# Patient Record
Sex: MALE | Race: WHITE | NOT HISPANIC OR LATINO | Employment: FULL TIME | URBAN - METROPOLITAN AREA
[De-identification: names, ages, dates, MRNs, and addresses within clinical notes are randomized per-mention and may not be internally consistent; named-entity substitution may affect disease eponyms.]

---

## 2017-03-09 ENCOUNTER — HOSPITAL ENCOUNTER (OUTPATIENT)
Dept: FAMILY MEDICINE CLINIC | Facility: CLINIC | Age: 32
Setting detail: SPECIMEN
Discharge: HOME | End: 2017-03-09
Attending: FAMILY MEDICINE | Admitting: FAMILY MEDICINE

## 2017-03-09 LAB
ALBUMIN SERPL-MCNC: 3.7 G/DL (ref 3.5–4.8)
ALBUMIN/GLOB SERPL: 1.1 {RATIO} (ref 1–1.7)
ALP SERPL-CCNC: 129 IU/L (ref 32–91)
ALT SERPL-CCNC: 29 IU/L (ref 17–63)
ANION GAP SERPL CALC-SCNC: 16.8 MMOL/L (ref 10–20)
AST SERPL-CCNC: 77 IU/L (ref 15–41)
BACTERIA SPEC AEROBE CULT: NORMAL
BASOPHILS # BLD AUTO: 0 10*3/UL (ref 0–0.2)
BASOPHILS NFR BLD AUTO: 1 % (ref 0–2)
BILIRUB SERPL-MCNC: 1.7 MG/DL (ref 0.3–1.2)
BUN SERPL-MCNC: 2 MG/DL (ref 8–20)
BUN/CREAT SERPL: 2.9 (ref 6.2–20.3)
CALCIUM SERPL-MCNC: 9 MG/DL (ref 8.9–10.3)
CHLORIDE SERPL-SCNC: 100 MMOL/L (ref 101–111)
CONV CO2: 27 MMOL/L (ref 22–32)
CONV TOTAL PROTEIN: 7.2 G/DL (ref 6.1–7.9)
CREAT UR-MCNC: 0.7 MG/DL (ref 0.7–1.2)
DIFFERENTIAL METHOD BLD: (no result)
EOSINOPHIL # BLD AUTO: 0.3 10*3/UL (ref 0–0.3)
EOSINOPHIL # BLD AUTO: 4 % (ref 0–3)
ERYTHROCYTE [DISTWIDTH] IN BLOOD BY AUTOMATED COUNT: 15.4 % (ref 11.5–14.5)
GLOBULIN UR ELPH-MCNC: 3.5 G/DL (ref 2.5–3.8)
GLUCOSE SERPL-MCNC: 94 MG/DL (ref 65–99)
HCT VFR BLD AUTO: 49.6 % (ref 40–54)
HGB BLD-MCNC: 16.7 G/DL (ref 14–18)
LYMPHOCYTES # BLD AUTO: 1.6 10*3/UL (ref 0.8–4.8)
LYMPHOCYTES NFR BLD AUTO: 24 % (ref 18–42)
Lab: NORMAL
MCH RBC QN AUTO: 35.6 PG (ref 26–32)
MCHC RBC AUTO-ENTMCNC: 33.7 G/DL (ref 32–36)
MCV RBC AUTO: 105.6 FL (ref 80–94)
MICRO REPORT STATUS: NORMAL
MONOCYTES # BLD AUTO: 0.8 10*3/UL (ref 0.1–1.3)
MONOCYTES NFR BLD AUTO: 11 % (ref 2–11)
NEUTROPHILS # BLD AUTO: 4.3 10*3/UL (ref 2.3–8.6)
NEUTROPHILS NFR BLD AUTO: 60 % (ref 50–75)
NRBC BLD AUTO-RTO: 0 /100{WBCS}
NRBC/RBC NFR BLD MANUAL: 0 10*3/UL
PLATELET # BLD AUTO: 187 10*3/UL (ref 150–450)
PMV BLD AUTO: 10 FL (ref 7.4–10.4)
POTASSIUM SERPL-SCNC: 3.8 MMOL/L (ref 3.6–5.1)
RBC # BLD AUTO: 4.7 10*6/UL (ref 4.6–6)
SODIUM SERPL-SCNC: 140 MMOL/L (ref 136–144)
SPECIMEN SOURCE: NORMAL
WBC # BLD AUTO: 7 10*3/UL (ref 4.5–11.5)

## 2019-02-10 ENCOUNTER — INPATIENT HOSPITAL (AMBULATORY)
Dept: URBAN - METROPOLITAN AREA HOSPITAL 84 | Facility: HOSPITAL | Age: 34
End: 2019-02-10

## 2019-02-10 DIAGNOSIS — R94.5 ABNORMAL RESULTS OF LIVER FUNCTION STUDIES: ICD-10-CM

## 2019-02-10 DIAGNOSIS — K70.31 ALCOHOLIC CIRRHOSIS OF LIVER WITH ASCITES: ICD-10-CM

## 2019-02-10 DIAGNOSIS — R10.11 RIGHT UPPER QUADRANT PAIN: ICD-10-CM

## 2019-02-10 DIAGNOSIS — I85.10 SECONDARY ESOPHAGEAL VARICES WITHOUT BLEEDING: ICD-10-CM

## 2019-02-10 DIAGNOSIS — R16.1 SPLENOMEGALY, NOT ELSEWHERE CLASSIFIED: ICD-10-CM

## 2019-02-10 DIAGNOSIS — R19.7 DIARRHEA, UNSPECIFIED: ICD-10-CM

## 2019-02-10 DIAGNOSIS — K70.10 ALCOHOLIC HEPATITIS WITHOUT ASCITES: ICD-10-CM

## 2019-02-10 PROCEDURE — 99251: CPT | Performed by: NURSE PRACTITIONER

## 2019-02-11 ENCOUNTER — INPATIENT HOSPITAL (AMBULATORY)
Dept: URBAN - METROPOLITAN AREA HOSPITAL 84 | Facility: HOSPITAL | Age: 34
End: 2019-02-11

## 2019-02-11 DIAGNOSIS — E87.1 HYPO-OSMOLALITY AND HYPONATREMIA: ICD-10-CM

## 2019-02-11 DIAGNOSIS — R19.7 DIARRHEA, UNSPECIFIED: ICD-10-CM

## 2019-02-11 DIAGNOSIS — E87.6 HYPOKALEMIA: ICD-10-CM

## 2019-02-11 DIAGNOSIS — D69.6 THROMBOCYTOPENIA, UNSPECIFIED: ICD-10-CM

## 2019-02-11 DIAGNOSIS — K74.60 UNSPECIFIED CIRRHOSIS OF LIVER: ICD-10-CM

## 2019-02-11 DIAGNOSIS — N39.0 URINARY TRACT INFECTION, SITE NOT SPECIFIED: ICD-10-CM

## 2019-02-11 DIAGNOSIS — K70.11 ALCOHOLIC HEPATITIS WITH ASCITES: ICD-10-CM

## 2019-02-11 DIAGNOSIS — R93.3 ABNORMAL FINDINGS ON DIAGNOSTIC IMAGING OF OTHER PARTS OF DI: ICD-10-CM

## 2019-02-11 DIAGNOSIS — Z72.0 TOBACCO USE: ICD-10-CM

## 2019-02-11 PROCEDURE — 99232 SBSQ HOSP IP/OBS MODERATE 35: CPT | Performed by: INTERNAL MEDICINE

## 2019-02-12 ENCOUNTER — INPATIENT HOSPITAL (AMBULATORY)
Dept: URBAN - METROPOLITAN AREA HOSPITAL 84 | Facility: HOSPITAL | Age: 34
End: 2019-02-12

## 2019-02-12 DIAGNOSIS — R19.7 DIARRHEA, UNSPECIFIED: ICD-10-CM

## 2019-02-12 DIAGNOSIS — Z72.0 TOBACCO USE: ICD-10-CM

## 2019-02-12 DIAGNOSIS — K70.11 ALCOHOLIC HEPATITIS WITH ASCITES: ICD-10-CM

## 2019-02-12 DIAGNOSIS — E87.6 HYPOKALEMIA: ICD-10-CM

## 2019-02-12 DIAGNOSIS — D69.6 THROMBOCYTOPENIA, UNSPECIFIED: ICD-10-CM

## 2019-02-12 DIAGNOSIS — K74.60 UNSPECIFIED CIRRHOSIS OF LIVER: ICD-10-CM

## 2019-02-12 DIAGNOSIS — E87.1 HYPO-OSMOLALITY AND HYPONATREMIA: ICD-10-CM

## 2019-02-12 DIAGNOSIS — R93.3 ABNORMAL FINDINGS ON DIAGNOSTIC IMAGING OF OTHER PARTS OF DI: ICD-10-CM

## 2019-02-12 DIAGNOSIS — N39.0 URINARY TRACT INFECTION, SITE NOT SPECIFIED: ICD-10-CM

## 2019-02-12 DIAGNOSIS — R16.1 SPLENOMEGALY, NOT ELSEWHERE CLASSIFIED: ICD-10-CM

## 2019-02-12 PROCEDURE — 99232 SBSQ HOSP IP/OBS MODERATE 35: CPT | Performed by: INTERNAL MEDICINE

## 2019-02-13 ENCOUNTER — INPATIENT HOSPITAL (AMBULATORY)
Dept: URBAN - METROPOLITAN AREA HOSPITAL 84 | Facility: HOSPITAL | Age: 34
End: 2019-02-13

## 2019-02-13 DIAGNOSIS — R19.7 DIARRHEA, UNSPECIFIED: ICD-10-CM

## 2019-02-13 DIAGNOSIS — K74.60 UNSPECIFIED CIRRHOSIS OF LIVER: ICD-10-CM

## 2019-02-13 DIAGNOSIS — D69.6 THROMBOCYTOPENIA, UNSPECIFIED: ICD-10-CM

## 2019-02-13 DIAGNOSIS — R16.1 SPLENOMEGALY, NOT ELSEWHERE CLASSIFIED: ICD-10-CM

## 2019-02-13 DIAGNOSIS — E87.6 HYPOKALEMIA: ICD-10-CM

## 2019-02-13 DIAGNOSIS — K70.11 ALCOHOLIC HEPATITIS WITH ASCITES: ICD-10-CM

## 2019-02-13 DIAGNOSIS — E87.1 HYPO-OSMOLALITY AND HYPONATREMIA: ICD-10-CM

## 2019-02-13 DIAGNOSIS — Z72.0 TOBACCO USE: ICD-10-CM

## 2019-02-13 DIAGNOSIS — R93.3 ABNORMAL FINDINGS ON DIAGNOSTIC IMAGING OF OTHER PARTS OF DI: ICD-10-CM

## 2019-02-13 DIAGNOSIS — N39.0 URINARY TRACT INFECTION, SITE NOT SPECIFIED: ICD-10-CM

## 2019-02-13 PROCEDURE — 99232 SBSQ HOSP IP/OBS MODERATE 35: CPT | Performed by: INTERNAL MEDICINE

## 2019-02-20 ENCOUNTER — HOSPITAL ENCOUNTER (OUTPATIENT)
Dept: FAMILY MEDICINE CLINIC | Facility: CLINIC | Age: 34
Setting detail: SPECIMEN
Discharge: HOME | End: 2019-02-20
Attending: FAMILY MEDICINE | Admitting: FAMILY MEDICINE

## 2019-02-20 LAB
ALBUMIN SERPL-MCNC: 2.6 G/DL (ref 3.5–4.8)
ALBUMIN/GLOB SERPL: 0.6 {RATIO} (ref 1–1.7)
ALP SERPL-CCNC: 149 IU/L (ref 32–91)
ALT SERPL-CCNC: 100 IU/L (ref 17–63)
ANION GAP SERPL CALC-SCNC: 14.6 MMOL/L (ref 10–20)
AST SERPL-CCNC: 151 IU/L (ref 15–41)
BASOPHILS # BLD AUTO: 0 10*3/UL (ref 0–0.2)
BASOPHILS NFR BLD AUTO: 0 % (ref 0–2)
BILIRUB SERPL-MCNC: 13.1 MG/DL (ref 0.3–1.2)
BUN SERPL-MCNC: 12 MG/DL (ref 8–20)
BUN/CREAT SERPL: 17.1 (ref 6.2–20.3)
CALCIUM SERPL-MCNC: 8.8 MG/DL (ref 8.9–10.3)
CHLORIDE SERPL-SCNC: 94 MMOL/L (ref 101–111)
CONV CO2: 25 MMOL/L (ref 22–32)
CONV TOTAL PROTEIN: 7.3 G/DL (ref 6.1–7.9)
CREAT UR-MCNC: 0.7 MG/DL (ref 0.7–1.2)
DIFFERENTIAL METHOD BLD: (no result)
EOSINOPHIL # BLD AUTO: 0.1 10*3/UL (ref 0–0.3)
EOSINOPHIL # BLD AUTO: 1 % (ref 0–3)
ERYTHROCYTE [DISTWIDTH] IN BLOOD BY AUTOMATED COUNT: 21 % (ref 11.5–14.5)
GLOBULIN UR ELPH-MCNC: 4.7 G/DL (ref 2.5–3.8)
GLUCOSE SERPL-MCNC: 118 MG/DL (ref 65–99)
HCT VFR BLD AUTO: 42.4 % (ref 40–54)
HGB BLD-MCNC: 14.5 G/DL (ref 14–18)
LYMPHOCYTES # BLD AUTO: 1 10*3/UL (ref 0.8–4.8)
LYMPHOCYTES NFR BLD AUTO: 6 % (ref 18–42)
MCH RBC QN AUTO: 33.2 PG (ref 26–32)
MCHC RBC AUTO-ENTMCNC: 34.3 G/DL (ref 32–36)
MCV RBC AUTO: 97 FL (ref 80–94)
MONOCYTES # BLD AUTO: 2.8 10*3/UL (ref 0.1–1.3)
MONOCYTES NFR BLD AUTO: 17 % (ref 2–11)
NEUTROPHILS # BLD AUTO: 12 10*3/UL (ref 2.3–8.6)
NEUTROPHILS NFR BLD AUTO: 76 % (ref 50–75)
NRBC BLD AUTO-RTO: 0 /100{WBCS}
NRBC/RBC NFR BLD MANUAL: 0 10*3/UL
PLATELET # BLD AUTO: 122 10*3/UL (ref 150–450)
PMV BLD AUTO: 9.8 FL (ref 7.4–10.4)
POTASSIUM SERPL-SCNC: 3.6 MMOL/L (ref 3.6–5.1)
RBC # BLD AUTO: 4.37 10*6/UL (ref 4.6–6)
SODIUM SERPL-SCNC: 130 MMOL/L (ref 136–144)
WBC # BLD AUTO: 15.9 10*3/UL (ref 4.5–11.5)

## 2019-02-21 ENCOUNTER — OFFICE (AMBULATORY)
Dept: URBAN - METROPOLITAN AREA CLINIC 64 | Facility: CLINIC | Age: 34
End: 2019-02-21

## 2019-02-21 VITALS
HEIGHT: 69 IN | WEIGHT: 215 LBS | HEART RATE: 79 BPM | DIASTOLIC BLOOD PRESSURE: 75 MMHG | SYSTOLIC BLOOD PRESSURE: 130 MMHG

## 2019-02-21 DIAGNOSIS — K70.10 ALCOHOLIC HEPATITIS WITHOUT ASCITES: ICD-10-CM

## 2019-02-21 DIAGNOSIS — K74.69 OTHER CIRRHOSIS OF LIVER: ICD-10-CM

## 2019-02-21 DIAGNOSIS — R93.3 ABNORMAL FINDINGS ON DIAGNOSTIC IMAGING OF OTHER PARTS OF DI: ICD-10-CM

## 2019-02-21 PROCEDURE — 99213 OFFICE O/P EST LOW 20 MIN: CPT | Performed by: NURSE PRACTITIONER

## 2019-02-21 RX ORDER — PREDNISONE 10 MG/1
TABLET ORAL
Qty: 100 | Refills: 0 | Status: COMPLETED
Start: 2019-02-21 | End: 2019-04-04

## 2019-02-21 RX ORDER — RIFAXIMIN 200 MG/1
TABLET ORAL
Qty: 60 | Refills: 0 | Status: COMPLETED
End: 2019-02-21

## 2019-02-21 RX ORDER — SUCRALFATE 1 G/1
TABLET ORAL
Qty: 30 | Refills: 3 | Status: COMPLETED
End: 2019-05-13

## 2019-03-27 ENCOUNTER — HOSPITAL ENCOUNTER (OUTPATIENT)
Dept: INFUSION THERAPY | Facility: HOSPITAL | Age: 34
Discharge: HOME | End: 2019-03-27
Attending: NURSE PRACTITIONER | Admitting: NURSE PRACTITIONER

## 2019-04-04 ENCOUNTER — OFFICE (AMBULATORY)
Dept: URBAN - METROPOLITAN AREA CLINIC 64 | Facility: CLINIC | Age: 34
End: 2019-04-04

## 2019-04-04 VITALS
DIASTOLIC BLOOD PRESSURE: 65 MMHG | WEIGHT: 205 LBS | HEART RATE: 120 BPM | SYSTOLIC BLOOD PRESSURE: 101 MMHG | HEIGHT: 69 IN

## 2019-04-04 DIAGNOSIS — K74.69 OTHER CIRRHOSIS OF LIVER: ICD-10-CM

## 2019-04-04 DIAGNOSIS — R18.8 OTHER ASCITES: ICD-10-CM

## 2019-04-04 DIAGNOSIS — K70.10 ALCOHOLIC HEPATITIS WITHOUT ASCITES: ICD-10-CM

## 2019-04-04 PROCEDURE — 99214 OFFICE O/P EST MOD 30 MIN: CPT | Performed by: NURSE PRACTITIONER

## 2019-04-04 RX ORDER — SPIRONOLACTONE 50 MG/1
50 TABLET, FILM COATED ORAL
Qty: 30 | Refills: 5 | Status: ACTIVE
Start: 2019-04-04

## 2019-04-04 RX ORDER — OMEPRAZOLE 40 MG/1
40 CAPSULE, DELAYED RELEASE ORAL
Qty: 30 | Refills: 11 | Status: ACTIVE
Start: 2019-04-04

## 2019-04-04 RX ORDER — FUROSEMIDE 20 MG/1
TABLET ORAL
Qty: 30 | Refills: 3 | Status: ACTIVE

## 2019-04-09 ENCOUNTER — ON CAMPUS - OUTPATIENT (AMBULATORY)
Dept: URBAN - METROPOLITAN AREA HOSPITAL 2 | Facility: HOSPITAL | Age: 34
End: 2019-04-09

## 2019-04-09 VITALS
HEART RATE: 112 BPM | WEIGHT: 203 LBS | OXYGEN SATURATION: 99 % | HEIGHT: 69 IN | DIASTOLIC BLOOD PRESSURE: 78 MMHG | RESPIRATION RATE: 16 BRPM | DIASTOLIC BLOOD PRESSURE: 82 MMHG | HEART RATE: 109 BPM | OXYGEN SATURATION: 97 % | SYSTOLIC BLOOD PRESSURE: 124 MMHG | DIASTOLIC BLOOD PRESSURE: 76 MMHG | TEMPERATURE: 98.8 F | HEART RATE: 111 BPM | DIASTOLIC BLOOD PRESSURE: 86 MMHG | SYSTOLIC BLOOD PRESSURE: 134 MMHG | HEART RATE: 123 BPM | HEART RATE: 118 BPM | DIASTOLIC BLOOD PRESSURE: 110 MMHG | SYSTOLIC BLOOD PRESSURE: 110 MMHG | OXYGEN SATURATION: 98 % | SYSTOLIC BLOOD PRESSURE: 128 MMHG | SYSTOLIC BLOOD PRESSURE: 130 MMHG

## 2019-04-09 DIAGNOSIS — K70.30 ALCOHOLIC CIRRHOSIS OF LIVER WITHOUT ASCITES: ICD-10-CM

## 2019-04-09 DIAGNOSIS — I85.00 ESOPHAGEAL VARICES WITHOUT BLEEDING: ICD-10-CM

## 2019-04-09 DIAGNOSIS — T18.128A FOOD IN ESOPHAGUS CAUSING OTHER INJURY, INITIAL ENCOUNTER: ICD-10-CM

## 2019-04-09 PROCEDURE — 43235 EGD DIAGNOSTIC BRUSH WASH: CPT | Performed by: INTERNAL MEDICINE

## 2019-04-09 RX ORDER — NADOLOL 40 MG/1
40 TABLET ORAL
Qty: 30 | Refills: 12 | Status: ACTIVE
Start: 2019-04-09

## 2019-04-09 NOTE — SERVICENOTES
He does have grade I-II 2 cards of esophageal varices, his INR is 2.1, bands were not applied to avoid post-banding ulcer bleeding

## 2019-04-09 NOTE — SERVICEHPINOTES
CHLOE ESTRADA  is a  34  male   who presents today for a  EGD   for   the indications listed below. The updated Patient Profile was reviewed prior to the procedure, in conjunction with the Physical Exam, including medical conditions, surgical procedures, medications, allergies, family history and social history. See Physical Exam time stamp below for date and time of HPI completion.Pre-operatively, I reviewed the indication(s) for the procedure, the risks of the procedure [including but not limited to: unexpected bleeding possibly requiring hospitalization and/or unplanned repeat procedures, perforation possibly requiring surgical treatment, missed lesions and complications of sedation/MAC (also explained by anesthesia staff)]. I have evaluated the patient for risks associated with the planned anesthesia and the procedure to be performed and find the patient an acceptable candidate for IV sedation.Multiple opportunities were provided for any questions or concerns, and all questions were answered satisfactorily before any anesthesia was administered. We will proceed with the planned procedure.BR

## 2019-04-17 ENCOUNTER — HOSPITAL ENCOUNTER (OUTPATIENT)
Dept: INFUSION THERAPY | Facility: HOSPITAL | Age: 34
Discharge: ANOTHER HOSPITAL | End: 2019-04-17
Attending: NURSE PRACTITIONER | Admitting: NURSE PRACTITIONER

## 2019-04-17 LAB
ALBUMIN FLD-MCNC: <1 G/DL
ALBUMIN SERPL-MCNC: 2 G/DL (ref 3.5–4.8)
CONCENTRATED SMEAR: (no result)
CONV GRANULOCYTES, FLUID: 1 %
CONV MONOCYTES, FLUID: 39 %
CONV SERUM MINUS FLUID: <2
CONV TOTAL PROTEIN: 6 G/DL (ref 6.1–7.9)
LIPASE SERPL-CCNC: 56 U/L (ref 22–51)
LYMPHOCYTES NFR FLD MANUAL: 58 %
MESOTHL CELL NFR FLD MANUAL: 2 %
PROT FLD-MCNC: 1 G/DL
SPECIMEN SOURCE: (no result)
TP FLUID/SERUM RATIO: 0.2
WBC # FLD MANUAL: (no result) {CELLS}/UL

## 2019-04-19 LAB — CYTOLOGY SPECIMEN: NORMAL

## 2019-05-13 ENCOUNTER — OFFICE (AMBULATORY)
Dept: URBAN - METROPOLITAN AREA CLINIC 64 | Facility: CLINIC | Age: 34
End: 2019-05-13

## 2019-05-13 VITALS
SYSTOLIC BLOOD PRESSURE: 108 MMHG | WEIGHT: 194 LBS | DIASTOLIC BLOOD PRESSURE: 65 MMHG | HEART RATE: 81 BPM | HEIGHT: 69 IN

## 2019-05-13 DIAGNOSIS — I85.00 ESOPHAGEAL VARICES WITHOUT BLEEDING: ICD-10-CM

## 2019-05-13 DIAGNOSIS — K70.11 ALCOHOLIC HEPATITIS WITH ASCITES: ICD-10-CM

## 2019-05-13 PROCEDURE — 99214 OFFICE O/P EST MOD 30 MIN: CPT | Performed by: INTERNAL MEDICINE

## 2019-05-13 RX ORDER — MIDODRINE HYDROCHLORIDE 10 MG/1
TABLET ORAL
Qty: 90 | Refills: 3 | Status: ACTIVE

## 2019-05-31 ENCOUNTER — HOSPITAL ENCOUNTER (OUTPATIENT)
Dept: INFUSION THERAPY | Facility: HOSPITAL | Age: 34
Discharge: HOME | End: 2019-05-31
Attending: NURSE PRACTITIONER | Admitting: NURSE PRACTITIONER

## 2019-05-31 LAB
BASOPHILS # BLD AUTO: 0.1 10*3/UL (ref 0–0.2)
BASOPHILS NFR BLD AUTO: 1 % (ref 0–2)
DIFFERENTIAL METHOD BLD: (no result)
EOSINOPHIL # BLD AUTO: 0.2 10*3/UL (ref 0–0.3)
EOSINOPHIL # BLD AUTO: 4 % (ref 0–3)
ERYTHROCYTE [DISTWIDTH] IN BLOOD BY AUTOMATED COUNT: 18 % (ref 11.5–14.5)
HCT VFR BLD AUTO: 32.6 % (ref 40–54)
HGB BLD-MCNC: 11.1 G/DL (ref 14–18)
INR PPP: 1.6
LYMPHOCYTES # BLD AUTO: 1.7 10*3/UL (ref 0.8–4.8)
LYMPHOCYTES NFR BLD AUTO: 32 % (ref 18–42)
MCH RBC QN AUTO: 30.1 PG (ref 26–32)
MCHC RBC AUTO-ENTMCNC: 34.1 G/DL (ref 32–36)
MCV RBC AUTO: 88.3 FL (ref 80–94)
MONOCYTES # BLD AUTO: 0.8 10*3/UL (ref 0.1–1.3)
MONOCYTES NFR BLD AUTO: 16 % (ref 2–11)
NEUTROPHILS # BLD AUTO: 2.4 10*3/UL (ref 2.3–8.6)
NEUTROPHILS NFR BLD AUTO: 47 % (ref 50–75)
NRBC BLD AUTO-RTO: 0 /100{WBCS}
NRBC/RBC NFR BLD MANUAL: 0 10*3/UL
PLATELET # BLD AUTO: 152 10*3/UL (ref 150–450)
PMV BLD AUTO: 8 FL (ref 7.4–10.4)
PROTHROMBIN TIME: 15.7 SEC (ref 9.6–11.7)
RBC # BLD AUTO: 3.69 10*6/UL (ref 4.6–6)
WBC # BLD AUTO: 5.2 10*3/UL (ref 4.5–11.5)

## 2019-10-10 DIAGNOSIS — K70.11 ALCOHOLIC HEPATITIS WITH ASCITES: Primary | ICD-10-CM

## 2019-10-10 RX ORDER — ALBUMIN (HUMAN) 12.5 G/50ML
12.5 SOLUTION INTRAVENOUS DAILY PRN
Status: CANCELLED | OUTPATIENT
Start: 2019-10-10

## 2019-10-10 RX ORDER — ALBUMIN (HUMAN) 12.5 G/50ML
25 SOLUTION INTRAVENOUS DAILY PRN
Status: CANCELLED | OUTPATIENT
Start: 2019-10-10

## 2019-10-11 ENCOUNTER — HOSPITAL ENCOUNTER (OUTPATIENT)
Dept: INFUSION THERAPY | Facility: HOSPITAL | Age: 34
Discharge: HOME | End: 2019-10-11
Admitting: NURSE PRACTITIONER

## 2019-10-11 VITALS
HEIGHT: 69 IN | TEMPERATURE: 96.6 F | HEART RATE: 65 BPM | SYSTOLIC BLOOD PRESSURE: 111 MMHG | WEIGHT: 190 LBS | RESPIRATION RATE: 13 BRPM | OXYGEN SATURATION: 100 % | BODY MASS INDEX: 28.14 KG/M2 | DIASTOLIC BLOOD PRESSURE: 55 MMHG

## 2019-10-11 DIAGNOSIS — K70.11 ALCOHOLIC HEPATITIS WITH ASCITES: ICD-10-CM

## 2019-10-11 LAB
DEPRECATED RDW RBC AUTO: 38.9 FL (ref 37–54)
ERYTHROCYTE [DISTWIDTH] IN BLOOD BY AUTOMATED COUNT: 16 % (ref 12.3–15.4)
HCT VFR BLD AUTO: 26.8 % (ref 37.5–51)
HGB BLD-MCNC: 8.7 G/DL (ref 13–17.7)
INR PPP: 1.27 (ref 0.9–1.1)
MCH RBC QN AUTO: 22.3 PG (ref 26.6–33)
MCHC RBC AUTO-ENTMCNC: 32.4 G/DL (ref 31.5–35.7)
MCV RBC AUTO: 68.7 FL (ref 79–97)
PLATELET # BLD AUTO: 177 10*3/MM3 (ref 140–450)
PMV BLD AUTO: 8.3 FL (ref 6–12)
PROTHROMBIN TIME: 12.8 SECONDS (ref 9.6–11.7)
RBC # BLD AUTO: 3.9 10*6/MM3 (ref 4.14–5.8)
WBC NRBC COR # BLD: 5.8 10*3/MM3 (ref 3.4–10.8)

## 2019-10-11 PROCEDURE — G0463 HOSPITAL OUTPT CLINIC VISIT: HCPCS

## 2019-10-11 PROCEDURE — 85610 PROTHROMBIN TIME: CPT | Performed by: NURSE PRACTITIONER

## 2019-10-11 PROCEDURE — 85027 COMPLETE CBC AUTOMATED: CPT | Performed by: NURSE PRACTITIONER

## 2019-10-11 PROCEDURE — 36415 COLL VENOUS BLD VENIPUNCTURE: CPT

## 2019-10-11 RX ORDER — CETIRIZINE HYDROCHLORIDE, PSEUDOEPHEDRINE HYDROCHLORIDE 5; 120 MG/1; MG/1
TABLET, FILM COATED, EXTENDED RELEASE ORAL
COMMUNITY
Start: 2017-02-22 | End: 2021-12-01

## 2019-10-11 RX ORDER — FUROSEMIDE 40 MG/1
40 TABLET ORAL DAILY
COMMUNITY
End: 2021-12-01

## 2019-10-11 NOTE — PROGRESS NOTES
PARACENTESIS    Time Arrived in Department: 10/11/19 0810    Consent: Yes  Allergies have been Reviewed: Yes    ID Band Verified: Yes    Method: Ambulatory    Lab Results: Checked, Abnormal and MD Notified     Physician: Dr. Reynoso    Nurse: Chanda Du RN    IR Care Plan: Person Educated, Current Knowledge, Learning Barrier, Readiness to Learn , Teaching Method, Teaching Topic and Evaluation of Teaching    Neurological: Within Normal Limits    Skin: Flesh, Warm and Dry    Respiratory Status: Respirations even and enlabored    Room In: 0810    Procedure: Paracentesis    Time Out Completed:     Time Out:     Prep Time:     Prep Site 1:     Prep:     Procedure Position: Right Side    Guidance: Ultrasound-no fluid to remove per Dr. Reynoso    Fluid Quality:     Procedure Note: Procedure canceled-no fluid to remove        Intra Time 1:  Intra Assess 1:   LOC:   Pain:   Skin:  Respiratory Status:   Intra Procedure Note 1:         Intra Time 2:  Intra Assess 2:   LOC:   Pain:   Skin:  Respiratory Status:   Intra Procedure Note 2:         Intra Time 3:  Intra Assess 3:   LOC: Pain:   Skin:  Respiratory Status:   Intra Procedure Note 3:        Intra Time 4:  Intra Assess 4:   LOC:   Pain:   Skin:  Respiratory Status:   Intra Procedure Note 4:      Intra Time 5:  Intra Assess 5:   LOC:   Pain:   Skin:  Respiratory Status:   Intra Procedure Note 5:      Intra Time 6:  Intra Assess 6:   LOC: Pain:   Skin:  Respiratory Status:   Intra Procedure Note 6:        Post-Procedure Position:     Dressing Site 1:     Post Procedure Status:      Specimen To Lab:     Total Fluid Removed:     Post Procedure Note:        Report Given To:     Admit/Transfer To:     Transfer Time:     Discharge Time: 1030    Method: Ambulatory    O2 on Transfer:     Accompanied By:  self    Clothes Changed:      Discharged Location:  home    Discharge Teaching:  Follow up with MD and PAtient

## 2022-05-08 ENCOUNTER — APPOINTMENT (OUTPATIENT)
Dept: CT IMAGING | Facility: HOSPITAL | Age: 37
End: 2022-05-08

## 2022-05-08 ENCOUNTER — HOSPITAL ENCOUNTER (EMERGENCY)
Facility: HOSPITAL | Age: 37
Discharge: HOME | End: 2022-05-08
Attending: EMERGENCY MEDICINE | Admitting: EMERGENCY MEDICINE

## 2022-05-08 VITALS
TEMPERATURE: 97.6 F | OXYGEN SATURATION: 97 % | BODY MASS INDEX: 28.66 KG/M2 | WEIGHT: 200.18 LBS | RESPIRATION RATE: 16 BRPM | HEIGHT: 70 IN | HEART RATE: 107 BPM | SYSTOLIC BLOOD PRESSURE: 143 MMHG | DIASTOLIC BLOOD PRESSURE: 86 MMHG

## 2022-05-08 DIAGNOSIS — S01.81XA FACIAL LACERATION, INITIAL ENCOUNTER: ICD-10-CM

## 2022-05-08 DIAGNOSIS — F10.921 ALCOHOL INTOXICATION WITH DELIRIUM: Primary | ICD-10-CM

## 2022-05-08 LAB
ALBUMIN SERPL-MCNC: 4 G/DL (ref 3.5–5.2)
ALBUMIN/GLOB SERPL: 1.1 G/DL
ALP SERPL-CCNC: 104 U/L (ref 39–117)
ALT SERPL W P-5'-P-CCNC: 66 U/L (ref 1–41)
ANION GAP SERPL CALCULATED.3IONS-SCNC: 14.7 MMOL/L (ref 5–15)
AST SERPL-CCNC: 161 U/L (ref 1–40)
BILIRUB SERPL-MCNC: 3.3 MG/DL (ref 0–1.2)
BUN SERPL-MCNC: 2 MG/DL (ref 6–20)
BUN/CREAT SERPL: 2.4 (ref 7–25)
CALCIUM SPEC-SCNC: 8.7 MG/DL (ref 8.6–10.5)
CHLORIDE SERPL-SCNC: 105 MMOL/L (ref 98–107)
CO2 SERPL-SCNC: 24.3 MMOL/L (ref 22–29)
CREAT SERPL-MCNC: 0.82 MG/DL (ref 0.76–1.27)
EGFRCR SERPLBLD CKD-EPI 2021: 115.3 ML/MIN/1.73
ETHANOL BLD-MCNC: 397 MG/DL (ref 0–10)
ETHANOL UR QL: 0.4 %
GLOBULIN UR ELPH-MCNC: 3.7 GM/DL
GLUCOSE SERPL-MCNC: 120 MG/DL (ref 65–99)
HOLD SPECIMEN: NORMAL
HOLD SPECIMEN: NORMAL
POTASSIUM SERPL-SCNC: 3.1 MMOL/L (ref 3.5–5.2)
PROT SERPL-MCNC: 7.7 G/DL (ref 6–8.5)
SODIUM SERPL-SCNC: 144 MMOL/L (ref 136–145)
WHOLE BLOOD HOLD SPECIMEN: NORMAL
WHOLE BLOOD HOLD SPECIMEN: NORMAL

## 2022-05-08 PROCEDURE — 80053 COMPREHEN METABOLIC PANEL: CPT | Performed by: EMERGENCY MEDICINE

## 2022-05-08 PROCEDURE — 70450 CT HEAD/BRAIN W/O DYE: CPT

## 2022-05-08 PROCEDURE — 90715 TDAP VACCINE 7 YRS/> IM: CPT | Performed by: EMERGENCY MEDICINE

## 2022-05-08 PROCEDURE — 25010000002 TETANUS-DIPHTH-ACELL PERTUSSIS 5-2.5-18.5 LF-MCG/0.5 SUSPENSION PREFILLED SYRINGE: Performed by: EMERGENCY MEDICINE

## 2022-05-08 PROCEDURE — 82077 ASSAY SPEC XCP UR&BREATH IA: CPT | Performed by: EMERGENCY MEDICINE

## 2022-05-08 PROCEDURE — 90471 IMMUNIZATION ADMIN: CPT | Performed by: EMERGENCY MEDICINE

## 2022-05-08 PROCEDURE — 72125 CT NECK SPINE W/O DYE: CPT

## 2022-05-08 PROCEDURE — 99284 EMERGENCY DEPT VISIT MOD MDM: CPT

## 2022-05-08 RX ORDER — SODIUM CHLORIDE 0.9 % (FLUSH) 0.9 %
10 SYRINGE (ML) INJECTION AS NEEDED
Status: DISCONTINUED | OUTPATIENT
Start: 2022-05-08 | End: 2022-05-08 | Stop reason: HOSPADM

## 2022-05-08 RX ORDER — LIDOCAINE HYDROCHLORIDE AND EPINEPHRINE 10; 10 MG/ML; UG/ML
10 INJECTION, SOLUTION INFILTRATION; PERINEURAL ONCE
Status: COMPLETED | OUTPATIENT
Start: 2022-05-08 | End: 2022-05-08

## 2022-05-08 RX ADMIN — TETANUS TOXOID, REDUCED DIPHTHERIA TOXOID AND ACELLULAR PERTUSSIS VACCINE, ADSORBED 0.5 ML: 5; 2.5; 8; 8; 2.5 SUSPENSION INTRAMUSCULAR at 19:19

## 2022-05-08 RX ADMIN — LIDOCAINE HYDROCHLORIDE,EPINEPHRINE BITARTRATE 10 ML: 10; .01 INJECTION, SOLUTION INFILTRATION; PERINEURAL at 19:16

## 2022-05-08 NOTE — ED PROVIDER NOTES
Time: 7:01 PM EDT  Arrived by: private car  Chief Complaint: Alcohol intoxication, multiple falls, facial laceration  History provided by: Patient and patient's mother  History is limited by: Alcohol intoxication      History of Present Illness:  Patient is a 37 y.o. year old male who presents to the emergency department with alcohol intoxication.  Patient states his girlfriend gave him upsetting news in the past 48 hours so he has relapsed.  He had been sober from alcohol for an extended period of time but last 48 hours has been drinking heavily.  Does not recall details of his falls.  Patient denies suicidal ideation to me and states this is all out of sadness and simple relapse.  Patient has no complaints of recent illness.  Denies any pain from his injuries.    HPI    Similar Symptoms Previously: Yes  Recently seen: No      Patient Care Team  Primary Care Provider: Madonna Sandy MD    Past Medical History:     No Known Allergies  Past Medical History:   Diagnosis Date   • Gout    • Liver disease      Past Surgical History:   Procedure Laterality Date   • MYRINGOTOMY      bilateral   • TONSILLECTOMY       Family History   Problem Relation Age of Onset   • Diabetes Mother    • Hypertension Mother    • Hypertension Father    • Diabetes Maternal Grandmother    • Diabetes Maternal Grandfather        Home Medications:  Prior to Admission medications    Medication Sig Start Date End Date Taking? Authorizing Provider   azelastine (ASTELIN) 0.1 % nasal spray 2 sprays into the nostril(s) as directed by provider 2 (Two) Times a Day. Use in each nostril as directed 12/1/21   Jenni Grier APRN   brompheniramine-pseudoephedrine-DM (Bromfed DM) 30-2-10 MG/5ML syrup Take 5 mL by mouth 4 (Four) Times a Day As Needed for Cough or Allergies. 12/1/21   Jenni Grier APRN        Social History:   Social History     Tobacco Use   • Smoking status: Current Every Day Smoker     Packs/day: 0.50      "Years: 14.00     Pack years: 7.00     Types: Cigarettes     Start date: 2005   • Smokeless tobacco: Never Used   Vaping Use   • Vaping Use: Never used   Substance Use Topics   • Alcohol use: No     Comment: Stopped Feburary 8, 2019   • Drug use: No     Recent travel: no     Review of Systems:  Review of Systems   Constitutional: Negative for chills and fever.   HENT: Negative for congestion, rhinorrhea and sore throat.    Eyes: Negative for pain and visual disturbance.   Respiratory: Negative for apnea, cough, chest tightness and shortness of breath.    Cardiovascular: Negative for chest pain and palpitations.   Gastrointestinal: Negative for abdominal pain, diarrhea, nausea and vomiting.   Genitourinary: Negative for difficulty urinating and dysuria.   Musculoskeletal: Negative for joint swelling and myalgias.   Skin: Negative for color change.   Neurological: Negative for seizures and headaches.   Psychiatric/Behavioral: Negative.  Negative for suicidal ideas.   All other systems reviewed and are negative.       Physical Exam:  /86   Pulse 107   Temp 97.6 °F (36.4 °C) (Oral)   Resp 16   Ht 177.8 cm (70\")   Wt 90.8 kg (200 lb 2.8 oz)   SpO2 97%   BMI 28.72 kg/m²     Physical Exam  Vitals and nursing note reviewed.   Constitutional:       Appearance: Normal appearance.      Comments: Smells of heavy alcohol.  Is not obtunded is fully responsive to verbal questioning.   HENT:      Head: Normocephalic and atraumatic.      Nose: Nose normal.      Mouth/Throat:      Mouth: Mucous membranes are dry.   Eyes:      Extraocular Movements: Extraocular movements intact.      Pupils: Pupils are equal, round, and reactive to light.   Cardiovascular:      Rate and Rhythm: Regular rhythm. Tachycardia present.      Heart sounds: Normal heart sounds.   Pulmonary:      Effort: Pulmonary effort is normal.      Breath sounds: Normal breath sounds.   Abdominal:      General: Bowel sounds are normal.      Palpations: Abdomen " is soft.      Tenderness: There is no abdominal tenderness.   Musculoskeletal:         General: No swelling. Normal range of motion.      Cervical back: Normal range of motion and neck supple.   Skin:     General: Skin is warm and dry.      Coloration: Skin is not jaundiced.      Comments: Approximately 2 cm laceration over left lateral eyebrow/inferior forehead.  Hemostatic.   Neurological:      General: No focal deficit present.      Mental Status: He is alert and oriented to person, place, and time. Mental status is at baseline.   Psychiatric:         Mood and Affect: Mood normal.         Behavior: Behavior normal.         Judgment: Judgment normal.                Medications in the Emergency Department:  Medications   sodium chloride 0.9 % flush 10 mL (has no administration in time range)   Tetanus-Diphth-Acell Pertussis (BOOSTRIX) injection 0.5 mL (0.5 mL Intramuscular Given 5/8/22 1919)   lidocaine 1% - EPINEPHrine 1:057552 (XYLOCAINE W/EPI) 1 %-1:254814 injection 10 mL (10 mL Injection Given 5/8/22 1916)        Labs  Lab Results (last 24 hours)     Procedure Component Value Units Date/Time    Ethanol [[id number]]  (Abnormal) Collected: 05/08/22 1658    Specimen: Blood Updated: 05/08/22 1734     Ethanol 397 mg/dL      Ethanol % 0.397 %     Narrative:      Ethanol (Plasma)  <10 Essentially Negative    Toxic Concentrations           mg/dL    Flushing, slowing of reflexes    Impaired visual activity         Depression of CNS              >100  Possible Coma                  >300       Comprehensive Metabolic Panel [[id number]]  (Abnormal) Collected: 05/08/22 1658    Specimen: Blood Updated: 05/08/22 1734     Glucose 120 mg/dL      BUN 2 mg/dL      Creatinine 0.82 mg/dL      Sodium 144 mmol/L      Potassium 3.1 mmol/L      Chloride 105 mmol/L      CO2 24.3 mmol/L      Calcium 8.7 mg/dL      Total Protein 7.7 g/dL      Albumin 4.00 g/dL      ALT (SGPT) 66 U/L      AST (SGOT) 161 U/L      Alkaline  Phosphatase 104 U/L      Total Bilirubin 3.3 mg/dL      Globulin 3.7 gm/dL      A/G Ratio 1.1 g/dL      BUN/Creatinine Ratio 2.4     Anion Gap 14.7 mmol/L      eGFR 115.3 mL/min/1.73      Comment: National Kidney Foundation and American Society of Nephrology (ASN) Task Force recommended calculation based on the Chronic Kidney Disease Epidemiology Collaboration (CKD-EPI) equation refit without adjustment for race.       Narrative:      GFR Normal >60  Chronic Kidney Disease <60  Kidney Failure <15             Imaging:  CT Head Without Contrast    Result Date: 5/8/2022  PROCEDURE: CT HEAD WO CONTRAST  COMPARISON:  None INDICATIONS: trauma, multiple falls, headache, posterior neck pain  PROTOCOL:   Standard imaging protocol performed    RADIATION:   DLP: 1366.9mGy*cm   MA and/or KV was adjusted to minimize radiation dose.     TECHNIQUE: Axial images of the head without intravenous contrast.  FINDINGS:  The ventricles have a normal size and configuration. There is no evidence of acute intracranial hemorrhage, mass or midline shift. No extra-axial fluid collections are identified. There are no skull fractures.  There is mild polypoid mucosal thickening in the visualized maxillary sinuses.  The visualized paranasal sinuses and mastoid air cells are otherwise grossly clear.  IMPRESSION: Normal unenhanced CT scan of the brain.  JACKELIN DALE MD       Electronically Signed and Approved By: JACKELIN DALE MD on 5/08/2022 at 17:28             CT Cervical Spine Without Contrast    Result Date: 5/8/2022  PROCEDURE: CT CERVICAL SPINE WO CONTRAST  COMPARISON: [facility], CT, CT HEAD WO CONTRAST, 5/08/2022, 17:07.  INDICATIONS: Trauma, multiple falls, headache, posterior neck pain  PROTOCOL:   Standard imaging protocol performed    RADIATION:   DLP: 1366.9mGy*cm   MA and/or KV was adjusted to minimize radiation dose.     TECHNIQUE: Axial images of the cervical spine without intravenous contrast.  Reformatted  images were performed.  FINDINGS: No fractures are identified.  There is a metallic piercing posteriorly in the soft tissues.  No evidence of subluxation.  There are no lytic or sclerotic lesions.  No evidence of paraspinal soft tissue mass or adenopathy.  IMPRESSION:  No osseous abnormalities are identified in the cervical spine.  JACKELIN DALE MD       Electronically Signed and Approved By: JACKELIN DALE MD on 5/08/2022 at 17:31               Procedures:  Laceration Repair    Date/Time: 5/8/2022 7:02 PM  Performed by: Shelley Smith APRN  Authorized by: Kush Schwarz MD     Consent:     Consent obtained:  Verbal    Consent given by:  Patient    Risks, benefits, and alternatives were discussed: yes      Risks discussed:  Infection, need for additional repair, pain, poor cosmetic result, poor wound healing and retained foreign body    Alternatives discussed:  No treatment, delayed treatment, observation and referral  Universal protocol:     Patient identity confirmed:  Verbally with patient  Anesthesia:     Anesthesia method:  Local infiltration    Local anesthetic:  Lidocaine 1% WITH epi  Laceration details:     Location:  Face    Face location:  Forehead    Length (cm):  3    Depth (mm):  3  Exploration:     Hemostasis achieved with:  Epinephrine    Wound exploration: entire depth of wound visualized      Contaminated: no    Treatment:     Area cleansed with:  Povidone-iodine and saline    Amount of cleaning:  Standard    Irrigation solution:  Sterile saline    Irrigation method:  Syringe  Skin repair:     Repair method:  Sutures    Suture size:  5-0    Suture material:  Nylon    Suture technique:  Simple interrupted    Number of sutures:  5  Approximation:     Approximation:  Close  Repair type:     Repair type:  Simple  Post-procedure details:     Procedure completion:  Tolerated well, no immediate complications  Comments:      2 cm laceration with adjacent communicating 1.5 cm  laceration        Progress  ED Course as of 05/08/22 2116   Sun May 08, 2022   2114 After patient's mother arrived to bedside there was mention of patient's wanting treatment for his relapse on alcohol.  Mother did state that earlier in the day there was some text message alluding to suicidal thoughts.  Patient voices with mother at bedside in front of me that he is not feeling suicidal and does not even recall these text as he was very intoxicated.  Although I did offer for patient to be admitted, patient and mother both prefer for the patient to go home with his mother for the next 24 hours and return after he is more sober for discussion of treatment for alcoholism.  Again, patient voices no suicidal intent or feelings and states these were statements made out of extreme drunkenness and sadness about an issue with his girlfriend. [RP]      ED Course User Index  [RP] Kush Schwarz MD                            Medical Decision Making:  MDM  Number of Diagnoses or Management Options  Alcohol intoxication with delirium (HCC): new and requires workup  Facial laceration, initial encounter: new and does not require workup     Amount and/or Complexity of Data Reviewed  Clinical lab tests: reviewed  Tests in the radiology section of CPT®: reviewed  Decide to obtain previous medical records or to obtain history from someone other than the patient: yes  Independent visualization of images, tracings, or specimens: yes    Risk of Complications, Morbidity, and/or Mortality  Presenting problems: low  Management options: minimal         Final diagnoses:   Alcohol intoxication with delirium (HCC)   Facial laceration, initial encounter        Disposition:  ED Disposition     ED Disposition   Discharge    Condition   Stable    Comment   --             This medical record created using voice recognition software.           Kush Schwarz MD  05/08/22 2116

## 2022-05-09 ENCOUNTER — TELEPHONE (OUTPATIENT)
Dept: FAMILY MEDICINE CLINIC | Facility: CLINIC | Age: 37
End: 2022-05-09

## 2022-05-09 NOTE — DISCHARGE INSTRUCTIONS
Sutures should be removed in approximately 5 to 7 days.  Return to the emergency department immediately for any suicidal thoughts.

## 2022-05-09 NOTE — TELEPHONE ENCOUNTER
Caller: Angeline Pan    Relationship: Mother    Best call back number: [phone]    Who is your current provider: DR WHYTE    Who would you like your new provider to be: LOKI ECHAVARRIA    What are your reasons for transferring care: FAMILY SEES [facility]. MOTHER IS ANGELINE PAN [phone]    Additional notes: PATIENT WAS SEEN AT [facility] 5/8/22 AND TRUST [facility] TO FOLLOW CARE

## 2022-05-12 ENCOUNTER — OFFICE VISIT (OUTPATIENT)
Dept: FAMILY MEDICINE CLINIC | Facility: CLINIC | Age: 37
End: 2022-05-12

## 2022-05-12 ENCOUNTER — LAB (OUTPATIENT)
Dept: FAMILY MEDICINE CLINIC | Facility: CLINIC | Age: 37
End: 2022-05-12

## 2022-05-12 VITALS
HEART RATE: 80 BPM | OXYGEN SATURATION: 99 % | DIASTOLIC BLOOD PRESSURE: 76 MMHG | WEIGHT: 194.6 LBS | BODY MASS INDEX: 27.86 KG/M2 | SYSTOLIC BLOOD PRESSURE: 123 MMHG | HEIGHT: 70 IN | RESPIRATION RATE: 10 BRPM

## 2022-05-12 DIAGNOSIS — K70.30 ALCOHOLIC CIRRHOSIS, UNSPECIFIED WHETHER ASCITES PRESENT: Primary | ICD-10-CM

## 2022-05-12 DIAGNOSIS — Z13.220 LIPID SCREENING: ICD-10-CM

## 2022-05-12 DIAGNOSIS — K70.30 ALCOHOLIC CIRRHOSIS, UNSPECIFIED WHETHER ASCITES PRESENT: ICD-10-CM

## 2022-05-12 DIAGNOSIS — F32.1 CURRENT MODERATE EPISODE OF MAJOR DEPRESSIVE DISORDER WITHOUT PRIOR EPISODE: ICD-10-CM

## 2022-05-12 DIAGNOSIS — F17.210 TOBACCO DEPENDENCE DUE TO CIGARETTES: ICD-10-CM

## 2022-05-12 LAB
INR PPP: 1.73 (ref 0.93–1.1)
PROTHROMBIN TIME: 17.3 SECONDS (ref 9.6–11.7)

## 2022-05-12 PROCEDURE — 86706 HEP B SURFACE ANTIBODY: CPT | Performed by: PHYSICIAN ASSISTANT

## 2022-05-12 PROCEDURE — 80053 COMPREHEN METABOLIC PANEL: CPT | Performed by: PHYSICIAN ASSISTANT

## 2022-05-12 PROCEDURE — 83540 ASSAY OF IRON: CPT | Performed by: PHYSICIAN ASSISTANT

## 2022-05-12 PROCEDURE — 82746 ASSAY OF FOLIC ACID SERUM: CPT | Performed by: PHYSICIAN ASSISTANT

## 2022-05-12 PROCEDURE — 87340 HEPATITIS B SURFACE AG IA: CPT | Performed by: PHYSICIAN ASSISTANT

## 2022-05-12 PROCEDURE — 82607 VITAMIN B-12: CPT | Performed by: PHYSICIAN ASSISTANT

## 2022-05-12 PROCEDURE — 82728 ASSAY OF FERRITIN: CPT | Performed by: PHYSICIAN ASSISTANT

## 2022-05-12 PROCEDURE — 36415 COLL VENOUS BLD VENIPUNCTURE: CPT

## 2022-05-12 PROCEDURE — 99204 OFFICE O/P NEW MOD 45 MIN: CPT | Performed by: PHYSICIAN ASSISTANT

## 2022-05-12 PROCEDURE — 80061 LIPID PANEL: CPT | Performed by: PHYSICIAN ASSISTANT

## 2022-05-12 PROCEDURE — 85025 COMPLETE CBC W/AUTO DIFF WBC: CPT | Performed by: PHYSICIAN ASSISTANT

## 2022-05-12 PROCEDURE — 86704 HEP B CORE ANTIBODY TOTAL: CPT | Performed by: PHYSICIAN ASSISTANT

## 2022-05-12 PROCEDURE — 85610 PROTHROMBIN TIME: CPT | Performed by: PHYSICIAN ASSISTANT

## 2022-05-12 PROCEDURE — 86803 HEPATITIS C AB TEST: CPT | Performed by: PHYSICIAN ASSISTANT

## 2022-05-12 PROCEDURE — 85007 BL SMEAR W/DIFF WBC COUNT: CPT | Performed by: PHYSICIAN ASSISTANT

## 2022-05-12 PROCEDURE — 84466 ASSAY OF TRANSFERRIN: CPT | Performed by: PHYSICIAN ASSISTANT

## 2022-05-12 RX ORDER — BUPROPION HYDROCHLORIDE 150 MG/1
150 TABLET ORAL DAILY
Qty: 90 TABLET | Refills: 3 | Status: SHIPPED | OUTPATIENT
Start: 2022-05-12 | End: 2023-01-27 | Stop reason: SDUPTHER

## 2022-05-12 NOTE — PROGRESS NOTES
"Subjective   Colin Rowland is a 37 y.o. male.     Chief Complaint   Patient presents with   • Establish Care       /76   Pulse 80   Resp 10   Ht 177.8 cm (70\")   Wt 88.3 kg (194 lb 9.6 oz)   SpO2 99%   BMI 27.92 kg/m²     BP Readings from Last 3 Encounters:   05/12/22 123/76   05/08/22 143/86   12/01/21 147/77       Wt Readings from Last 3 Encounters:   05/12/22 88.3 kg (194 lb 9.6 oz)   05/08/22 90.8 kg (200 lb 2.8 oz)   12/01/21 91.2 kg (201 lb)       HPI Presents to the clinic to establish care as a new patient for alcoholic cirrhosis and depression. He has a longstanding history of alcohol abuse. He has known cirrhosis and was following with [facility] hepatology but has not since 2019. He denies any current abdominal distension. He did recently relapse and have a pretty bad night where he was intoxicated at almost .400. he fell and has bruising and facial trauma. He had ct scans of the head and the c-spine. He has a history of varices. He was previously on lasix, nadolol, spironolactone and is currently not on any medications. He is having depression. No admitted si or hi. Is not currently on medications but would like to try new medication. He has been on ssri and did not do well at all with that. He is interested in wellbutrin as his mother is on this and does good on it. No history of seizure.     The following portions of the patient's history were reviewed and updated as appropriate: allergies, current medications, past family history, past medical history, past social history, past surgical history and problem list.    Review of Systems    Objective   Physical Exam  Constitutional:       Appearance: He is well-developed.   HENT:      Head: Normocephalic and atraumatic.   Eyes:      Conjunctiva/sclera: Conjunctivae normal.      Pupils: Pupils are equal, round, and reactive to light.   Cardiovascular:      Rate and Rhythm: Normal rate and regular rhythm.      Heart sounds: No murmur " heard.  Pulmonary:      Effort: Pulmonary effort is normal.      Breath sounds: Normal breath sounds.   Abdominal:      General: Bowel sounds are normal. There is no distension.      Palpations: Abdomen is soft.      Tenderness: There is no abdominal tenderness.      Comments: No obvious ascites or fluid shift.    Musculoskeletal:         General: No deformity. Normal range of motion.      Cervical back: Normal range of motion and neck supple.   Lymphadenopathy:      Cervical: No cervical adenopathy.   Skin:     General: Skin is warm and dry.      Capillary Refill: Capillary refill takes less than 2 seconds.      Findings: No rash.      Comments: Bruising throughout. Stitches in place on the right forehead with incomplete proximation at this time. Livedo reticularis. Does appear to have gynecomastia.    Neurological:      Mental Status: He is alert and oriented to person, place, and time.      Cranial Nerves: No cranial nerve deficit.   Psychiatric:         Behavior: Behavior normal.         Thought Content: Thought content normal.         Judgment: Judgment normal.           Diagnoses and all orders for this visit:    1. Alcoholic cirrhosis, unspecified whether ascites present (HCC) (Primary)  -     CBC w AUTO Differential; Future  -     Comprehensive metabolic panel; Future  -     Protime-INR; Future  -     Iron and TIBC; Future  -     Vitamin B12; Future  -     Folate; Future  -     US Abdomen Complete; Future  -     VIRAL HEPATITIS HBV, HCV; Future  -     Ferritin; Future    2. Current moderate episode of major depressive disorder without prior episode (HCC)  -     Ambulatory Referral to Psychology    3. Tobacco dependence due to cigarettes    4. Lipid screening  -     Lipid panel; Future    Other orders  -     buPROPion XL (Wellbutrin XL) 150 MG 24 hr tablet; Take 1 tablet by mouth Daily for 360 days.  Dispense: 90 tablet; Refill: 3    We will start out by getting baseline blood work as well as checking a  repeat ultrasound of the liver. He has been overwhelmed in the past by the amount of doctors he has had to see, so we will try to avoid referrals at this time. We discussed the important of alcohol abstinence and he is motivated to quit. He does smoke but we will work on this down the line. Follow up with psychology and start wellbutrin. We discussed side effects to this and with his alcohol history we discussed seizures but he has never had withdrawal seizure in the past.     No follow-ups on file.

## 2022-05-13 DIAGNOSIS — K70.30 ALCOHOLIC CIRRHOSIS, UNSPECIFIED WHETHER ASCITES PRESENT: Primary | ICD-10-CM

## 2022-05-13 LAB
ALBUMIN SERPL-MCNC: 3.9 G/DL (ref 3.5–5.2)
ALBUMIN/GLOB SERPL: 1.1 G/DL
ALP SERPL-CCNC: 107 U/L (ref 39–117)
ALT SERPL W P-5'-P-CCNC: 49 U/L (ref 1–41)
ANION GAP SERPL CALCULATED.3IONS-SCNC: 13 MMOL/L (ref 5–15)
AST SERPL-CCNC: 86 U/L (ref 1–40)
BASOPHILS # BLD MANUAL: 0.03 10*3/MM3 (ref 0–0.2)
BASOPHILS NFR BLD MANUAL: 1 % (ref 0–1.5)
BILIRUB SERPL-MCNC: 5 MG/DL (ref 0–1.2)
BUN SERPL-MCNC: 5 MG/DL (ref 6–20)
BUN/CREAT SERPL: 7.9 (ref 7–25)
CALCIUM SPEC-SCNC: 8.9 MG/DL (ref 8.6–10.5)
CHLORIDE SERPL-SCNC: 103 MMOL/L (ref 98–107)
CHOLEST SERPL-MCNC: 109 MG/DL (ref 0–200)
CO2 SERPL-SCNC: 23 MMOL/L (ref 22–29)
CREAT SERPL-MCNC: 0.63 MG/DL (ref 0.76–1.27)
DEPRECATED RDW RBC AUTO: 45.5 FL (ref 37–54)
EGFRCR SERPLBLD CKD-EPI 2021: 124.9 ML/MIN/1.73
ERYTHROCYTE [DISTWIDTH] IN BLOOD BY AUTOMATED COUNT: 13.6 % (ref 12.3–15.4)
FERRITIN SERPL-MCNC: 492 NG/ML (ref 30–400)
FOLATE SERPL-MCNC: 8.51 NG/ML (ref 4.78–24.2)
GLOBULIN UR ELPH-MCNC: 3.7 GM/DL
GLUCOSE SERPL-MCNC: 96 MG/DL (ref 65–99)
HBV CORE AB SERPL QL IA: NEGATIVE
HBV SURFACE AB SER QL: REACTIVE
HBV SURFACE AG SERPL QL IA: NEGATIVE
HCT VFR BLD AUTO: 46.3 % (ref 37.5–51)
HCV AB S/CO SERPL IA: <0.1 S/CO RATIO (ref 0–0.9)
HCV AB SERPL QL IA: NORMAL
HDLC SERPL-MCNC: 27 MG/DL (ref 40–60)
HGB BLD-MCNC: 17 G/DL (ref 13–17.7)
IRON 24H UR-MRATE: 120 MCG/DL (ref 59–158)
IRON SATN MFR SERPL: 37 % (ref 20–50)
LDLC SERPL CALC-MCNC: 65 MG/DL (ref 0–100)
LDLC/HDLC SERPL: 2.42 {RATIO}
LYMPHOCYTES # BLD MANUAL: 0.18 10*3/MM3 (ref 0.7–3.1)
LYMPHOCYTES NFR BLD MANUAL: 6 % (ref 5–12)
MCH RBC QN AUTO: 33.9 PG (ref 26.6–33)
MCHC RBC AUTO-ENTMCNC: 36.7 G/DL (ref 31.5–35.7)
MCV RBC AUTO: 92.2 FL (ref 79–97)
MONOCYTES # BLD: 0.15 10*3/MM3 (ref 0.1–0.9)
NEUTROPHILS # BLD AUTO: 2.16 10*3/MM3 (ref 1.7–7)
NEUTROPHILS NFR BLD MANUAL: 86 % (ref 42.7–76)
PLAT MORPH BLD: NORMAL
PLATELET # BLD AUTO: 88 10*3/MM3 (ref 140–450)
PMV BLD AUTO: 11.4 FL (ref 6–12)
POIKILOCYTOSIS BLD QL SMEAR: ABNORMAL
POLYCHROMASIA BLD QL SMEAR: ABNORMAL
POTASSIUM SERPL-SCNC: 3.6 MMOL/L (ref 3.5–5.2)
PROT SERPL-MCNC: 7.6 G/DL (ref 6–8.5)
RBC # BLD AUTO: 5.02 10*6/MM3 (ref 4.14–5.8)
SODIUM SERPL-SCNC: 139 MMOL/L (ref 136–145)
TIBC SERPL-MCNC: 328 MCG/DL (ref 298–536)
TRANSFERRIN SERPL-MCNC: 220 MG/DL (ref 200–360)
TRIGL SERPL-MCNC: 83 MG/DL (ref 0–150)
VARIANT LYMPHS NFR BLD MANUAL: 7 % (ref 19.6–45.3)
VIT B12 BLD-MCNC: 981 PG/ML (ref 211–946)
VLDLC SERPL-MCNC: 17 MG/DL (ref 5–40)
WBC MORPH BLD: NORMAL
WBC NRBC COR # BLD: 2.51 10*3/MM3 (ref 3.4–10.8)

## 2022-05-20 ENCOUNTER — HOSPITAL ENCOUNTER (OUTPATIENT)
Dept: ULTRASOUND IMAGING | Facility: HOSPITAL | Age: 37
Discharge: HOME | End: 2022-05-20
Admitting: PHYSICIAN ASSISTANT

## 2022-05-20 DIAGNOSIS — K70.30 ALCOHOLIC CIRRHOSIS, UNSPECIFIED WHETHER ASCITES PRESENT: ICD-10-CM

## 2022-05-20 PROCEDURE — 76705 ECHO EXAM OF ABDOMEN: CPT

## 2022-05-23 RX ORDER — SPIRONOLACTONE 25 MG/1
25 TABLET ORAL DAILY
Qty: 90 TABLET | Refills: 0 | Status: SHIPPED | OUTPATIENT
Start: 2022-05-23 | End: 2022-08-22

## 2022-06-01 ENCOUNTER — TELEPHONE (OUTPATIENT)
Dept: FAMILY MEDICINE CLINIC | Facility: CLINIC | Age: 37
End: 2022-06-01

## 2022-06-01 NOTE — TELEPHONE ENCOUNTER
Caller: Angeline Sosa    Relationship: Mother    Best call back number: [phone]       What test was performed: ULTRASOUND    When was the test performed: FEW WEEKS AGO     Where was the test performed: [facility]     Additional notes:     RESULTS REQUESTED

## 2022-08-22 RX ORDER — SPIRONOLACTONE 25 MG/1
TABLET ORAL
Qty: 90 TABLET | Refills: 0 | Status: SHIPPED | OUTPATIENT
Start: 2022-08-22 | End: 2022-12-05

## 2022-12-05 RX ORDER — SPIRONOLACTONE 25 MG/1
TABLET ORAL
Qty: 90 TABLET | Refills: 1 | Status: SHIPPED | OUTPATIENT
Start: 2022-12-05 | End: 2023-01-27 | Stop reason: SDUPTHER

## 2023-01-01 ENCOUNTER — TELEPHONE (OUTPATIENT)
Dept: FAMILY MEDICINE CLINIC | Facility: CLINIC | Age: 38
End: 2023-01-01

## 2023-01-27 ENCOUNTER — OFFICE VISIT (OUTPATIENT)
Dept: FAMILY MEDICINE CLINIC | Facility: CLINIC | Age: 38
End: 2023-01-27
Payer: COMMERCIAL

## 2023-01-27 VITALS
HEART RATE: 88 BPM | DIASTOLIC BLOOD PRESSURE: 76 MMHG | SYSTOLIC BLOOD PRESSURE: 149 MMHG | HEIGHT: 69 IN | WEIGHT: 201 LBS | BODY MASS INDEX: 29.77 KG/M2 | RESPIRATION RATE: 20 BRPM | OXYGEN SATURATION: 99 % | TEMPERATURE: 99.8 F

## 2023-01-27 DIAGNOSIS — K70.30 ALCOHOLIC CIRRHOSIS, UNSPECIFIED WHETHER ASCITES PRESENT: Primary | ICD-10-CM

## 2023-01-27 DIAGNOSIS — F32.1 CURRENT MODERATE EPISODE OF MAJOR DEPRESSIVE DISORDER WITHOUT PRIOR EPISODE: ICD-10-CM

## 2023-01-27 PROCEDURE — 99214 OFFICE O/P EST MOD 30 MIN: CPT | Performed by: PHYSICIAN ASSISTANT

## 2023-01-27 RX ORDER — LANOLIN ALCOHOL/MO/W.PET/CERES
1000 CREAM (GRAM) TOPICAL DAILY
Qty: 90 TABLET | Refills: 3 | Status: SHIPPED | OUTPATIENT
Start: 2023-01-27

## 2023-01-27 RX ORDER — BUPROPION HYDROCHLORIDE 150 MG/1
150 TABLET ORAL DAILY
Qty: 90 TABLET | Refills: 3 | Status: SHIPPED | OUTPATIENT
Start: 2023-01-27 | End: 2024-01-22

## 2023-01-27 RX ORDER — SPIRONOLACTONE 25 MG/1
25 TABLET ORAL DAILY
Qty: 90 TABLET | Refills: 1 | Status: SHIPPED | OUTPATIENT
Start: 2023-01-27

## 2023-01-27 RX ORDER — LANOLIN ALCOHOL/MO/W.PET/CERES
100 CREAM (GRAM) TOPICAL DAILY
Qty: 90 TABLET | Refills: 3 | Status: SHIPPED | OUTPATIENT
Start: 2023-01-27 | End: 2023-03-30 | Stop reason: SDUPTHER

## 2023-01-27 RX ORDER — FOLIC ACID 1 MG/1
1 TABLET ORAL DAILY
Qty: 90 TABLET | Refills: 3 | Status: SHIPPED | OUTPATIENT
Start: 2023-01-27

## 2023-01-27 NOTE — PROGRESS NOTES
"Subjective   Colin Rowland is a 37 y.o. male.     Chief Complaint   Patient presents with   • Follow-up     Follow up on cirrhosis       /76   Pulse 88   Temp 99.8 °F (37.7 °C) (Infrared)   Resp 20   Ht 175.6 cm (69.13\")   Wt 91.2 kg (201 lb)   SpO2 99%   BMI 29.57 kg/m²     BP Readings from Last 3 Encounters:   01/27/23 149/76   05/16/22 131/63   05/12/22 123/76       Wt Readings from Last 3 Encounters:   01/27/23 91.2 kg (201 lb)   05/16/22 90.3 kg (199 lb)   05/12/22 88.3 kg (194 lb 9.6 oz)       HPI Presents to the clinic for follow up on alcoholic cirrhosis. He was doing really well but he has recently fallen off a bit. He has been drinking in jan due to stress. He denies soa or chest pain. He has felt a little bit of fluid in the stomach. Has some foot pain from standing at work. Off wellbutrin but it helped and is going to get back on.     The following portions of the patient's history were reviewed and updated as appropriate: allergies, current medications, past family history, past medical history, past social history, past surgical history and problem list.    Review of Systems    Objective   Physical Exam  Constitutional:       Appearance: Normal appearance.   Eyes:      Extraocular Movements: Extraocular movements intact.      Pupils: Pupils are equal, round, and reactive to light.   Cardiovascular:      Rate and Rhythm: Normal rate.      Heart sounds: No murmur heard.  Pulmonary:      Effort: Pulmonary effort is normal.      Breath sounds: No wheezing.   Neurological:      General: No focal deficit present.      Mental Status: He is alert and oriented to person, place, and time.   Psychiatric:         Mood and Affect: Mood normal.         Behavior: Behavior normal.           Diagnoses and all orders for this visit:    1. Alcoholic cirrhosis, unspecified whether ascites present (HCC) (Primary)  -     CBC w AUTO Differential; Future  -     Comprehensive metabolic panel; Future  -     " Vitamin B12; Future  -     Folate; Future  -     Protime-INR; Future    2. Current moderate episode of major depressive disorder without prior episode (HCC)    Other orders  -     spironolactone (ALDACTONE) 25 MG tablet; Take 1 tablet by mouth Daily.  Dispense: 90 tablet; Refill: 1  -     buPROPion XL (Wellbutrin XL) 150 MG 24 hr tablet; Take 1 tablet by mouth Daily for 360 days.  Dispense: 90 tablet; Refill: 3  -     vitamin B-12 (CYANOCOBALAMIN) 1000 MCG tablet; Take 1 tablet by mouth Daily.  Dispense: 90 tablet; Refill: 3  -     folic acid (FOLVITE) 1 MG tablet; Take 1 tablet by mouth Daily.  Dispense: 90 tablet; Refill: 3  -     thiamine (VITAMIN B1) 100 MG tablet; Take 1 tablet by mouth Daily.  Dispense: 90 tablet; Refill: 3      Take folic acid, vit b12, and thiamine- get back on aldactone. Check blood work in [facility]- we will do this about 1 month. Work hard on alcohol cessation.     Return in about 4 months (around 5/27/2023), or if symptoms worsen or fail to improve, for Recheck.

## 2023-02-24 ENCOUNTER — LAB (OUTPATIENT)
Dept: LAB | Facility: HOSPITAL | Age: 38
End: 2023-02-24
Payer: COMMERCIAL

## 2023-02-24 DIAGNOSIS — K70.30 ALCOHOLIC CIRRHOSIS, UNSPECIFIED WHETHER ASCITES PRESENT: ICD-10-CM

## 2023-02-24 LAB
ALBUMIN SERPL-MCNC: 3.2 G/DL (ref 3.5–5.2)
ALBUMIN/GLOB SERPL: 0.9 G/DL
ALP SERPL-CCNC: 139 U/L (ref 39–117)
ALT SERPL W P-5'-P-CCNC: 26 U/L (ref 1–41)
ANION GAP SERPL CALCULATED.3IONS-SCNC: 9 MMOL/L (ref 5–15)
AST SERPL-CCNC: 77 U/L (ref 1–40)
BASOPHILS # BLD AUTO: 0.01 10*3/MM3 (ref 0–0.2)
BASOPHILS NFR BLD AUTO: 0.4 % (ref 0–1.5)
BILIRUB SERPL-MCNC: 4.9 MG/DL (ref 0–1.2)
BUN SERPL-MCNC: 4 MG/DL (ref 6–20)
BUN/CREAT SERPL: 6.3 (ref 7–25)
CALCIUM SPEC-SCNC: 8 MG/DL (ref 8.6–10.5)
CHLORIDE SERPL-SCNC: 100 MMOL/L (ref 98–107)
CO2 SERPL-SCNC: 26 MMOL/L (ref 22–29)
CREAT SERPL-MCNC: 0.64 MG/DL (ref 0.76–1.27)
DEPRECATED RDW RBC AUTO: 44.8 FL (ref 37–54)
EGFRCR SERPLBLD CKD-EPI 2021: 123.5 ML/MIN/1.73
EOSINOPHIL # BLD AUTO: 0.03 10*3/MM3 (ref 0–0.4)
EOSINOPHIL NFR BLD AUTO: 1.2 % (ref 0.3–6.2)
ERYTHROCYTE [DISTWIDTH] IN BLOOD BY AUTOMATED COUNT: 13.8 % (ref 12.3–15.4)
FOLATE SERPL-MCNC: 5.38 NG/ML (ref 4.78–24.2)
GLOBULIN UR ELPH-MCNC: 3.7 GM/DL
GLUCOSE SERPL-MCNC: 95 MG/DL (ref 65–99)
HCT VFR BLD AUTO: 37.7 % (ref 37.5–51)
HGB BLD-MCNC: 13.1 G/DL (ref 13–17.7)
IMM GRANULOCYTES # BLD AUTO: 0.01 10*3/MM3 (ref 0–0.05)
IMM GRANULOCYTES NFR BLD AUTO: 0.4 % (ref 0–0.5)
INR PPP: 2.06 (ref 0.86–1.15)
LYMPHOCYTES # BLD AUTO: 0.36 10*3/MM3 (ref 0.7–3.1)
LYMPHOCYTES NFR BLD AUTO: 14 % (ref 19.6–45.3)
MCH RBC QN AUTO: 31 PG (ref 26.6–33)
MCHC RBC AUTO-ENTMCNC: 34.7 G/DL (ref 31.5–35.7)
MCV RBC AUTO: 89.3 FL (ref 79–97)
MONOCYTES # BLD AUTO: 0.35 10*3/MM3 (ref 0.1–0.9)
MONOCYTES NFR BLD AUTO: 13.6 % (ref 5–12)
NEUTROPHILS NFR BLD AUTO: 1.82 10*3/MM3 (ref 1.7–7)
NEUTROPHILS NFR BLD AUTO: 70.4 % (ref 42.7–76)
NRBC BLD AUTO-RTO: 0 /100 WBC (ref 0–0.2)
PLATELET # BLD AUTO: 56 10*3/MM3 (ref 140–450)
PMV BLD AUTO: 11.5 FL (ref 6–12)
POTASSIUM SERPL-SCNC: 3.4 MMOL/L (ref 3.5–5.2)
PROT SERPL-MCNC: 6.9 G/DL (ref 6–8.5)
PROTHROMBIN TIME: 23.6 SECONDS (ref 11.8–14.9)
RBC # BLD AUTO: 4.22 10*6/MM3 (ref 4.14–5.8)
SODIUM SERPL-SCNC: 135 MMOL/L (ref 136–145)
VIT B12 BLD-MCNC: 772 PG/ML (ref 211–946)
WBC NRBC COR # BLD: 2.58 10*3/MM3 (ref 3.4–10.8)

## 2023-02-24 PROCEDURE — 82607 VITAMIN B-12: CPT

## 2023-02-24 PROCEDURE — 36415 COLL VENOUS BLD VENIPUNCTURE: CPT

## 2023-02-24 PROCEDURE — 82746 ASSAY OF FOLIC ACID SERUM: CPT

## 2023-02-24 PROCEDURE — 85025 COMPLETE CBC W/AUTO DIFF WBC: CPT

## 2023-02-24 PROCEDURE — 85610 PROTHROMBIN TIME: CPT

## 2023-02-24 PROCEDURE — 80053 COMPREHEN METABOLIC PANEL: CPT

## 2023-03-30 PROCEDURE — 87081 CULTURE SCREEN ONLY: CPT | Performed by: STUDENT IN AN ORGANIZED HEALTH CARE EDUCATION/TRAINING PROGRAM

## 2023-04-02 ENCOUNTER — TELEPHONE (OUTPATIENT)
Dept: URGENT CARE | Facility: CLINIC | Age: 38
End: 2023-04-02
Payer: COMMERCIAL

## 2023-04-03 ENCOUNTER — TELEPHONE (OUTPATIENT)
Dept: URGENT CARE | Facility: CLINIC | Age: 38
End: 2023-04-03
Payer: COMMERCIAL

## 2023-04-03 NOTE — TELEPHONE ENCOUNTER
----- Message from TANIA Burleson sent at 4/2/2023  1:50 PM EDT -----  Call patient regarding negative throat culture result.  Continue treatment discussed at visit.  Follow-up with primary care if symptoms persist

## 2023-04-25 ENCOUNTER — TELEPHONE (OUTPATIENT)
Dept: FAMILY MEDICINE CLINIC | Facility: CLINIC | Age: 38
End: 2023-04-25
Payer: COMMERCIAL

## 2023-04-25 DIAGNOSIS — K70.30 ALCOHOLIC CIRRHOSIS, UNSPECIFIED WHETHER ASCITES PRESENT: Primary | ICD-10-CM

## 2023-04-25 NOTE — TELEPHONE ENCOUNTER
PATIENT'S MOM CALLED TO SAY AT HIS LAST APPOINTMENT [facility] TOLD PATIENT HE NEEDED TO SEE A DR ABOUT HIS LIVER AND PATIENT ASKED TO BE REFERRED TO ONE IN [facility] WHERE HE LIVES NOW AND THEY HAVE NOT HEARD ANYTHING BACK ABOUT THAT.

## 2023-07-18 ENCOUNTER — HOSPITAL ENCOUNTER (INPATIENT)
Facility: HOSPITAL | Age: 38
LOS: 4 days | Discharge: HOME | DRG: 433 | End: 2023-07-22
Attending: EMERGENCY MEDICINE | Admitting: FAMILY MEDICINE
Payer: COMMERCIAL

## 2023-07-18 ENCOUNTER — APPOINTMENT (OUTPATIENT)
Dept: GENERAL RADIOLOGY | Facility: HOSPITAL | Age: 38
DRG: 433 | End: 2023-07-18
Payer: COMMERCIAL

## 2023-07-18 DIAGNOSIS — E87.1 HYPONATREMIA: ICD-10-CM

## 2023-07-18 DIAGNOSIS — E80.6 HYPERBILIRUBINEMIA: ICD-10-CM

## 2023-07-18 DIAGNOSIS — K74.60 CIRRHOSIS OF LIVER WITH ASCITES, UNSPECIFIED HEPATIC CIRRHOSIS TYPE: Primary | ICD-10-CM

## 2023-07-18 DIAGNOSIS — I31.39 PERICARDIAL EFFUSION: ICD-10-CM

## 2023-07-18 DIAGNOSIS — K70.11 ASCITES DUE TO ALCOHOLIC HEPATITIS: ICD-10-CM

## 2023-07-18 DIAGNOSIS — D69.6 THROMBOCYTOPENIA: ICD-10-CM

## 2023-07-18 DIAGNOSIS — R18.8 CIRRHOSIS OF LIVER WITH ASCITES, UNSPECIFIED HEPATIC CIRRHOSIS TYPE: Primary | ICD-10-CM

## 2023-07-18 PROBLEM — K72.90 LIVER FAILURE: Status: ACTIVE | Noted: 2023-07-18

## 2023-07-18 LAB
ALBUMIN SERPL-MCNC: 2.4 G/DL (ref 3.5–5.2)
ALBUMIN/GLOB SERPL: 0.6 G/DL
ALP SERPL-CCNC: 128 U/L (ref 39–117)
ALT SERPL W P-5'-P-CCNC: 39 U/L (ref 1–41)
AMMONIA BLD-SCNC: 50 UMOL/L (ref 16–60)
ANION GAP SERPL CALCULATED.3IONS-SCNC: 8.1 MMOL/L (ref 5–15)
APTT PPP: 46 SECONDS (ref 78–95.9)
AST SERPL-CCNC: 97 U/L (ref 1–40)
BASE EXCESS BLDV CALC-SCNC: 2.5 MMOL/L (ref -2–2)
BASOPHILS # BLD AUTO: 0.03 10*3/MM3 (ref 0–0.2)
BASOPHILS NFR BLD AUTO: 0.5 % (ref 0–1.5)
BDY SITE: ABNORMAL
BILIRUB SERPL-MCNC: 21.7 MG/DL (ref 0–1.2)
BUN SERPL-MCNC: 11 MG/DL (ref 6–20)
BUN/CREAT SERPL: 14.1 (ref 7–25)
CA-I BLDA-SCNC: 0.99 MMOL/L (ref 1.13–1.32)
CALCIUM SPEC-SCNC: 8.2 MG/DL (ref 8.6–10.5)
CHLORIDE BLDV-SCNC: 86 MMOL/L (ref 98–106)
CHLORIDE SERPL-SCNC: 83 MMOL/L (ref 98–107)
CO2 SERPL-SCNC: 27.9 MMOL/L (ref 22–29)
COHGB MFR BLD: 4.9 % (ref 0–1.5)
CREAT SERPL-MCNC: 0.78 MG/DL (ref 0.76–1.27)
DEPRECATED RDW RBC AUTO: 53.2 FL (ref 37–54)
EGFRCR SERPLBLD CKD-EPI 2021: 116.3 ML/MIN/1.73
EOSINOPHIL # BLD AUTO: 0.07 10*3/MM3 (ref 0–0.4)
EOSINOPHIL NFR BLD AUTO: 1.1 % (ref 0.3–6.2)
ERYTHROCYTE [DISTWIDTH] IN BLOOD BY AUTOMATED COUNT: 15.5 % (ref 12.3–15.4)
FHHB: 21.8 % (ref 0–5)
GAS FLOW AIRWAY: ABNORMAL L/MIN
GLOBULIN UR ELPH-MCNC: 4.2 GM/DL
GLUCOSE BLDA-MCNC: 98 MG/DL (ref 70–99)
GLUCOSE SERPL-MCNC: 130 MG/DL (ref 65–99)
HCO3 BLDV-SCNC: 24.8 MMOL/L (ref 22–26)
HCT VFR BLD AUTO: 33.4 % (ref 37.5–51)
HGB BLD-MCNC: 12.1 G/DL (ref 13–17.7)
HGB BLDA-MCNC: 13.4 G/DL (ref 13.8–16.4)
HOLD SPECIMEN: NORMAL
HOLD SPECIMEN: NORMAL
IMM GRANULOCYTES # BLD AUTO: 0.04 10*3/MM3 (ref 0–0.05)
IMM GRANULOCYTES NFR BLD AUTO: 0.6 % (ref 0–0.5)
INHALED O2 CONCENTRATION: ABNORMAL %
INR PPP: 3.67 (ref 0.86–1.15)
LACTATE BLDA-SCNC: 1.34 MMOL/L (ref 0.5–2)
LIPASE SERPL-CCNC: 86 U/L (ref 13–60)
LYMPHOCYTES # BLD AUTO: 0.36 10*3/MM3 (ref 0.7–3.1)
LYMPHOCYTES NFR BLD AUTO: 5.5 % (ref 19.6–45.3)
MAGNESIUM SERPL-MCNC: 1.9 MG/DL (ref 1.6–2.6)
MCH RBC QN AUTO: 34.3 PG (ref 26.6–33)
MCHC RBC AUTO-ENTMCNC: 36.2 G/DL (ref 31.5–35.7)
MCV RBC AUTO: 94.6 FL (ref 79–97)
METHGB BLD QL: 0.1 % (ref 0–1.5)
MODALITY: ABNORMAL
MONOCYTES # BLD AUTO: 1.14 10*3/MM3 (ref 0.1–0.9)
MONOCYTES NFR BLD AUTO: 17.4 % (ref 5–12)
NEUTROPHILS NFR BLD AUTO: 4.93 10*3/MM3 (ref 1.7–7)
NEUTROPHILS NFR BLD AUTO: 74.9 % (ref 42.7–76)
NOTE: ABNORMAL
NRBC BLD AUTO-RTO: 0 /100 WBC (ref 0–0.2)
OXYHGB MFR BLDV: 73.2 % (ref 94–99)
PCO2 BLDV: 31.7 MM HG (ref 41–51)
PH BLDV: 7.51 PH UNITS (ref 7.31–7.41)
PLATELET # BLD AUTO: 98 10*3/MM3 (ref 140–450)
PMV BLD AUTO: 9.7 FL (ref 6–12)
PO2 BLDV: 39.7 MM HG (ref 35–42)
POTASSIUM BLDV-SCNC: 3.4 MMOL/L (ref 3.5–5)
POTASSIUM SERPL-SCNC: 3.3 MMOL/L (ref 3.5–5.2)
PROT SERPL-MCNC: 6.6 G/DL (ref 6–8.5)
PROTHROMBIN TIME: 35.6 SECONDS (ref 11.8–14.9)
RBC # BLD AUTO: 3.53 10*6/MM3 (ref 4.14–5.8)
SAO2 % BLDCOV: 77.1 % (ref 45–75)
SODIUM BLDV-SCNC: 118.2 MMOL/L (ref 136–146)
SODIUM SERPL-SCNC: 119 MMOL/L (ref 136–145)
WBC NRBC COR # BLD: 6.57 10*3/MM3 (ref 3.4–10.8)
WHOLE BLOOD HOLD COAG: NORMAL
WHOLE BLOOD HOLD SPECIMEN: NORMAL

## 2023-07-18 PROCEDURE — 82820 HEMOGLOBIN-OXYGEN AFFINITY: CPT | Performed by: EMERGENCY MEDICINE

## 2023-07-18 PROCEDURE — 99284 EMERGENCY DEPT VISIT MOD MDM: CPT

## 2023-07-18 PROCEDURE — 83690 ASSAY OF LIPASE: CPT

## 2023-07-18 PROCEDURE — 86901 BLOOD TYPING SEROLOGIC RH(D): CPT | Performed by: STUDENT IN AN ORGANIZED HEALTH CARE EDUCATION/TRAINING PROGRAM

## 2023-07-18 PROCEDURE — 82140 ASSAY OF AMMONIA: CPT | Performed by: EMERGENCY MEDICINE

## 2023-07-18 PROCEDURE — 85730 THROMBOPLASTIN TIME PARTIAL: CPT

## 2023-07-18 PROCEDURE — 36415 COLL VENOUS BLD VENIPUNCTURE: CPT

## 2023-07-18 PROCEDURE — 25010000002 FUROSEMIDE PER 20 MG: Performed by: EMERGENCY MEDICINE

## 2023-07-18 PROCEDURE — 85025 COMPLETE CBC W/AUTO DIFF WBC: CPT

## 2023-07-18 PROCEDURE — 71045 X-RAY EXAM CHEST 1 VIEW: CPT

## 2023-07-18 PROCEDURE — 83735 ASSAY OF MAGNESIUM: CPT | Performed by: EMERGENCY MEDICINE

## 2023-07-18 PROCEDURE — 99223 1ST HOSP IP/OBS HIGH 75: CPT | Performed by: FAMILY MEDICINE

## 2023-07-18 PROCEDURE — 86900 BLOOD TYPING SEROLOGIC ABO: CPT | Performed by: STUDENT IN AN ORGANIZED HEALTH CARE EDUCATION/TRAINING PROGRAM

## 2023-07-18 PROCEDURE — 80053 COMPREHEN METABOLIC PANEL: CPT

## 2023-07-18 PROCEDURE — 82805 BLOOD GASES W/O2 SATURATION: CPT | Performed by: EMERGENCY MEDICINE

## 2023-07-18 PROCEDURE — 85610 PROTHROMBIN TIME: CPT

## 2023-07-18 RX ORDER — FUROSEMIDE 10 MG/ML
80 INJECTION INTRAMUSCULAR; INTRAVENOUS ONCE
Status: COMPLETED | OUTPATIENT
Start: 2023-07-18 | End: 2023-07-18

## 2023-07-18 RX ORDER — SODIUM CHLORIDE 0.9 % (FLUSH) 0.9 %
10 SYRINGE (ML) INJECTION AS NEEDED
Status: DISCONTINUED | OUTPATIENT
Start: 2023-07-18 | End: 2023-07-19

## 2023-07-18 RX ADMIN — SODIUM CHLORIDE 1000 ML: 9 INJECTION, SOLUTION INTRAVENOUS at 19:34

## 2023-07-18 RX ADMIN — FUROSEMIDE 80 MG: 10 INJECTION, SOLUTION INTRAMUSCULAR; INTRAVENOUS at 22:18

## 2023-07-18 NOTE — ED PROVIDER NOTES
Time: 5:33 PM EDT  Date of encounter:  7/18/2023  Independent Historian/Clinical History and Information was obtained by:   Patient    History is limited by: N/A    Chief Complaint: Abdominal distention      History of Present Illness:  Patient is a 38 y.o. year old male who presents to the emergency department for evaluation of abdominal distention for about the past week.  He has history of liver disease and was advised by his doctor to come get his abdomen drained.  The patient has a known history of cirrhosis from alcoholism.  The patient states he had a relapse and started drinking about 4 months ago.  The patient stopped drinking a week ago.  However, the patient's abdominal distention has worsened.  The patient has some shortness of breath.  The patient has some swelling in his legs.  Patient states that shortness of breath is worsened with exertion relieved by rest.  The patient states although his belly is distended he has no abdominal pain.  Patient had no fever, rigors or myalgias.  The patient has no headache.  Patient has no chest pain.  Patient denies any black or bloody stools    HPI    Patient Care Team  Primary Care Provider: Andrea Sanders PA-C    Past Medical History:     No Known Allergies  Past Medical History:   Diagnosis Date    Gout     Liver disease      Past Surgical History:   Procedure Laterality Date    MYRINGOTOMY      bilateral    TONSILLECTOMY       Family History   Problem Relation Age of Onset    Diabetes Mother     Hypertension Mother     Hypertension Father     Diabetes Maternal Grandmother     Diabetes Maternal Grandfather        Home Medications:  Prior to Admission medications    Medication Sig Start Date End Date Taking? Authorizing Provider   buPROPion XL (Wellbutrin XL) 150 MG 24 hr tablet Take 1 tablet by mouth Daily for 360 days. 1/27/23 1/22/24  Andrea Sanders PA-C   fluticasone (FLONASE) 50 MCG/ACT nasal spray 2 sprays into the nostril(s) as directed by provider  "Daily for 31 days. 3/30/23 4/30/23  Cooksey, John Calvin, PA-C   folic acid (FOLVITE) 1 MG tablet Take 1 tablet by mouth Daily. 1/27/23   Andrea Sanders PA-C   spironolactone (ALDACTONE) 25 MG tablet Take 1 tablet by mouth Daily. 1/27/23   Andrea Sanders PA-C   Thiamine Mononitrate (B1) 100 MG tablet  1/27/23   Provider, MD Leonora   vitamin B-12 (CYANOCOBALAMIN) 1000 MCG tablet Take 1 tablet by mouth Daily. 1/27/23   Andrea Sanders PA-C        Social History:   Social History     Tobacco Use    Smoking status: Every Day     Packs/day: 1.00     Years: 17.00     Pack years: 17.00     Types: Cigarettes     Start date: 2005    Smokeless tobacco: Never   Vaping Use    Vaping Use: Never used   Substance Use Topics    Alcohol use: Not Currently     Comment: stopped approx 1 week ago from 7/19/23    Drug use: No         Review of Systems:  Review of Systems   Constitutional:  Positive for fatigue. Negative for chills, diaphoresis and fever.   HENT:  Negative for congestion, postnasal drip, rhinorrhea and sore throat.    Eyes:  Negative for photophobia.   Respiratory:  Positive for shortness of breath. Negative for cough and chest tightness.    Cardiovascular:  Positive for leg swelling. Negative for chest pain and palpitations.   Gastrointestinal:  Positive for abdominal distention and abdominal pain. Negative for diarrhea, nausea and vomiting.   Genitourinary:  Negative for difficulty urinating, dysuria, flank pain, frequency, hematuria and urgency.   Musculoskeletal:  Negative for neck pain and neck stiffness.   Skin:  Negative for pallor and rash.   Neurological:  Negative for dizziness, syncope, weakness, numbness and headaches.   Hematological:  Negative for adenopathy. Does not bruise/bleed easily.   Psychiatric/Behavioral: Negative.        Physical Exam:  /54 (BP Location: Left arm, Patient Position: Lying)   Pulse 86   Temp 99.3 °F (37.4 °C) (Oral)   Resp 16   Ht 175.3 cm (69\")   Wt 93.7 " kg (206 lb 9.1 oz)   SpO2 93%   BMI 30.51 kg/m²     Physical Exam  Vitals and nursing note reviewed.   Constitutional:       General: He is not in acute distress.     Appearance: Normal appearance. He is not ill-appearing, toxic-appearing or diaphoretic.   HENT:      Head: Normocephalic and atraumatic.      Mouth/Throat:      Mouth: Mucous membranes are moist.   Eyes:      General: Scleral icterus present.      Pupils: Pupils are equal, round, and reactive to light.   Cardiovascular:      Rate and Rhythm: Normal rate and regular rhythm.      Pulses: Normal pulses.           Carotid pulses are 2+ on the right side and 2+ on the left side.       Radial pulses are 2+ on the right side and 2+ on the left side.        Femoral pulses are 2+ on the right side and 2+ on the left side.       Popliteal pulses are 2+ on the right side and 2+ on the left side.        Dorsalis pedis pulses are 2+ on the right side and 2+ on the left side.        Posterior tibial pulses are 2+ on the right side and 2+ on the left side.      Heart sounds: Murmur heard.     Friction rub present.   Pulmonary:      Effort: Pulmonary effort is normal. No accessory muscle usage, respiratory distress or retractions.      Breath sounds: Normal breath sounds. No wheezing, rhonchi or rales.   Abdominal:      General: Abdomen is flat. There is no distension.      Palpations: Abdomen is soft. There is fluid wave and hepatomegaly. There is no mass or pulsatile mass.      Tenderness: There is no abdominal tenderness. There is no right CVA tenderness, left CVA tenderness, guarding or rebound.      Comments: No rigidity    Patient has dilated umbilical veins   Genitourinary:     Testes:         Right: Mass or tenderness not present.   Musculoskeletal:         General: No swelling, tenderness or deformity.      Cervical back: Neck supple. No tenderness.      Right lower leg: No edema.      Left lower leg: No edema.   Skin:     General: Skin is warm and dry.       Capillary Refill: Capillary refill takes less than 2 seconds.      Coloration: Skin is jaundiced. Skin is not pale.      Findings: No erythema.   Neurological:      General: No focal deficit present.      Mental Status: He is alert and oriented to person, place, and time. Mental status is at baseline.      Cranial Nerves: Cranial nerves 2-12 are intact. No cranial nerve deficit.      Sensory: Sensation is intact. No sensory deficit.      Motor: Motor function is intact. No weakness or pronator drift.      Coordination: Coordination is intact. Coordination normal.   Psychiatric:         Mood and Affect: Mood normal.         Behavior: Behavior normal.                Procedures:  Procedures      Medical Decision Making:      Comorbidities that affect care:    Cirrhosis, alcoholic hepatitis    External Notes reviewed:    None      The following orders were placed and all results were independently analyzed by me:  Orders Placed This Encounter   Procedures    Body Fluid Culture - Body Fluid, Peritoneum    Anaerobic Culture - Body Fluid, Peritoneum    Blood Culture - Blood,    Blood Culture - Blood,    Body Fluid Culture - Body Fluid, Peritoneum    Anaerobic Culture - Body Fluid, Peritoneum    XR Chest 1 View    US Liver    US Paracentesis    US Paracentesis    aPTT    Protime-INR    Comprehensive Metabolic Panel    Rumney Draw    Lipase    CBC Auto Differential    VBG with Co-Ox and Electrolytes    Magnesium    Ammonia    Protime-INR    Basic Metabolic Panel    Body Fluid Cell Count With Differential - Body Fluid, Peritoneum    Albumin, Fluid - Body Fluid, Peritoneum    Bilirubin, Body Fluid - Body Fluid, Peritoneum    Lactate Dehydrogenase, Body Fluid - Body Fluid, Peritoneum    Protein, Body Fluid - Body Fluid, Peritoneum    Lactic Acid, Plasma    STAT Lactic Acid, Reflex    Body Fluid Cell Count With Differential - Body Fluid, Peritoneum    Albumin, Fluid - Body Fluid, Peritoneum    Bilirubin, Body Fluid - Body  Fluid, Peritoneum    Amylase, Body Fluid - Body Fluid, Peritoneum    Lactate Dehydrogenase, Body Fluid - Body Fluid, Peritoneum    Glucose, Body Fluid - Body Fluid, Peritoneum    Protein, Body Fluid - Body Fluid, Peritoneum    Urinalysis With Culture If Indicated -    Hepatic Function Panel    Magnesium    Phosphorus    CBC Auto Differential    Body fluid cell count - Body Fluid, Peritoneum    Body fluid cell count - Body Fluid, Peritoneum    Scan Slide    Urinalysis, Microscopic Only - Urine, Clean Catch    Comprehensive Metabolic Panel    Phosphorus    Magnesium    CBC Auto Differential    NPO Diet NPO Type: Strict NPO    Vital Signs    Intake & Output    Weigh Patient    Oral Care    Saline Lock & Maintain IV Access    Place Sequential Compression Device    Maintain Sequential Compression Device    Activity - Ad Maria De Jesus    Daily Weights    Strict Intake & Output    Obtain Informed Consent    Obtain Informed Consent    Continuous Pulse Oximetry    Obtain Baseline Clinical [place] Withdrawal Assessment - Ar (CIWA-Ar), Sedation Scale & Vital Signs    Clinical [place] Withdrawal Assessment (CIWA-Ar)    If CIWA-Ar Score Less Than 8 For 3 Consecutive Assessments, Monitor Every 4 Hours & Discontinue Assessment When CIWA-Ar Less Than 8 for 24 Hours    Obtain Pre & Post Sedation Scores With Every Lorazepam Dose - Hold For POSS Greater Than 2 or RASS of -3 or Less    Notify Provider - Withdrawal    Notify Provider of Abnormal Lab Results    Notify Provider - Vitals    Verify Informed Consent for Blood Product Administration    Code Status and Medical Interventions:    Inpatient Hospitalist Consult    General MD Inpatient Consult    Adult Transthoracic Echo Complete W/ Cont if Necessary Per Protocol    Prepare Fresh Frozen Plasma, 2 Units    ABO / Rh    ABO RH Specimen Verification    Insert Peripheral IV    Inpatient Admission    Seizure Precautions    Safety Precautions    CBC & Differential    Green Top (Gel)    Lavender  Top    Gold Top - SST    Light Blue Top    CBC & Differential    CBC & Differential       Medications Given in the Emergency Department:  Medications   sodium chloride 0.9 % flush 10 mL (10 mL Intravenous Given 7/19/23 2103)   sodium chloride 0.9 % flush 10 mL (has no administration in time range)   sodium chloride 0.9 % infusion 40 mL (has no administration in time range)   sennosides-docusate (PERICOLACE) 8.6-50 MG per tablet 2 tablet (2 tablets Oral Given 7/19/23 2101)     And   polyethylene glycol (MIRALAX) packet 17 g (has no administration in time range)     And   bisacodyl (DULCOLAX) EC tablet 5 mg (has no administration in time range)     And   bisacodyl (DULCOLAX) suppository 10 mg (has no administration in time range)   furosemide (LASIX) injection 40 mg (40 mg Intravenous Given 7/20/23 0215)   spironolactone (ALDACTONE) tablet 25 mg (25 mg Oral Given 7/19/23 0829)   lactulose (CHRONULAC) 10 GM/15ML solution 10 g (10 g Oral Given 7/19/23 2101)   cefTRIAXone (ROCEPHIN) IVPB 1,000 mg (1,000 mg Intravenous New Bag 7/19/23 1002)   nicotine (NICODERM CQ) 21 MG/24HR patch 1 patch (1 patch Transdermal Medication Removed 7/20/23 0215)   albumin human 25 % IV SOLN 25 g (25 g Intravenous Not Given 7/19/23 1013)   thiamine (B-1) injection 200 mg (200 mg Intravenous Given 7/19/23 2103)     Followed by   thiamine (VITAMIN B-1) tablet 100 mg (has no administration in time range)   folic acid (FOLVITE) tablet 1 mg (1 mg Oral Given 7/19/23 0829)   LORazepam (ATIVAN) injection 1 mg (has no administration in time range)     Or   LORazepam (ATIVAN) tablet 2 mg (has no administration in time range)   sodium chloride 0.9 % bolus 1,000 mL (0 mL Intravenous Stopped 7/18/23 2138)   furosemide (LASIX) injection 80 mg (80 mg Intravenous Given 7/18/23 2218)   potassium chloride (MICRO-K) CR capsule 40 mEq (40 mEq Oral Given 7/19/23 1754)        ED Course:         Labs:    Lab Results (last 24 hours)       Procedure Component Value  Units Date/Time    Protime-INR [[id number]]  (Abnormal) Collected: 07/19/23 0501    Specimen: Blood Updated: 07/19/23 0532     Protime 39.3 Seconds      INR 4.18    Narrative:      Suggested Therapeutic Ranges For Oral Anticoagulant Therapy:  Level of Therapy                      INR Target Range  Standard Dose                            2.0-3.0  High Dose                                2.5-3.5  Patients not receiving anticoagulant  Therapy Normal Range                     0.86-1.15    Basic Metabolic Panel [[id number]]  (Abnormal) Collected: 07/19/23 0501    Specimen: Blood Updated: 07/19/23 0541     Glucose 112 mg/dL      BUN 10 mg/dL      Creatinine 0.80 mg/dL      Sodium 124 mmol/L      Potassium 2.8 mmol/L      Chloride 86 mmol/L      CO2 30.8 mmol/L      Calcium 8.0 mg/dL      BUN/Creatinine Ratio 12.5     Anion Gap 7.2 mmol/L      eGFR 115.5 mL/min/1.73     Narrative:      GFR Normal >60  Chronic Kidney Disease <60  Kidney Failure <15      STAT Lactic Acid, Reflex [[id number]]  (Normal) Collected: 07/19/23 0501    Specimen: Blood Updated: 07/19/23 0535     Lactate 1.8 mmol/L     Hepatic Function Panel [[id number]]  (Abnormal) Collected: 07/19/23 0501    Specimen: Blood Updated: 07/19/23 0812     Total Protein 5.6 g/dL      Albumin 2.1 g/dL      ALT (SGPT) 33 U/L      AST (SGOT) 88 U/L      Alkaline Phosphatase 105 U/L      Total Bilirubin 19.9 mg/dL      Bilirubin, Direct >10.0 mg/dL      Bilirubin, Indirect --     Comment: Unable to calculate       Magnesium [[id number]]  (Normal) Collected: 07/19/23 0501    Specimen: Blood Updated: 07/19/23 0800     Magnesium 1.6 mg/dL     Phosphorus [[id number]]  (Normal) Collected: 07/19/23 0501    Specimen: Blood Updated: 07/19/23 0800     Phosphorus 3.2 mg/dL     Urinalysis With Culture If Indicated - Urinary Bladder [[id number]]  (Abnormal) Collected: 07/19/23 0848    Specimen: Urine from Urinary Bladder Updated: 07/19/23 0914     Color, UA Dark Yellow     Appearance, UA  Cloudy     pH, UA 6.0     Specific Gravity, UA 1.012     Glucose, UA Negative     Ketones, UA Negative     Bilirubin, UA Large (3+)     Blood, UA Negative     Protein, UA Negative     Leuk Esterase, UA Trace     Nitrite, UA Positive     Urobilinogen, UA 2.0 E.U./dL    Narrative:      In absence of clinical symptoms, the presence of pyuria, bacteria, and/or nitrites on the urinalysis result does not correlate with infection.    Urinalysis, Microscopic Only - Urinary Bladder [[id number]]  (Abnormal) Collected: 07/19/23 0848    Specimen: Urine from Urinary Bladder Updated: 07/19/23 0914     RBC, UA 0-2 /HPF      WBC, UA 3-5 /HPF      Comment: Urine culture not indicated.        Bacteria, UA Trace /HPF      Squamous Epithelial Cells, UA 3-6 /HPF      Hyaline Casts, UA 3-6 /LPF      Coarse Granular Casts, UA 7-12 /LPF      Methodology Manual Light Microscopy    CBC & Differential [[id number]]  (Abnormal) Collected: 07/19/23 0851    Specimen: Blood Updated: 07/19/23 0942    Narrative:      The following orders were created for panel order CBC & Differential.  Procedure                               Abnormality         Status                     ---------                               -----------         ------                     CBC Auto Differential[[id number]]        Abnormal            Final result               Scan Slide[[id number]]                                       Final result                 Please view results for these tests on the individual orders.    CBC Auto Differential [[id number]]  (Abnormal) Collected: 07/19/23 0851    Specimen: Blood Updated: 07/19/23 0942     WBC 6.52 10*3/mm3      RBC 3.11 10*6/mm3      Hemoglobin 10.8 g/dL      Hematocrit 30.3 %      MCV 97.4 fL      MCH 34.7 pg      MCHC 35.6 g/dL      RDW 15.7 %      RDW-SD 55.4 fl      MPV 10.8 fL      Platelets 87 10*3/mm3      Neutrophil % 71.9 %      Lymphocyte % 6.0 %      Monocyte % 20.1 %      Eosinophil % 0.9 %      Basophil % 0.5 %       Immature Grans % 0.6 %      Neutrophils, Absolute 4.69 10*3/mm3      Lymphocytes, Absolute 0.39 10*3/mm3      Monocytes, Absolute 1.31 10*3/mm3      Eosinophils, Absolute 0.06 10*3/mm3      Basophils, Absolute 0.03 10*3/mm3      Immature Grans, Absolute 0.04 10*3/mm3      nRBC 0.0 /100 WBC     Scan Slide [[id number]] Collected: 07/19/23 0851    Specimen: Blood Updated: 07/19/23 0942     Anisocytosis Slight/1+     Wilmar Cells Slight/1+     WBC Morphology Normal     Platelet Estimate Decreased             Imaging:    Adult Transthoracic Echo Complete W/ Cont if Necessary Per Protocol    Result Date: 7/19/2023    Left ventricular ejection fraction appears to be 56 - 60%.   The left ventricular cavity is mildly dilated.   Left ventricular diastolic function was normal.   Technically difficult study.   No significant valvular disease.        Differential Diagnosis and Discussion:    Edema: Based on the patient's history, signs, and symptoms, the differential diagnosis includes but is not limited to heart failure, kidney disease, liver disease, venous insufficiency, lymphedema, pregnancy, medications, allergic reactions.    All labs were reviewed and interpreted by me.  All X-rays impressions were independently interpreted by me.  EKG was interpreted by me.    MDM  Number of Diagnoses or Management Options  Ascites due to alcoholic hepatitis  Cirrhosis of liver with ascites, unspecified hepatic cirrhosis type  Hyperbilirubinemia  Hyponatremia  Pericardial effusion  Thrombocytopenia  Diagnosis management comments:   Due to the possible murmur at versus pericardial rub.  I performed a bedside ultrasound.  The patient had a moderate pericardial effusion    Patient's vital signs are stable on the emergency room.    Patient's ammonia was normal at 50    The patient's magnesium was normal at 1.9    The patient's chemistry demonstrates a glucose of 130.  The patient's renal function was preserved.  The patient has sodium of 119,  potassium of 3.3, albumin of 2.4, AST of 97, alk phos of 128 and a very elevated bilirubin of 21.7.  The patient had a normal anion gap    The patient's pro time was gapped at 35.6.    The patient's CBC was reviewed and shows no abnormalities of critical concern.  Of note, there is no anemia requiring a blood transfusion and the platelet count is acceptable    The patient's hyponatremia is rechecked by venous gas and the patient's sodium is 118.    Hyponatremia was thought to be pseudohyponatremia due to cirrhosis    The patient's chest x-ray was clear    The patient was subsequently admitted to the hospital due to the large amount of ascites, hyponatremia and severe hyperbilirubinemia.  At the time of admission, the patient was resting comfortably in no acute distress, nontoxic with no altered mental status       Amount and/or Complexity of Data Reviewed  Clinical lab tests: reviewed  Tests in the radiology section of CPT®: reviewed  Tests in the medicine section of CPT®: reviewed  Decide to obtain previous medical records or to obtain history from someone other than the patient: yes  Discuss the patient with other providers: yes (22:17 EDT  I discussed the case with the hospitalist.  We have discussed the patient's presenting symptoms, laboratory values, imaging and condition at the time of admission.  They will evaluate the patient in the emergency room and admit the patient to the hospital)             Social Determinants of Health:    Patient is independent, reliable, and has access to care.       Disposition and Care Coordination:    Admit:   Through independent evaluation of the patient's history, physical, and imperical data, the patient meets criteria for observation/admission to the hospital.        Final diagnoses:   Cirrhosis of liver with ascites, unspecified hepatic cirrhosis type   Ascites due to alcoholic hepatitis   Hyponatremia   Pericardial effusion   Hyperbilirubinemia   Thrombocytopenia         ED Disposition       ED Disposition   Decision to Admit    Condition   --    Comment   Level of Care: Telemetry [5]   Diagnosis: Liver failure [[id number]]   Admitting Physician: FLAKO GODINEZ [[id number]]   Attending Physician: FLAKO GODINEZ [[id number]]   Certification: I Certify That Inpatient Hospital Services Are Medically Necessary For Greater Than 2 Midnights                 This medical record created using voice recognition software.             Renan Friedman DO  07/20/23 0315

## 2023-07-18 NOTE — LETTER
July 22, 2023     Patient: Colin Rowland   YOB: 1985   Date of Visit: 7/18/2023       To Whom It May Concern:    It is my medical opinion that Colin Rowland may return to work 7/26/23. He was admitted on 7/18 and discharged on 7/22 but return to work on 7/26.            Sincerely,    Amanda Tavares RN, BSN

## 2023-07-19 ENCOUNTER — APPOINTMENT (OUTPATIENT)
Dept: INTERVENTIONAL RADIOLOGY/VASCULAR | Facility: HOSPITAL | Age: 38
DRG: 433 | End: 2023-07-19
Payer: COMMERCIAL

## 2023-07-19 ENCOUNTER — APPOINTMENT (OUTPATIENT)
Dept: CARDIOLOGY | Facility: HOSPITAL | Age: 38
DRG: 433 | End: 2023-07-19
Payer: COMMERCIAL

## 2023-07-19 LAB
ABO GROUP BLD: NORMAL
ABO GROUP BLD: NORMAL
ALBUMIN SERPL-MCNC: 2.1 G/DL (ref 3.5–5.2)
ALP SERPL-CCNC: 105 U/L (ref 39–117)
ALT SERPL W P-5'-P-CCNC: 33 U/L (ref 1–41)
ANION GAP SERPL CALCULATED.3IONS-SCNC: 11.8 MMOL/L (ref 5–15)
ANION GAP SERPL CALCULATED.3IONS-SCNC: 7.2 MMOL/L (ref 5–15)
ANISOCYTOSIS BLD QL: NORMAL
AST SERPL-CCNC: 88 U/L (ref 1–40)
BACTERIA UR QL AUTO: ABNORMAL /HPF
BASOPHILS # BLD AUTO: 0.03 10*3/MM3 (ref 0–0.2)
BASOPHILS NFR BLD AUTO: 0.5 % (ref 0–1.5)
BH CV ECHO MEAS - EDV(CUBED): 197.1 ML
BH CV ECHO MEAS - EDV(MOD-SP2): 88.3 ML
BH CV ECHO MEAS - EDV(MOD-SP4): 121 ML
BH CV ECHO MEAS - EF(MOD-BP): 60.9 %
BH CV ECHO MEAS - EF(MOD-SP2): 66.7 %
BH CV ECHO MEAS - EF(MOD-SP4): 57.4 %
BH CV ECHO MEAS - ESV(CUBED): 46.3 ML
BH CV ECHO MEAS - ESV(MOD-SP2): 29.4 ML
BH CV ECHO MEAS - ESV(MOD-SP4): 51.5 ML
BH CV ECHO MEAS - FS: 38.3 %
BH CV ECHO MEAS - IVS/LVPW: 0.87 CM
BH CV ECHO MEAS - IVSD: 0.87 CM
BH CV ECHO MEAS - LA DIMENSION: 3.9 CM
BH CV ECHO MEAS - LAT PEAK E' VEL: 17.6 CM/SEC
BH CV ECHO MEAS - LV DIASTOLIC VOL/BSA (35-75): 62.9 CM2
BH CV ECHO MEAS - LV MASS(C)D: 212.9 GRAMS
BH CV ECHO MEAS - LV SYSTOLIC VOL/BSA (12-30): 26.8 CM2
BH CV ECHO MEAS - LVIDD: 5.8 CM
BH CV ECHO MEAS - LVIDS: 3.6 CM
BH CV ECHO MEAS - LVOT AREA: 6.2 CM2
BH CV ECHO MEAS - LVOT DIAM: 2.8 CM
BH CV ECHO MEAS - LVPWD: 0.99 CM
BH CV ECHO MEAS - MED PEAK E' VEL: 9.9 CM/SEC
BH CV ECHO MEAS - MV A MAX VEL: 75.4 CM/SEC
BH CV ECHO MEAS - MV DEC SLOPE: 417 CM/SEC2
BH CV ECHO MEAS - MV DEC TIME: 0.14 MSEC
BH CV ECHO MEAS - MV E MAX VEL: 58.7 CM/SEC
BH CV ECHO MEAS - MV E/A: 0.78
BH CV ECHO MEAS - RVDD: 2.7 CM
BH CV ECHO MEAS - SI(MOD-SP2): 30.6 ML/M2
BH CV ECHO MEAS - SI(MOD-SP4): 36.1 ML/M2
BH CV ECHO MEAS - SV(MOD-SP2): 58.9 ML
BH CV ECHO MEAS - SV(MOD-SP4): 69.5 ML
BH CV ECHO MEASUREMENTS AVERAGE E/E' RATIO: 4.27
BILIRUB CONJ SERPL-MCNC: >10 MG/DL (ref 0–0.3)
BILIRUB INDIRECT SERPL-MCNC: ABNORMAL MG/DL
BILIRUB SERPL-MCNC: 19.9 MG/DL (ref 0–1.2)
BILIRUB UR QL STRIP: ABNORMAL
BUN SERPL-MCNC: 10 MG/DL (ref 6–20)
BUN SERPL-MCNC: 11 MG/DL (ref 6–20)
BUN/CREAT SERPL: 12.5 (ref 7–25)
BUN/CREAT SERPL: 16.7 (ref 7–25)
BURR CELLS BLD QL SMEAR: NORMAL
CALCIUM SPEC-SCNC: 8 MG/DL (ref 8.6–10.5)
CALCIUM SPEC-SCNC: 8.2 MG/DL (ref 8.6–10.5)
CHLORIDE SERPL-SCNC: 85 MMOL/L (ref 98–107)
CHLORIDE SERPL-SCNC: 86 MMOL/L (ref 98–107)
CLARITY UR: ABNORMAL
CO2 SERPL-SCNC: 24.2 MMOL/L (ref 22–29)
CO2 SERPL-SCNC: 30.8 MMOL/L (ref 22–29)
COARSE GRAN CASTS URNS QL MICRO: ABNORMAL /LPF
COLOR UR: ABNORMAL
CREAT SERPL-MCNC: 0.66 MG/DL (ref 0.76–1.27)
CREAT SERPL-MCNC: 0.8 MG/DL (ref 0.76–1.27)
D-LACTATE SERPL-SCNC: 1.8 MMOL/L (ref 0.5–2)
D-LACTATE SERPL-SCNC: 2.2 MMOL/L (ref 0.5–2)
DEPRECATED RDW RBC AUTO: 55.4 FL (ref 37–54)
EGFRCR SERPLBLD CKD-EPI 2021: 115.5 ML/MIN/1.73
EGFRCR SERPLBLD CKD-EPI 2021: 122.4 ML/MIN/1.73
EOSINOPHIL # BLD AUTO: 0.06 10*3/MM3 (ref 0–0.4)
EOSINOPHIL NFR BLD AUTO: 0.9 % (ref 0.3–6.2)
ERYTHROCYTE [DISTWIDTH] IN BLOOD BY AUTOMATED COUNT: 15.7 % (ref 12.3–15.4)
GLUCOSE SERPL-MCNC: 112 MG/DL (ref 65–99)
GLUCOSE SERPL-MCNC: 122 MG/DL (ref 65–99)
GLUCOSE UR STRIP-MCNC: NEGATIVE MG/DL
HCT VFR BLD AUTO: 30.3 % (ref 37.5–51)
HGB BLD-MCNC: 10.8 G/DL (ref 13–17.7)
HGB UR QL STRIP.AUTO: NEGATIVE
HYALINE CASTS UR QL AUTO: ABNORMAL /LPF
IMM GRANULOCYTES # BLD AUTO: 0.04 10*3/MM3 (ref 0–0.05)
IMM GRANULOCYTES NFR BLD AUTO: 0.6 % (ref 0–0.5)
INR PPP: 3.77 (ref 0.86–1.15)
INR PPP: 4.18 (ref 0.86–1.15)
KETONES UR QL STRIP: NEGATIVE
LEFT ATRIUM VOLUME INDEX: 16 ML/M2
LEUKOCYTE ESTERASE UR QL STRIP.AUTO: ABNORMAL
LYMPHOCYTES # BLD AUTO: 0.39 10*3/MM3 (ref 0.7–3.1)
LYMPHOCYTES NFR BLD AUTO: 6 % (ref 19.6–45.3)
MAGNESIUM SERPL-MCNC: 1.6 MG/DL (ref 1.6–2.6)
MCH RBC QN AUTO: 34.7 PG (ref 26.6–33)
MCHC RBC AUTO-ENTMCNC: 35.6 G/DL (ref 31.5–35.7)
MCV RBC AUTO: 97.4 FL (ref 79–97)
MONOCYTES # BLD AUTO: 1.31 10*3/MM3 (ref 0.1–0.9)
MONOCYTES NFR BLD AUTO: 20.1 % (ref 5–12)
NEUTROPHILS NFR BLD AUTO: 4.69 10*3/MM3 (ref 1.7–7)
NEUTROPHILS NFR BLD AUTO: 71.9 % (ref 42.7–76)
NITRITE UR QL STRIP: POSITIVE
NRBC BLD AUTO-RTO: 0 /100 WBC (ref 0–0.2)
PH UR STRIP.AUTO: 6 [PH] (ref 5–8)
PHOSPHATE SERPL-MCNC: 3.2 MG/DL (ref 2.5–4.5)
PLATELET # BLD AUTO: 87 10*3/MM3 (ref 140–450)
PMV BLD AUTO: 10.8 FL (ref 6–12)
POTASSIUM SERPL-SCNC: 2.8 MMOL/L (ref 3.5–5.2)
POTASSIUM SERPL-SCNC: 3.3 MMOL/L (ref 3.5–5.2)
PROT SERPL-MCNC: 5.6 G/DL (ref 6–8.5)
PROT UR QL STRIP: NEGATIVE
PROTHROMBIN TIME: 36.4 SECONDS (ref 11.8–14.9)
PROTHROMBIN TIME: 39.3 SECONDS (ref 11.8–14.9)
RBC # BLD AUTO: 3.11 10*6/MM3 (ref 4.14–5.8)
RBC # UR STRIP: ABNORMAL /HPF
REF LAB TEST METHOD: ABNORMAL
RH BLD: POSITIVE
RH BLD: POSITIVE
SMALL PLATELETS BLD QL SMEAR: NORMAL
SODIUM SERPL-SCNC: 121 MMOL/L (ref 136–145)
SODIUM SERPL-SCNC: 124 MMOL/L (ref 136–145)
SP GR UR STRIP: 1.01 (ref 1–1.03)
SQUAMOUS #/AREA URNS HPF: ABNORMAL /HPF
UROBILINOGEN UR QL STRIP: ABNORMAL
WBC # UR STRIP: ABNORMAL /HPF
WBC MORPH BLD: NORMAL
WBC NRBC COR # BLD: 6.52 10*3/MM3 (ref 3.4–10.8)

## 2023-07-19 PROCEDURE — 86927 PLASMA FRESH FROZEN: CPT

## 2023-07-19 PROCEDURE — 36430 TRANSFUSION BLD/BLD COMPNT: CPT

## 2023-07-19 PROCEDURE — 94799 UNLISTED PULMONARY SVC/PX: CPT

## 2023-07-19 PROCEDURE — 94761 N-INVAS EAR/PLS OXIMETRY MLT: CPT

## 2023-07-19 PROCEDURE — 86900 BLOOD TYPING SEROLOGIC ABO: CPT

## 2023-07-19 PROCEDURE — 25010000002 CEFTRIAXONE PER 250 MG: Performed by: FAMILY MEDICINE

## 2023-07-19 PROCEDURE — 93306 TTE W/DOPPLER COMPLETE: CPT

## 2023-07-19 PROCEDURE — 83735 ASSAY OF MAGNESIUM: CPT | Performed by: STUDENT IN AN ORGANIZED HEALTH CARE EDUCATION/TRAINING PROGRAM

## 2023-07-19 PROCEDURE — 83605 ASSAY OF LACTIC ACID: CPT | Performed by: FAMILY MEDICINE

## 2023-07-19 PROCEDURE — 99233 SBSQ HOSP IP/OBS HIGH 50: CPT | Performed by: STUDENT IN AN ORGANIZED HEALTH CARE EDUCATION/TRAINING PROGRAM

## 2023-07-19 PROCEDURE — 93306 TTE W/DOPPLER COMPLETE: CPT | Performed by: INTERNAL MEDICINE

## 2023-07-19 PROCEDURE — 81001 URINALYSIS AUTO W/SCOPE: CPT | Performed by: STUDENT IN AN ORGANIZED HEALTH CARE EDUCATION/TRAINING PROGRAM

## 2023-07-19 PROCEDURE — 0W9G3ZZ DRAINAGE OF PERITONEAL CAVITY, PERCUTANEOUS APPROACH: ICD-10-PCS | Performed by: STUDENT IN AN ORGANIZED HEALTH CARE EDUCATION/TRAINING PROGRAM

## 2023-07-19 PROCEDURE — 85610 PROTHROMBIN TIME: CPT | Performed by: FAMILY MEDICINE

## 2023-07-19 PROCEDURE — 80076 HEPATIC FUNCTION PANEL: CPT | Performed by: STUDENT IN AN ORGANIZED HEALTH CARE EDUCATION/TRAINING PROGRAM

## 2023-07-19 PROCEDURE — 85007 BL SMEAR W/DIFF WBC COUNT: CPT | Performed by: STUDENT IN AN ORGANIZED HEALTH CARE EDUCATION/TRAINING PROGRAM

## 2023-07-19 PROCEDURE — 87040 BLOOD CULTURE FOR BACTERIA: CPT | Performed by: FAMILY MEDICINE

## 2023-07-19 PROCEDURE — 85025 COMPLETE CBC W/AUTO DIFF WBC: CPT | Performed by: STUDENT IN AN ORGANIZED HEALTH CARE EDUCATION/TRAINING PROGRAM

## 2023-07-19 PROCEDURE — P9059 PLASMA, FRZ BETWEEN 8-24HOUR: HCPCS

## 2023-07-19 PROCEDURE — 84100 ASSAY OF PHOSPHORUS: CPT | Performed by: STUDENT IN AN ORGANIZED HEALTH CARE EDUCATION/TRAINING PROGRAM

## 2023-07-19 PROCEDURE — 86901 BLOOD TYPING SEROLOGIC RH(D): CPT

## 2023-07-19 PROCEDURE — 80048 BASIC METABOLIC PNL TOTAL CA: CPT | Performed by: FAMILY MEDICINE

## 2023-07-19 PROCEDURE — 25010000002 FUROSEMIDE PER 20 MG: Performed by: FAMILY MEDICINE

## 2023-07-19 PROCEDURE — 25010000002 THIAMINE PER 100 MG: Performed by: STUDENT IN AN ORGANIZED HEALTH CARE EDUCATION/TRAINING PROGRAM

## 2023-07-19 RX ORDER — THIAMINE HYDROCHLORIDE 100 MG/ML
200 INJECTION, SOLUTION INTRAMUSCULAR; INTRAVENOUS EVERY 8 HOURS SCHEDULED
Status: DISCONTINUED | OUTPATIENT
Start: 2023-07-19 | End: 2023-07-22 | Stop reason: HOSPADM

## 2023-07-19 RX ORDER — LORAZEPAM 2 MG/1
2 TABLET ORAL
Status: DISCONTINUED | OUTPATIENT
Start: 2023-07-19 | End: 2023-07-22 | Stop reason: HOSPADM

## 2023-07-19 RX ORDER — SODIUM CHLORIDE 0.9 % (FLUSH) 0.9 %
10 SYRINGE (ML) INJECTION EVERY 12 HOURS SCHEDULED
Status: DISCONTINUED | OUTPATIENT
Start: 2023-07-19 | End: 2023-07-22 | Stop reason: HOSPADM

## 2023-07-19 RX ORDER — SPIRONOLACTONE 25 MG/1
25 TABLET ORAL DAILY
Status: DISCONTINUED | OUTPATIENT
Start: 2023-07-19 | End: 2023-07-22 | Stop reason: HOSPADM

## 2023-07-19 RX ORDER — BISACODYL 5 MG/1
5 TABLET, DELAYED RELEASE ORAL DAILY PRN
Status: DISCONTINUED | OUTPATIENT
Start: 2023-07-19 | End: 2023-07-20

## 2023-07-19 RX ORDER — POLYETHYLENE GLYCOL 3350 17 G/17G
17 POWDER, FOR SOLUTION ORAL DAILY PRN
Status: DISCONTINUED | OUTPATIENT
Start: 2023-07-19 | End: 2023-07-20

## 2023-07-19 RX ORDER — BISACODYL 10 MG
10 SUPPOSITORY, RECTAL RECTAL DAILY PRN
Status: DISCONTINUED | OUTPATIENT
Start: 2023-07-19 | End: 2023-07-20

## 2023-07-19 RX ORDER — SODIUM CHLORIDE 0.9 % (FLUSH) 0.9 %
10 SYRINGE (ML) INJECTION AS NEEDED
Status: DISCONTINUED | OUTPATIENT
Start: 2023-07-19 | End: 2023-07-22 | Stop reason: HOSPADM

## 2023-07-19 RX ORDER — LACTULOSE 10 G/15ML
10 SOLUTION ORAL 3 TIMES DAILY
Status: DISCONTINUED | OUTPATIENT
Start: 2023-07-19 | End: 2023-07-22 | Stop reason: HOSPADM

## 2023-07-19 RX ORDER — NICOTINE 21 MG/24HR
1 PATCH, TRANSDERMAL 24 HOURS TRANSDERMAL
Status: DISCONTINUED | OUTPATIENT
Start: 2023-07-19 | End: 2023-07-22 | Stop reason: HOSPADM

## 2023-07-19 RX ORDER — FOLIC ACID 1 MG/1
1 TABLET ORAL DAILY
Status: DISCONTINUED | OUTPATIENT
Start: 2023-07-19 | End: 2023-07-22 | Stop reason: HOSPADM

## 2023-07-19 RX ORDER — CEFTRIAXONE SODIUM 1 G/50ML
1000 INJECTION, SOLUTION INTRAVENOUS DAILY
Status: DISCONTINUED | OUTPATIENT
Start: 2023-07-19 | End: 2023-07-20

## 2023-07-19 RX ORDER — AMOXICILLIN 250 MG
2 CAPSULE ORAL 2 TIMES DAILY
Status: DISCONTINUED | OUTPATIENT
Start: 2023-07-19 | End: 2023-07-20

## 2023-07-19 RX ORDER — ALBUMIN (HUMAN) 12.5 G/50ML
25 SOLUTION INTRAVENOUS ONCE
Status: DISCONTINUED | OUTPATIENT
Start: 2023-07-19 | End: 2023-07-22 | Stop reason: HOSPADM

## 2023-07-19 RX ORDER — SODIUM CHLORIDE 9 MG/ML
40 INJECTION, SOLUTION INTRAVENOUS AS NEEDED
Status: DISCONTINUED | OUTPATIENT
Start: 2023-07-19 | End: 2023-07-22 | Stop reason: HOSPADM

## 2023-07-19 RX ORDER — POTASSIUM CHLORIDE 750 MG/1
40 CAPSULE, EXTENDED RELEASE ORAL 2 TIMES DAILY WITH MEALS
Status: COMPLETED | OUTPATIENT
Start: 2023-07-19 | End: 2023-07-19

## 2023-07-19 RX ORDER — METHION/INOS/CHOL BT/B COM/LIV 110MG-86MG
100 CAPSULE ORAL DAILY
Status: DISCONTINUED | OUTPATIENT
Start: 2023-07-24 | End: 2023-07-22 | Stop reason: HOSPADM

## 2023-07-19 RX ORDER — LORAZEPAM 2 MG/ML
1 INJECTION INTRAMUSCULAR
Status: DISCONTINUED | OUTPATIENT
Start: 2023-07-19 | End: 2023-07-22 | Stop reason: HOSPADM

## 2023-07-19 RX ORDER — FUROSEMIDE 10 MG/ML
40 INJECTION INTRAMUSCULAR; INTRAVENOUS EVERY 12 HOURS
Status: DISCONTINUED | OUTPATIENT
Start: 2023-07-19 | End: 2023-07-21

## 2023-07-19 RX ADMIN — POTASSIUM CHLORIDE 40 MEQ: 750 CAPSULE, EXTENDED RELEASE ORAL at 08:28

## 2023-07-19 RX ADMIN — Medication 10 ML: at 21:03

## 2023-07-19 RX ADMIN — THIAMINE HYDROCHLORIDE 200 MG: 100 INJECTION, SOLUTION INTRAMUSCULAR; INTRAVENOUS at 08:29

## 2023-07-19 RX ADMIN — POTASSIUM CHLORIDE 40 MEQ: 750 CAPSULE, EXTENDED RELEASE ORAL at 17:54

## 2023-07-19 RX ADMIN — SPIRONOLACTONE 25 MG: 25 TABLET ORAL at 08:29

## 2023-07-19 RX ADMIN — Medication 10 ML: at 08:29

## 2023-07-19 RX ADMIN — THIAMINE HYDROCHLORIDE 200 MG: 100 INJECTION, SOLUTION INTRAMUSCULAR; INTRAVENOUS at 13:03

## 2023-07-19 RX ADMIN — Medication 1 MG: at 08:29

## 2023-07-19 RX ADMIN — Medication 10 ML: at 01:42

## 2023-07-19 RX ADMIN — DOCUSATE SODIUM 50MG AND SENNOSIDES 8.6MG 2 TABLET: 8.6; 5 TABLET, FILM COATED ORAL at 21:01

## 2023-07-19 RX ADMIN — LACTULOSE 10 G: 20 SOLUTION ORAL at 16:33

## 2023-07-19 RX ADMIN — THIAMINE HYDROCHLORIDE 200 MG: 100 INJECTION, SOLUTION INTRAMUSCULAR; INTRAVENOUS at 21:03

## 2023-07-19 RX ADMIN — FUROSEMIDE 40 MG: 10 INJECTION, SOLUTION INTRAMUSCULAR; INTRAVENOUS at 01:42

## 2023-07-19 RX ADMIN — DOCUSATE SODIUM 50MG AND SENNOSIDES 8.6MG 2 TABLET: 8.6; 5 TABLET, FILM COATED ORAL at 08:29

## 2023-07-19 RX ADMIN — LACTULOSE 10 G: 20 SOLUTION ORAL at 21:01

## 2023-07-19 RX ADMIN — LACTULOSE 10 G: 20 SOLUTION ORAL at 08:29

## 2023-07-19 RX ADMIN — NICOTINE 1 PATCH: 21 PATCH, EXTENDED RELEASE TRANSDERMAL at 01:41

## 2023-07-19 RX ADMIN — FUROSEMIDE 40 MG: 10 INJECTION, SOLUTION INTRAMUSCULAR; INTRAVENOUS at 13:03

## 2023-07-19 RX ADMIN — CEFTRIAXONE SODIUM 1000 MG: 1 INJECTION, SOLUTION INTRAVENOUS at 10:02

## 2023-07-19 NOTE — PLAN OF CARE
Goal Outcome Evaluation:  Plan of Care Reviewed With: patient        Progress: no change       Patient alert and able to make needs known. Patient had ECHO this morning. Will have paracentesis tomorrow. Having FFP today. Aware of medical condition and tolerating well.

## 2023-07-19 NOTE — PROGRESS NOTES
[facility]   Hospitalist Progress Note  Date: 2023  Patient Name: Colin Rowland  : 1985  MRN: [id number]  Date of admission: 2023      Subjective   Subjective     Chief Complaint: Abdominal distention    Summary: 38-year-old male with history of EtOH abuse and alcoholic cirrhosis who presents with a chief complaint of abdominal distention.  Patient notes that his last drink was around 1 week ago.  Patient has been admitted and gastroenterology have been consulted.  Patient started on Rocephin for SBP prophylaxis.    Interval Followup: Patient lying in bed upon my evaluation.  He appears grossly jaundiced.  He tells me that he is still able to tolerate oral intake and does not complain of difficulty with respirations.  He has not any fevers or chills.  Discussed with him that we will do paracentesis tomorrow his INR is elevated.  Patient to receive FFP today.      Review of Systems  All systems reviewed and are negative except as above in HPI.    Objective   Objective     Vitals:   Temp:  [98.1 °F (36.7 °C)-98.4 °F (36.9 °C)] 98.3 °F (36.8 °C)  Heart Rate:  [] 84  Resp:  [16-21] 16  BP: (122-136)/(52-79) 125/54  Physical Exam    Constitutional: Awake, alert, laying in bed   Eyes: Pupils equal, sclerae anicteric, no conjunctival injection   HENT: NCAT, mucous membranes moist   Neck: Supple, no thyromegaly, no lymphadenopathy, trachea midline   Respiratory: Clear to auscultation bilaterally, nonlabored respirations    Cardiovascular: RRR, no murmurs, rubs, or gallops, palpable pedal pulses bilaterally   Gastrointestinal: Positive bowel sounds, soft, nontender, distended   Musculoskeletal: No bilateral ankle edema, no clubbing or cyanosis to extremities   Psychiatric: Appropriate affect, cooperative   Neurologic: Follows commands, no slurred speech, no facial asymmetry   Skin: No rashes, jaundiced    Result Review    Result Review:  I have personally reviewed the results from the time of  this admission to 7/19/2023 07:40 EDT and agree with these findings:  []  Laboratory  []  Microbiology  []  Radiology  []  EKG/Telemetry   []  Cardiology/Vascular   []  Pathology  []  Old records  []  Other:    Assessment & Plan   Assessment / Plan     Assessment:  Acute decompensated alcoholic cirrhosis  Concern for SBP  Pseudohyponatremia secondary to hypervolemia  Concern for pericardial effusion  Anemia of chronic disease  Hypokalemia  Thrombocytopenia secondary to alcoholic cirrhosis  Supratherapeutic INR due to underlying hepatic disease  Hypoalbuminemia    Plan:  Continue to monitor as inpatient  Gastroenterology assistance appreciated this patient, recommending paracentesis  We will order FFP for paracentesis as INR is elevated greater than 4, recheck in a.m.  Obtain fluid studies post paracentesis on 7/20  If patient does not have evidence of SBP will place on prednisolone 40 mg daily  Continue with Rocephin for SBP prophylaxis at this time  Continue with CIWA protocol given history of EtOH abuse, benzodiazepines in place as needed, will avoid use as much as possible  Replenish potassium, continue to monitor  Place patient on fluid restriction, continue monitor I's and O's, daily weights  Await results of echocardiogram given concerns for pericardial effusion  Blood count remained stable, continue to monitor, recheck CBC in a.m.  CBC, CMP, exam, phosphorus reviewed, recheck in follow-up in a.m.  Continue nicotine patch, patient counseled importance of smoking cessation record  Further recommendations pending clinical course      Discussed plan with gastroenterology, RN.    DVT prophylaxis:  Mechanical DVT prophylaxis orders are present.    CODE STATUS:   Code Status (Patient has no pulse and is not breathing): CPR (Attempt to Resuscitate)  Medical Interventions (Patient has pulse or is breathing): Full Support  Release to patient: Routine Release        Electronically signed by Gonzalo Orozco DO, 07/19/23,  7:40 AM EDT.

## 2023-07-19 NOTE — H&P
[facility]  [facility]   Patient Name: Colin Rowland  : 1985  MRN: [id number]  Primary Care Physician:  Andrea Sanders PA-C  Date of admission: 2023    Subjective   Subjective     Chief Complaint: Abdominal distention    HPI:    Colin Rowland is a 38 y.o. male arrives emergency department for evaluation of abdominal distention for approximately 1 week duration.  Patient has a known history of liver cirrhosis, history of alcoholism in the past.  Patient states that he relapsed and started drinking approximately 4 months ago, and stopped drinking 1 week ago.  The abdominal distention started approximately the time he stopped drinking.  He contacted his PCP, who recommended he come to his ED for paracentesis.  Patient has associated shortness of breath, swelling in his lower extremities.  Patient denies any abdominal pain, fevers, chills, myalgias, bleeding, constipation, diarrhea, nausea, vomiting, chest pain, headache.      Personal History     Past Medical History:  Past Medical History:   Diagnosis Date    Gout     Liver disease          Past Surgical History:  Past Surgical History:   Procedure Laterality Date    MYRINGOTOMY      bilateral    TONSILLECTOMY           Family History:   Family History   Problem Relation Age of Onset    Diabetes Mother     Hypertension Mother     Hypertension Father     Diabetes Maternal Grandmother     Diabetes Maternal Grandfather          Social History:   Social History     Tobacco Use    Smoking status: Every Day     Packs/day: 1.00     Years: 17.00     Pack years: 17.00     Types: Cigarettes     Start date:     Smokeless tobacco: Never   Vaping Use    Vaping Use: Never used   Substance Use Topics    Alcohol use: No     Comment: Stopped 2019    Drug use: No         Home Medications:  B1, buPROPion XL, fluticasone, folic acid, spironolactone, and vitamin B-12    Allergies:  No Known Allergies    Review  of Systems   All systems were reviewed and negative except for: Abdominal distention    Objective   Objective     Vitals:   Temp:  [98.1 °F (36.7 °C)] 98.1 °F (36.7 °C)  Heart Rate:  [82-99] 87  Resp:  [17-21] 17  BP: (126-133)/(57-79) 126/59    Physical Exam    Constitutional: Awake, alert, no acute distress, chronically ill-appearing, grossly icteric   Eyes: Pupils equal, sclerae icteric, no conjunctival injection   HENT: NCAT, mucous membranes moist   Neck: Supple, no thyromegaly, no lymphadenopathy, trachea midline   Respiratory: Clear to auscultation bilaterally, nonlabored respirations    Cardiovascular: RRR, no murmurs, rubs, or gallops, palpable pedal pulses bilaterally   Gastrointestinal: Positive bowel sounds, soft, nontender, distended, moderate palpable fluid wave appreciated   Musculoskeletal: 4+ pitting edema bilateral lower extremities, no clubbing or cyanosis to extremities   Psychiatric: Appropriate affect, cooperative   Neurologic: Oriented x 3, strength symmetric in all extremities, Cranial Nerves grossly intact to confrontation, speech clear   Skin: No rashes     Result Review    Result Review:  I have personally reviewed the results from the time of this admission to 7/18/2023 22:19 EDT and agree with these findings:  [x]  Laboratory  []  Microbiology  [x]  Radiology  []  EKG/Telemetry   []  Cardiology/Vascular   []  Pathology  [x]  Old records  []  Other:      Assessment & Plan   Assessment / Plan     Assessment/Plan:   Acute decompensated alcoholic liver cirrhosis-patient has abstained from drinking for approximately 1 week duration.  MELD score 35, there is a recent history of drinking, patient is likely not to be a transplant candidate.  GI will be consulted in the a.m., we appreciate the recommendations in the care of this patient.  Rocephin 1 g IV for SBP prophylaxis.  Paracentesis.  Pseudohyponatremia-likely secondary to fluid overload.  We will begin diuresis.  Questionable pericardial  effusion-Per ED physician report, ultrasound at bedside showed pericardial effusion without tamponade physiology.  Get a formal transthoracic echo to evaluate.  Fluid overload-plan as above.  Anemia of chronic disease-no recent symptoms of bleeding, no bleeding at time of evaluation.  Continue to monitor.  Hypokalemia-replete, recheck      DVT prophylaxis:  No DVT prophylaxis order currently exists.    CODE STATUS: Full code    Admission Status:  I believe this patient meets inpatient status.    Electronically signed by Leland Marquez DO, 07/18/23, 10:19 PM EDT.

## 2023-07-19 NOTE — CONSULTS
Chief Complaint  Abdominal Pain    History of Present Illness  Colin Rowland is a 38 y.o. male with history of chronic liver disease secondary to alcohol chronic alcohol abuse, gout, myringotomy, tonsillectomy, who presents to [facility]LO[facility] on referral from No ref. provider found for a gastroenterology evaluation of jaundice.  Patient has been drinking alcohol up till just few days ago when he decided to quit.  In the hospital because he was not doing well his abdomen was increasing in girth he knows he has chronic liver disease and should not drink so he was avoided and decided to come to the hospital.  States for last 4 to 5 days he has not had any alcohol.  Denies any rectal bleeding or melena or hematemesis.  Denies any fever shakes or chills.  Denies any abdominal pain or nausea vomiting.        Labs Result Review Imaging    Past Medical History:   Diagnosis Date    Gout     Liver disease        Past Surgical History:   Procedure Laterality Date    MYRINGOTOMY      bilateral    TONSILLECTOMY           Current Facility-Administered Medications:     albumin human 25 % IV SOLN 25 g, 25 g, Intravenous, Once, Gonzalo Orozco DO    sennosides-docusate (PERICOLACE) 8.6-50 MG per tablet 2 tablet, 2 tablet, Oral, BID, 2 tablet at 07/19/23 0829 **AND** polyethylene glycol (MIRALAX) packet 17 g, 17 g, Oral, Daily PRN **AND** bisacodyl (DULCOLAX) EC tablet 5 mg, 5 mg, Oral, Daily PRN **AND** bisacodyl (DULCOLAX) suppository 10 mg, 10 mg, Rectal, Daily PRN, Leland Marquez DO    cefTRIAXone (ROCEPHIN) IVPB 1,000 mg, 1,000 mg, Intravenous, Daily, Leland Marquez DO, Last Rate: 100 mL/hr at 07/19/23 1002, 1,000 mg at 07/19/23 1002    folic acid (FOLVITE) tablet 1 mg, 1 mg, Oral, Daily, Gonzalo Orozco DO, 1 mg at 07/19/23 0829    furosemide (LASIX) injection 40 mg, 40 mg, Intravenous, Q12H, Leland Marquez DO, 40 mg at 07/19/23 1303    lactulose (CHRONULAC) 10 GM/15ML solution 10 g,  "10 g, Oral, TID, Leland Marquez DO, 10 g at 07/19/23 0829    LORazepam (ATIVAN) injection 1 mg, 1 mg, Intravenous, Q1H PRN **OR** LORazepam (ATIVAN) tablet 2 mg, 2 mg, Oral, Q1H PRN, Gonzalo Orozco DO    nicotine (NICODERM CQ) 21 MG/24HR patch 1 patch, 1 patch, Transdermal, Q24H, Hedy Bauer PA-C, 1 patch at 07/19/23 0141    potassium chloride (MICRO-K) CR capsule 40 mEq, 40 mEq, Oral, BID With Meals, Gonzalo Orozco DO, 40 mEq at 07/19/23 0828    sodium chloride 0.9 % flush 10 mL, 10 mL, Intravenous, Q12H, Leland Marquez DO, 10 mL at 07/19/23 0829    sodium chloride 0.9 % flush 10 mL, 10 mL, Intravenous, PRN, Leland Marquez DO    sodium chloride 0.9 % infusion 40 mL, 40 mL, Intravenous, PRN, Leland Marquez DO    spironolactone (ALDACTONE) tablet 25 mg, 25 mg, Oral, Daily, Leland Marquez DO, 25 mg at 07/19/23 0829    thiamine (B-1) injection 200 mg, 200 mg, Intravenous, Q8H, 200 mg at 07/19/23 1303 **FOLLOWED BY** [START ON 7/24/2023] thiamine (VITAMIN B-1) tablet 100 mg, 100 mg, Oral, Daily, Gonzalo Orozco DO     No Known Allergies    Family History   Problem Relation Age of Onset    Diabetes Mother     Hypertension Mother     Hypertension Father     Diabetes Maternal Grandmother     Diabetes Maternal Grandfather         Social History     Social History Narrative    Not on file   Positive for alcohol use and positive for smoking negative for other drugs    Immunization:  Immunization History   Administered Date(s) Administered    Tdap 05/08/2022        Objective     Review of Systems review of systems 10 system review is negative except as mentioned HPI    Vital Signs:   /66 (BP Location: Left arm, Patient Position: Lying)   Pulse 84   Temp 98.1 °F (36.7 °C) (Oral)   Resp 16   Ht 175.3 cm (69\")   Wt 93.7 kg (206 lb 9.1 oz)   SpO2 94%   BMI 30.51 kg/m²       Physical Exam  Constitutional:       General: He is awake. He is not in acute distress.     Appearance: Normal appearance. He " is well-developed and well-groomed.   HENT:      Head: Normocephalic and atraumatic.      Comments: Positive scleral icterus     Mouth/Throat:      Mouth: Mucous membranes are moist.      Comments: Dev dental hygiene is good  Eyes:      General: Lids are normal.      Conjunctiva/sclera: Conjunctivae normal.      Pupils: Pupils are equal, round, and reactive to light.   Neck:      Thyroid: No thyroid mass.      Trachea: Trachea normal.   Cardiovascular:      Rate and Rhythm: Normal rate and regular rhythm.      Heart sounds: Normal heart sounds.   Pulmonary:      Effort: Pulmonary effort is normal.      Breath sounds: Normal breath sounds and air entry.   Abdominal:      General: Abdomen is flat. Bowel sounds are normal. There is no distension.      Palpations: Abdomen is soft. There is no mass.      Tenderness: There is no abdominal tenderness. There is no guarding.      Comments: Positive ascites   Musculoskeletal:      Cervical back: Neck supple.      Right lower leg: No edema.      Left lower leg: No edema.   Skin:     General: Skin is warm and moist.      Coloration: Skin is not cyanotic, jaundiced or pale.      Findings: No rash.      Nails: There is no clubbing.   Neurological:      Mental Status: He is alert and oriented to person, place, and time.   Psychiatric:         Attention and Perception: Attention normal.         Mood and Affect: Mood and affect normal.         Speech: Speech normal.       Labs:  Results from last 7 days   Lab Units 07/19/23  0851 07/18/23  1756   WBC 10*3/mm3 6.52 6.57   HEMOGLOBIN g/dL 10.8* 12.1*   HEMATOCRIT % 30.3* 33.4*   PLATELETS 10*3/mm3 87* 98*      Results from last 7 days   Lab Units 07/19/23  0501 07/19/23  0110 07/18/23  1934 07/18/23  1756   SODIUM mmol/L 124* 121*  --  119*   SODIUM, VENOUS mmol/L  --   --  118.2*  --    POTASSIUM mmol/L 2.8* 3.3*  --  3.3*   POTASSIUM, VENOUS mmol/L  --   --  3.4*  --    CHLORIDE mmol/L 86* 85*  --  83*   CHLORIDE, VENOUS mmol/L  --    --  86*  --    CO2 mmol/L 30.8* 24.2  --  27.9   BUN mg/dL 10 11  --  11   CREATININE mg/dL 0.80 0.66*  --  0.78   CALCIUM mg/dL 8.0* 8.2*  --  8.2*   BILIRUBIN mg/dL 19.9*  --   --  21.7*   ALK PHOS U/L 105  --   --  128*   ALT (SGPT) U/L 33  --   --  39   AST (SGOT) U/L 88*  --   --  97*   GLUCOSE mg/dL 112* 122*  --  130*   GLUCOSE, ARTERIAL mg/dL  --   --  98  --       Results from last 7 days   Lab Units 07/19/23  0501 07/19/23  0110 07/18/23  1756   INR  4.18* 3.77* 3.67*        Assessment & Plan:  Principal Problem:    Liver failure  Acute alcoholic hepatitis    I agree with paracentesis and ruling out SBP.  After that patient should be started on prednisone 40 mg once a day.  Have counseled the patient thoroughly about the risk of continued drinking.    Patient was given instructions and counseling regarding his condition or for health maintenance advice. Please see specific information pulled into the AVS if appropriate.        Signed:  Alirio Childress MD  07/19/23  13:16 EDT

## 2023-07-20 ENCOUNTER — APPOINTMENT (OUTPATIENT)
Dept: ULTRASOUND IMAGING | Facility: HOSPITAL | Age: 38
DRG: 433 | End: 2023-07-20
Payer: COMMERCIAL

## 2023-07-20 ENCOUNTER — APPOINTMENT (OUTPATIENT)
Dept: INTERVENTIONAL RADIOLOGY/VASCULAR | Facility: HOSPITAL | Age: 38
DRG: 433 | End: 2023-07-20
Payer: COMMERCIAL

## 2023-07-20 LAB
ALBUMIN SERPL-MCNC: 2.4 G/DL (ref 3.5–5.2)
ALBUMIN/GLOB SERPL: 0.6 G/DL
ALP SERPL-CCNC: 144 U/L (ref 39–117)
ALT SERPL W P-5'-P-CCNC: 37 U/L (ref 1–41)
ANION GAP SERPL CALCULATED.3IONS-SCNC: 8.7 MMOL/L (ref 5–15)
APPEARANCE FLD: ABNORMAL
AST SERPL-CCNC: 92 U/L (ref 1–40)
BASOPHILS # BLD AUTO: 0.02 10*3/MM3 (ref 0–0.2)
BASOPHILS NFR BLD AUTO: 0.4 % (ref 0–1.5)
BILIRUB SERPL-MCNC: 18.4 MG/DL (ref 0–1.2)
BUN SERPL-MCNC: 9 MG/DL (ref 6–20)
BUN/CREAT SERPL: 11.7 (ref 7–25)
CALCIUM SPEC-SCNC: 8 MG/DL (ref 8.6–10.5)
CHLORIDE SERPL-SCNC: 88 MMOL/L (ref 98–107)
CO2 SERPL-SCNC: 30.3 MMOL/L (ref 22–29)
COLOR FLD: YELLOW
CREAT SERPL-MCNC: 0.77 MG/DL (ref 0.76–1.27)
DEPRECATED RDW RBC AUTO: 55.8 FL (ref 37–54)
EGFRCR SERPLBLD CKD-EPI 2021: 116.8 ML/MIN/1.73
EOSINOPHIL # BLD AUTO: 0.08 10*3/MM3 (ref 0–0.4)
EOSINOPHIL NFR BLD AUTO: 1.4 % (ref 0.3–6.2)
ERYTHROCYTE [DISTWIDTH] IN BLOOD BY AUTOMATED COUNT: 15.9 % (ref 12.3–15.4)
GLOBULIN UR ELPH-MCNC: 4 GM/DL
GLUCOSE SERPL-MCNC: 91 MG/DL (ref 65–99)
HCT VFR BLD AUTO: 30.9 % (ref 37.5–51)
HGB BLD-MCNC: 11 G/DL (ref 13–17.7)
IMM GRANULOCYTES # BLD AUTO: 0.04 10*3/MM3 (ref 0–0.05)
IMM GRANULOCYTES NFR BLD AUTO: 0.7 % (ref 0–0.5)
INR PPP: 2.83 (ref 0.86–1.15)
LYMPHOCYTES # BLD AUTO: 0.42 10*3/MM3 (ref 0.7–3.1)
LYMPHOCYTES NFR BLD AUTO: 7.6 % (ref 19.6–45.3)
LYMPHOCYTES NFR FLD MANUAL: 53 %
MACROPHAGE FLUID: 24 %
MAGNESIUM SERPL-MCNC: 1.6 MG/DL (ref 1.6–2.6)
MCH RBC QN AUTO: 34.8 PG (ref 26.6–33)
MCHC RBC AUTO-ENTMCNC: 35.6 G/DL (ref 31.5–35.7)
MCV RBC AUTO: 97.8 FL (ref 79–97)
MONOCYTES # BLD AUTO: 1.08 10*3/MM3 (ref 0.1–0.9)
MONOCYTES NFR BLD AUTO: 19.5 % (ref 5–12)
MONOCYTES NFR FLD: 9 %
NEUTROPHILS NFR BLD AUTO: 3.9 10*3/MM3 (ref 1.7–7)
NEUTROPHILS NFR BLD AUTO: 70.4 % (ref 42.7–76)
NEUTROPHILS NFR FLD MANUAL: 12 %
NRBC BLD AUTO-RTO: 0 /100 WBC (ref 0–0.2)
NUC CELL # FLD: 255 /MM3
PHOSPHATE SERPL-MCNC: 2.8 MG/DL (ref 2.5–4.5)
PLASMA CELLS NFR FLD: 2 %
PLATELET # BLD AUTO: 74 10*3/MM3 (ref 140–450)
PMV BLD AUTO: 10.1 FL (ref 6–12)
POTASSIUM SERPL-SCNC: 2.7 MMOL/L (ref 3.5–5.2)
PROT SERPL-MCNC: 6.4 G/DL (ref 6–8.5)
PROTHROMBIN TIME: 29.3 SECONDS (ref 11.8–14.9)
RBC # BLD AUTO: 3.16 10*6/MM3 (ref 4.14–5.8)
RBC # FLD AUTO: <2000 /MM3
SODIUM SERPL-SCNC: 127 MMOL/L (ref 136–145)
WBC NRBC COR # BLD: 5.54 10*3/MM3 (ref 3.4–10.8)

## 2023-07-20 PROCEDURE — 25010000002 THIAMINE PER 100 MG: Performed by: STUDENT IN AN ORGANIZED HEALTH CARE EDUCATION/TRAINING PROGRAM

## 2023-07-20 PROCEDURE — P9059 PLASMA, FRZ BETWEEN 8-24HOUR: HCPCS

## 2023-07-20 PROCEDURE — 76942 ECHO GUIDE FOR BIOPSY: CPT

## 2023-07-20 PROCEDURE — 86927 PLASMA FRESH FROZEN: CPT

## 2023-07-20 PROCEDURE — 82042 OTHER SOURCE ALBUMIN QUAN EA: CPT | Performed by: STUDENT IN AN ORGANIZED HEALTH CARE EDUCATION/TRAINING PROGRAM

## 2023-07-20 PROCEDURE — 83735 ASSAY OF MAGNESIUM: CPT | Performed by: STUDENT IN AN ORGANIZED HEALTH CARE EDUCATION/TRAINING PROGRAM

## 2023-07-20 PROCEDURE — 89051 BODY FLUID CELL COUNT: CPT | Performed by: STUDENT IN AN ORGANIZED HEALTH CARE EDUCATION/TRAINING PROGRAM

## 2023-07-20 PROCEDURE — 76705 ECHO EXAM OF ABDOMEN: CPT

## 2023-07-20 PROCEDURE — 82247 BILIRUBIN TOTAL: CPT | Performed by: STUDENT IN AN ORGANIZED HEALTH CARE EDUCATION/TRAINING PROGRAM

## 2023-07-20 PROCEDURE — 82945 GLUCOSE OTHER FLUID: CPT | Performed by: STUDENT IN AN ORGANIZED HEALTH CARE EDUCATION/TRAINING PROGRAM

## 2023-07-20 PROCEDURE — 25010000002 FUROSEMIDE PER 20 MG: Performed by: FAMILY MEDICINE

## 2023-07-20 PROCEDURE — 36430 TRANSFUSION BLD/BLD COMPNT: CPT

## 2023-07-20 PROCEDURE — 80053 COMPREHEN METABOLIC PANEL: CPT | Performed by: STUDENT IN AN ORGANIZED HEALTH CARE EDUCATION/TRAINING PROGRAM

## 2023-07-20 PROCEDURE — 82150 ASSAY OF AMYLASE: CPT | Performed by: STUDENT IN AN ORGANIZED HEALTH CARE EDUCATION/TRAINING PROGRAM

## 2023-07-20 PROCEDURE — 84100 ASSAY OF PHOSPHORUS: CPT | Performed by: STUDENT IN AN ORGANIZED HEALTH CARE EDUCATION/TRAINING PROGRAM

## 2023-07-20 PROCEDURE — 87070 CULTURE OTHR SPECIMN AEROBIC: CPT | Performed by: STUDENT IN AN ORGANIZED HEALTH CARE EDUCATION/TRAINING PROGRAM

## 2023-07-20 PROCEDURE — 87205 SMEAR GRAM STAIN: CPT | Performed by: STUDENT IN AN ORGANIZED HEALTH CARE EDUCATION/TRAINING PROGRAM

## 2023-07-20 PROCEDURE — 99233 SBSQ HOSP IP/OBS HIGH 50: CPT | Performed by: STUDENT IN AN ORGANIZED HEALTH CARE EDUCATION/TRAINING PROGRAM

## 2023-07-20 PROCEDURE — 25010000002 CEFTRIAXONE PER 250 MG: Performed by: FAMILY MEDICINE

## 2023-07-20 PROCEDURE — 83615 LACTATE (LD) (LDH) ENZYME: CPT | Performed by: STUDENT IN AN ORGANIZED HEALTH CARE EDUCATION/TRAINING PROGRAM

## 2023-07-20 PROCEDURE — 85610 PROTHROMBIN TIME: CPT | Performed by: FAMILY MEDICINE

## 2023-07-20 PROCEDURE — 85025 COMPLETE CBC W/AUTO DIFF WBC: CPT | Performed by: STUDENT IN AN ORGANIZED HEALTH CARE EDUCATION/TRAINING PROGRAM

## 2023-07-20 PROCEDURE — 87075 CULTR BACTERIA EXCEPT BLOOD: CPT | Performed by: STUDENT IN AN ORGANIZED HEALTH CARE EDUCATION/TRAINING PROGRAM

## 2023-07-20 PROCEDURE — C1729 CATH, DRAINAGE: HCPCS

## 2023-07-20 PROCEDURE — 87015 SPECIMEN INFECT AGNT CONCNTJ: CPT | Performed by: STUDENT IN AN ORGANIZED HEALTH CARE EDUCATION/TRAINING PROGRAM

## 2023-07-20 RX ORDER — LIDOCAINE HYDROCHLORIDE 20 MG/ML
20 INJECTION, SOLUTION INFILTRATION; PERINEURAL ONCE
Status: COMPLETED | OUTPATIENT
Start: 2023-07-20 | End: 2023-07-20

## 2023-07-20 RX ORDER — POLYETHYLENE GLYCOL 3350 17 G/17G
17 POWDER, FOR SOLUTION ORAL DAILY PRN
Status: DISCONTINUED | OUTPATIENT
Start: 2023-07-20 | End: 2023-07-22 | Stop reason: HOSPADM

## 2023-07-20 RX ORDER — BISACODYL 5 MG/1
5 TABLET, DELAYED RELEASE ORAL DAILY PRN
Status: DISCONTINUED | OUTPATIENT
Start: 2023-07-20 | End: 2023-07-22 | Stop reason: HOSPADM

## 2023-07-20 RX ORDER — AMOXICILLIN 250 MG
2 CAPSULE ORAL 2 TIMES DAILY PRN
Status: DISCONTINUED | OUTPATIENT
Start: 2023-07-20 | End: 2023-07-22 | Stop reason: HOSPADM

## 2023-07-20 RX ORDER — POTASSIUM CHLORIDE 750 MG/1
40 CAPSULE, EXTENDED RELEASE ORAL 2 TIMES DAILY WITH MEALS
Status: COMPLETED | OUTPATIENT
Start: 2023-07-20 | End: 2023-07-20

## 2023-07-20 RX ORDER — BISACODYL 10 MG
10 SUPPOSITORY, RECTAL RECTAL DAILY PRN
Status: DISCONTINUED | OUTPATIENT
Start: 2023-07-20 | End: 2023-07-22 | Stop reason: HOSPADM

## 2023-07-20 RX ADMIN — NICOTINE 1 PATCH: 21 PATCH, EXTENDED RELEASE TRANSDERMAL at 08:40

## 2023-07-20 RX ADMIN — FUROSEMIDE 40 MG: 10 INJECTION, SOLUTION INTRAMUSCULAR; INTRAVENOUS at 15:16

## 2023-07-20 RX ADMIN — SPIRONOLACTONE 25 MG: 25 TABLET ORAL at 08:41

## 2023-07-20 RX ADMIN — FUROSEMIDE 40 MG: 10 INJECTION, SOLUTION INTRAMUSCULAR; INTRAVENOUS at 02:15

## 2023-07-20 RX ADMIN — POTASSIUM CHLORIDE 40 MEQ: 750 CAPSULE, EXTENDED RELEASE ORAL at 09:24

## 2023-07-20 RX ADMIN — Medication 10 ML: at 08:41

## 2023-07-20 RX ADMIN — THIAMINE HYDROCHLORIDE 200 MG: 100 INJECTION, SOLUTION INTRAMUSCULAR; INTRAVENOUS at 06:51

## 2023-07-20 RX ADMIN — Medication 1 MG: at 08:41

## 2023-07-20 RX ADMIN — THIAMINE HYDROCHLORIDE 200 MG: 100 INJECTION, SOLUTION INTRAMUSCULAR; INTRAVENOUS at 15:16

## 2023-07-20 RX ADMIN — SODIUM BICARBONATE 1 MEQ: 84 INJECTION INTRAVENOUS at 14:20

## 2023-07-20 RX ADMIN — LIDOCAINE HYDROCHLORIDE 9 ML: 20 INJECTION, SOLUTION INFILTRATION; PERINEURAL at 14:20

## 2023-07-20 RX ADMIN — CEFTRIAXONE SODIUM 1000 MG: 1 INJECTION, SOLUTION INTRAVENOUS at 08:40

## 2023-07-20 RX ADMIN — RIFAXIMIN 550 MG: 550 TABLET ORAL at 21:18

## 2023-07-20 RX ADMIN — LACTULOSE 10 G: 20 SOLUTION ORAL at 15:16

## 2023-07-20 RX ADMIN — Medication 10 ML: at 21:19

## 2023-07-20 RX ADMIN — LACTULOSE 10 G: 20 SOLUTION ORAL at 21:17

## 2023-07-20 RX ADMIN — POTASSIUM CHLORIDE 40 MEQ: 750 CAPSULE, EXTENDED RELEASE ORAL at 17:49

## 2023-07-20 RX ADMIN — LACTULOSE 10 G: 20 SOLUTION ORAL at 08:40

## 2023-07-20 RX ADMIN — THIAMINE HYDROCHLORIDE 200 MG: 100 INJECTION, SOLUTION INTRAMUSCULAR; INTRAVENOUS at 21:18

## 2023-07-20 RX ADMIN — RIFAXIMIN 550 MG: 550 TABLET ORAL at 09:25

## 2023-07-20 NOTE — PLAN OF CARE
Problem: Adult Inpatient Plan of Care  Goal: Plan of Care Review  Outcome: Ongoing, Progressing  Goal: Patient-Specific Goal (Individualized)  Outcome: Ongoing, Progressing  Goal: Absence of Hospital-Acquired Illness or Injury  Outcome: Ongoing, Progressing  Intervention: Identify and Manage Fall Risk  Recent Flowsheet Documentation  Taken 7/19/2023 1915 by Valerie Webster RN  Safety Promotion/Fall Prevention: safety round/check completed  Intervention: Prevent Skin Injury  Recent Flowsheet Documentation  Taken 7/19/2023 1915 by Valerie Webster RN  Body Position: position changed independently  Intervention: Prevent and Manage VTE (Venous Thromboembolism) Risk  Recent Flowsheet Documentation  Taken 7/19/2023 1915 by Valerie Webster RN  Activity Management: up ad vivian  Goal: Optimal Comfort and Wellbeing  Outcome: Ongoing, Progressing  Intervention: Provide Person-Centered Care  Recent Flowsheet Documentation  Taken 7/19/2023 1915 by Valerie Webster RN  Trust Relationship/Rapport:   care explained   choices provided   emotional support provided   empathic listening provided   questions answered   questions encouraged   reassurance provided   thoughts/feelings acknowledged  Goal: Readiness for Transition of Care  Outcome: Ongoing, Progressing     Problem: Pain Acute  Goal: Acceptable Pain Control and Functional Ability  Outcome: Ongoing, Progressing  Intervention: Prevent or Manage Pain  Recent Flowsheet Documentation  Taken 7/19/2023 1915 by Valerie Webster RN  Sleep/Rest Enhancement:   consistent schedule promoted   regular sleep/rest pattern promoted  Medication Review/Management: medications reviewed  Intervention: Optimize Psychosocial Wellbeing  Recent Flowsheet Documentation  Taken 7/19/2023 1915 by Valerie Webster RN  Supportive Measures: active listening utilized  Diversional Activities:   smartphone   television   Goal Outcome Evaluation:

## 2023-07-20 NOTE — PLAN OF CARE
Goal Outcome Evaluation:  Plan of Care Reviewed With: patient        Progress: no change          Patient alert and able to make needs known. Given one unit FFP. Tolerated well. Currently at paracentesis

## 2023-07-20 NOTE — PROGRESS NOTES
[facility]   Hospitalist Progress Note  Date: 2023  Patient Name: Colin Rowland  : 1985  MRN: [id number]  Date of admission: 2023      Subjective   Subjective     Chief Complaint: Abdominal distention    Summary: 38-year-old male with history of EtOH abuse and alcoholic cirrhosis who presents with a chief complaint of abdominal distention.  Patient notes that his last drink was around 1 week ago.  Patient has been admitted and gastroenterology have been consulted.  Patient started on Rocephin for SBP prophylaxis.    Interval Followup: No overnight.  Patient plan for paracentesis today.  He still remains grossly jaundiced.  He is tolerating oral intake.  He denies any fevers or chills.  He has not had any signs of alcohol withdrawal.  Potassium low this morning subsequent replenished.  He is urinating frequently as well.      Review of Systems  All systems reviewed and are negative except as above in HPI.    Objective   Objective     Vitals:   Temp:  [98.1 °F (36.7 °C)-99.3 °F (37.4 °C)] 98.5 °F (36.9 °C)  Heart Rate:  [] 80  Resp:  [16-18] 16  BP: (118-129)/(54-67) 123/56  Physical Exam    Constitutional: Awake, alert, laying in bed   Eyes: Pupils equal, sclerae anicteric, no conjunctival injection   HENT: NCAT, mucous membranes moist   Neck: Supple, no thyromegaly, no lymphadenopathy, trachea midline   Respiratory: Clear to auscultation bilaterally, nonlabored respirations    Cardiovascular: RRR, no murmurs, rubs, or gallops, palpable pedal pulses bilaterally   Gastrointestinal: Positive bowel sounds, soft, nontender, distended   Musculoskeletal: No bilateral ankle edema, no clubbing or cyanosis to extremities   Psychiatric: Appropriate affect, cooperative   Neurologic: Follows commands, no slurred speech, no facial asymmetry   Skin: No rashes, jaundiced    Result Review    Result Review:  I have personally reviewed the results from the time of this admission to 2023 14:01 EDT and  agree with these findings:  []  Laboratory  []  Microbiology  []  Radiology  []  EKG/Telemetry   []  Cardiology/Vascular   []  Pathology  []  Old records  []  Other:    Assessment & Plan   Assessment / Plan     Assessment:  Acute decompensated alcoholic cirrhosis  Concern for SBP  Pseudohyponatremia secondary to hypervolemia  Concern for pericardial effusion  Anemia of chronic disease  Hypokalemia  Thrombocytopenia secondary to alcoholic cirrhosis  Supratherapeutic INR due to underlying hepatic disease  Hypoalbuminemia    Plan:  Continue to monitor as inpatient  Gastroenterology assistance appreciated this patient  Patient plan for paracentesis today, await results and if no neutrophils will discontinue ceftriaxone and start methylprednisolone 40 mg daily  INR still elevated, will give additional unit of FFP today  Obtain fluid studies post paracentesis on 7/20  Replenish potassium, continue to monitor  Continue with CIWA protocol given history of EtOH abuse, benzodiazepines in place as needed, will avoid use as much as possible  Continue with fluid restriction, continue monitor I's and O's, daily weights  Echocardiogram with ejection fraction 56-60%, no significant valvular disease  Blood count remained stable, continue to monitor, recheck CBC in a.m.  CBC, CMP, exam, phosphorus reviewed, recheck in follow-up in a.m.  Continue nicotine patch, patient counseled importance of smoking cessation record  Further recommendations pending clinical course      Discussed plan with gastroenterology, RN.    DVT prophylaxis:  Mechanical DVT prophylaxis orders are present.    CODE STATUS:   Code Status (Patient has no pulse and is not breathing): CPR (Attempt to Resuscitate)  Medical Interventions (Patient has pulse or is breathing): Full Support  Release to patient: Routine Release        Electronically signed by Gonzalo Orozco DO, 07/20/23, 2:05 PM EDT.

## 2023-07-21 LAB
ALBUMIN FLD-MCNC: <0.2 G/DL
ALBUMIN SERPL-MCNC: 2.5 G/DL (ref 3.5–5.2)
ALBUMIN/GLOB SERPL: 0.7 G/DL
ALP SERPL-CCNC: 141 U/L (ref 39–117)
ALT SERPL W P-5'-P-CCNC: 35 U/L (ref 1–41)
AMYLASE FLD-CCNC: 19 U/L
ANION GAP SERPL CALCULATED.3IONS-SCNC: 7.2 MMOL/L (ref 5–15)
AST SERPL-CCNC: 86 U/L (ref 1–40)
BASOPHILS # BLD AUTO: 0.03 10*3/MM3 (ref 0–0.2)
BASOPHILS NFR BLD AUTO: 0.5 % (ref 0–1.5)
BH BB BLOOD EXPIRATION DATE: NORMAL
BH BB BLOOD TYPE BARCODE: 600
BH BB BLOOD TYPE BARCODE: 6200
BH BB BLOOD TYPE BARCODE: 6200
BH BB DISPENSE STATUS: NORMAL
BH BB PRODUCT CODE: NORMAL
BH BB UNIT NUMBER: NORMAL
BILIRUB SERPL-MCNC: 16.4 MG/DL (ref 0–1.2)
BUN SERPL-MCNC: 8 MG/DL (ref 6–20)
BUN/CREAT SERPL: 12.1 (ref 7–25)
CALCIUM SPEC-SCNC: 8.2 MG/DL (ref 8.6–10.5)
CHLORIDE SERPL-SCNC: 90 MMOL/L (ref 98–107)
CO2 SERPL-SCNC: 32.8 MMOL/L (ref 22–29)
CREAT SERPL-MCNC: 0.66 MG/DL (ref 0.76–1.27)
DEPRECATED RDW RBC AUTO: 57.7 FL (ref 37–54)
EGFRCR SERPLBLD CKD-EPI 2021: 122.4 ML/MIN/1.73
EOSINOPHIL # BLD AUTO: 0.13 10*3/MM3 (ref 0–0.4)
EOSINOPHIL NFR BLD AUTO: 2.2 % (ref 0.3–6.2)
ERYTHROCYTE [DISTWIDTH] IN BLOOD BY AUTOMATED COUNT: 16 % (ref 12.3–15.4)
GLOBULIN UR ELPH-MCNC: 3.7 GM/DL
GLUCOSE FLD-MCNC: 122 MG/DL
GLUCOSE SERPL-MCNC: 100 MG/DL (ref 65–99)
HCT VFR BLD AUTO: 29 % (ref 37.5–51)
HGB BLD-MCNC: 10.3 G/DL (ref 13–17.7)
IMM GRANULOCYTES # BLD AUTO: 0.04 10*3/MM3 (ref 0–0.05)
IMM GRANULOCYTES NFR BLD AUTO: 0.7 % (ref 0–0.5)
LDH FLD-CCNC: 37 U/L
LYMPHOCYTES # BLD AUTO: 0.51 10*3/MM3 (ref 0.7–3.1)
LYMPHOCYTES NFR BLD AUTO: 8.5 % (ref 19.6–45.3)
MAGNESIUM SERPL-MCNC: 1.6 MG/DL (ref 1.6–2.6)
MCH RBC QN AUTO: 35.2 PG (ref 26.6–33)
MCHC RBC AUTO-ENTMCNC: 35.5 G/DL (ref 31.5–35.7)
MCV RBC AUTO: 99 FL (ref 79–97)
MONOCYTES # BLD AUTO: 1.29 10*3/MM3 (ref 0.1–0.9)
MONOCYTES NFR BLD AUTO: 21.6 % (ref 5–12)
NEUTROPHILS NFR BLD AUTO: 3.97 10*3/MM3 (ref 1.7–7)
NEUTROPHILS NFR BLD AUTO: 66.5 % (ref 42.7–76)
NRBC BLD AUTO-RTO: 0 /100 WBC (ref 0–0.2)
PLATELET # BLD AUTO: 74 10*3/MM3 (ref 140–450)
PMV BLD AUTO: 10 FL (ref 6–12)
POTASSIUM SERPL-SCNC: 2.7 MMOL/L (ref 3.5–5.2)
PROT SERPL-MCNC: 6.2 G/DL (ref 6–8.5)
RBC # BLD AUTO: 2.93 10*6/MM3 (ref 4.14–5.8)
SODIUM SERPL-SCNC: 130 MMOL/L (ref 136–145)
UNIT  ABO: NORMAL
UNIT  RH: NORMAL
WBC NRBC COR # BLD: 5.97 10*3/MM3 (ref 3.4–10.8)

## 2023-07-21 PROCEDURE — 25010000002 FUROSEMIDE PER 20 MG: Performed by: STUDENT IN AN ORGANIZED HEALTH CARE EDUCATION/TRAINING PROGRAM

## 2023-07-21 PROCEDURE — 83735 ASSAY OF MAGNESIUM: CPT | Performed by: STUDENT IN AN ORGANIZED HEALTH CARE EDUCATION/TRAINING PROGRAM

## 2023-07-21 PROCEDURE — 0 POTASSIUM CHLORIDE 10 MEQ/100ML SOLUTION: Performed by: STUDENT IN AN ORGANIZED HEALTH CARE EDUCATION/TRAINING PROGRAM

## 2023-07-21 PROCEDURE — 99233 SBSQ HOSP IP/OBS HIGH 50: CPT | Performed by: STUDENT IN AN ORGANIZED HEALTH CARE EDUCATION/TRAINING PROGRAM

## 2023-07-21 PROCEDURE — 25010000002 CEFTRIAXONE PER 250 MG: Performed by: STUDENT IN AN ORGANIZED HEALTH CARE EDUCATION/TRAINING PROGRAM

## 2023-07-21 PROCEDURE — 80053 COMPREHEN METABOLIC PANEL: CPT | Performed by: STUDENT IN AN ORGANIZED HEALTH CARE EDUCATION/TRAINING PROGRAM

## 2023-07-21 PROCEDURE — 25010000002 FUROSEMIDE PER 20 MG: Performed by: FAMILY MEDICINE

## 2023-07-21 PROCEDURE — 25010000002 MAGNESIUM SULFATE 2 GM/50ML SOLUTION: Performed by: STUDENT IN AN ORGANIZED HEALTH CARE EDUCATION/TRAINING PROGRAM

## 2023-07-21 PROCEDURE — 25010000002 THIAMINE PER 100 MG: Performed by: STUDENT IN AN ORGANIZED HEALTH CARE EDUCATION/TRAINING PROGRAM

## 2023-07-21 PROCEDURE — 63710000001 METHYLPREDNISOLONE PER 4 MG: Performed by: STUDENT IN AN ORGANIZED HEALTH CARE EDUCATION/TRAINING PROGRAM

## 2023-07-21 PROCEDURE — 85025 COMPLETE CBC W/AUTO DIFF WBC: CPT | Performed by: STUDENT IN AN ORGANIZED HEALTH CARE EDUCATION/TRAINING PROGRAM

## 2023-07-21 RX ORDER — METHYLPREDNISOLONE 4 MG/1
40 TABLET ORAL
Status: DISCONTINUED | OUTPATIENT
Start: 2023-07-21 | End: 2023-07-22 | Stop reason: HOSPADM

## 2023-07-21 RX ORDER — POTASSIUM CHLORIDE 7.45 MG/ML
10 INJECTION INTRAVENOUS
Status: COMPLETED | OUTPATIENT
Start: 2023-07-21 | End: 2023-07-21

## 2023-07-21 RX ORDER — MAGNESIUM SULFATE HEPTAHYDRATE 40 MG/ML
2 INJECTION, SOLUTION INTRAVENOUS ONCE
Status: COMPLETED | OUTPATIENT
Start: 2023-07-21 | End: 2023-07-21

## 2023-07-21 RX ORDER — POTASSIUM CHLORIDE 750 MG/1
40 CAPSULE, EXTENDED RELEASE ORAL DAILY
Status: DISCONTINUED | OUTPATIENT
Start: 2023-07-21 | End: 2023-07-22 | Stop reason: HOSPADM

## 2023-07-21 RX ORDER — FUROSEMIDE 10 MG/ML
40 INJECTION INTRAMUSCULAR; INTRAVENOUS 3 TIMES DAILY
Status: DISCONTINUED | OUTPATIENT
Start: 2023-07-21 | End: 2023-07-22 | Stop reason: HOSPADM

## 2023-07-21 RX ORDER — PANTOPRAZOLE SODIUM 40 MG/1
40 TABLET, DELAYED RELEASE ORAL
Status: DISCONTINUED | OUTPATIENT
Start: 2023-07-21 | End: 2023-07-22 | Stop reason: HOSPADM

## 2023-07-21 RX ADMIN — THIAMINE HYDROCHLORIDE 200 MG: 100 INJECTION, SOLUTION INTRAMUSCULAR; INTRAVENOUS at 22:46

## 2023-07-21 RX ADMIN — PANTOPRAZOLE SODIUM 40 MG: 40 TABLET, DELAYED RELEASE ORAL at 18:26

## 2023-07-21 RX ADMIN — METHYLPREDNISOLONE 40 MG: 4 TABLET ORAL at 18:26

## 2023-07-21 RX ADMIN — POTASSIUM CHLORIDE 40 MEQ: 750 CAPSULE, EXTENDED RELEASE ORAL at 10:35

## 2023-07-21 RX ADMIN — FUROSEMIDE 40 MG: 10 INJECTION, SOLUTION INTRAMUSCULAR; INTRAVENOUS at 22:46

## 2023-07-21 RX ADMIN — CEFTRIAXONE SODIUM 2000 MG: 2 INJECTION, POWDER, FOR SOLUTION INTRAMUSCULAR; INTRAVENOUS at 08:42

## 2023-07-21 RX ADMIN — POTASSIUM CHLORIDE 10 MEQ: 7.46 INJECTION, SOLUTION INTRAVENOUS at 11:37

## 2023-07-21 RX ADMIN — FUROSEMIDE 40 MG: 10 INJECTION, SOLUTION INTRAMUSCULAR; INTRAVENOUS at 10:35

## 2023-07-21 RX ADMIN — MAGNESIUM SULFATE IN WATER 2 G: 40 INJECTION, SOLUTION INTRAVENOUS at 10:35

## 2023-07-21 RX ADMIN — POTASSIUM CHLORIDE 10 MEQ: 7.46 INJECTION, SOLUTION INTRAVENOUS at 10:35

## 2023-07-21 RX ADMIN — Medication 10 ML: at 22:47

## 2023-07-21 RX ADMIN — POTASSIUM CHLORIDE 10 MEQ: 7.46 INJECTION, SOLUTION INTRAVENOUS at 10:36

## 2023-07-21 RX ADMIN — THIAMINE HYDROCHLORIDE 200 MG: 100 INJECTION, SOLUTION INTRAMUSCULAR; INTRAVENOUS at 07:29

## 2023-07-21 RX ADMIN — FUROSEMIDE 40 MG: 10 INJECTION, SOLUTION INTRAMUSCULAR; INTRAVENOUS at 15:57

## 2023-07-21 RX ADMIN — LACTULOSE 10 G: 20 SOLUTION ORAL at 22:47

## 2023-07-21 RX ADMIN — Medication 1 MG: at 08:41

## 2023-07-21 RX ADMIN — FUROSEMIDE 40 MG: 10 INJECTION, SOLUTION INTRAMUSCULAR; INTRAVENOUS at 02:16

## 2023-07-21 RX ADMIN — NICOTINE 1 PATCH: 21 PATCH, EXTENDED RELEASE TRANSDERMAL at 08:42

## 2023-07-21 RX ADMIN — POTASSIUM CHLORIDE 10 MEQ: 7.46 INJECTION, SOLUTION INTRAVENOUS at 13:23

## 2023-07-21 RX ADMIN — RIFAXIMIN 550 MG: 550 TABLET ORAL at 08:41

## 2023-07-21 RX ADMIN — LACTULOSE 10 G: 20 SOLUTION ORAL at 15:57

## 2023-07-21 RX ADMIN — THIAMINE HYDROCHLORIDE 200 MG: 100 INJECTION, SOLUTION INTRAMUSCULAR; INTRAVENOUS at 14:19

## 2023-07-21 RX ADMIN — Medication 10 ML: at 08:41

## 2023-07-21 RX ADMIN — RIFAXIMIN 550 MG: 550 TABLET ORAL at 22:46

## 2023-07-21 RX ADMIN — SPIRONOLACTONE 25 MG: 25 TABLET ORAL at 08:42

## 2023-07-21 RX ADMIN — LACTULOSE 10 G: 20 SOLUTION ORAL at 08:41

## 2023-07-21 NOTE — PLAN OF CARE
Goal Outcome Evaluation:  POC Reviewed with: Patient  Status: Progressing    Patient pleasant and cooperative with care. No complaints of pain or nausea, up ad vivian in room independently. VSS.

## 2023-07-21 NOTE — PROGRESS NOTES
[facility]   Hospitalist Progress Note  Date: 2023  Patient Name: Colin Rowland  : 1985  MRN: [id number]  Date of admission: 2023      Subjective   Subjective     Chief Complaint: Abdominal distention    Summary: 38-year-old male with history of EtOH abuse and alcoholic cirrhosis who presents with a chief complaint of abdominal distention.  Patient notes that his last drink was around 1 week ago.  Patient has been admitted and gastroenterology have been consulted.  Patient started on Rocephin for SBP prophylaxis.    Interval Followup: No events overnight.  Patient improving today.  He is not as jaundiced as before.  His potassium is low subsequent replenished.  He did have paracentesis that showed no SBP.    Review of Systems  All systems reviewed and are negative except as above in HPI.    Objective   Objective     Vitals:   Temp:  [98.3 °F (36.8 °C)-99 °F (37.2 °C)] 98.4 °F (36.9 °C)  Heart Rate:  [78-99] 87  Resp:  [16-18] 18  BP: (116-133)/(53-62) 125/54  Physical Exam    Constitutional: Awake, alert, laying in bed   Eyes: Pupils equal, sclerae anicteric, no conjunctival injection   HENT: NCAT, mucous membranes moist   Neck: Supple, no thyromegaly, no lymphadenopathy, trachea midline   Respiratory: Clear to auscultation bilaterally, nonlabored respirations    Cardiovascular: RRR, no murmurs, rubs, or gallops, palpable pedal pulses bilaterally   Gastrointestinal: Positive bowel sounds, soft, nontender, distended   Musculoskeletal: No bilateral ankle edema, no clubbing or cyanosis to extremities   Psychiatric: Appropriate affect, cooperative   Neurologic: Follows commands, no slurred speech, no facial asymmetry   Skin: No rashes, jaundiced    Result Review    Result Review:  I have personally reviewed the results from the time of this admission to 2023 09:25 EDT and agree with these findings:  []  Laboratory  []  Microbiology  []  Radiology  []  EKG/Telemetry   []  Cardiology/Vascular    []  Pathology  []  Old records  []  Other:    Assessment & Plan   Assessment / Plan     Assessment:  Acute decompensated alcoholic cirrhosis  Concern for SBP  Pseudohyponatremia secondary to hypervolemia  Concern for pericardial effusion  Anemia of chronic disease  Hypokalemia  Thrombocytopenia secondary to alcoholic cirrhosis  Supratherapeutic INR due to underlying hepatic disease  Hypoalbuminemia    Plan:  Continue to monitor as inpatient  Gastroenterology assistance appreciated this patient  Paracentesis fluid reviewed, no SBP, will start methylprednisolone 40 mg daily  Added Protonix for GI prophylaxis  INR reviewed and is stable  Replenish potassium, continue to monitor  Continue with CIWA protocol given history of EtOH abuse, benzodiazepines in place as needed, will avoid use as much as possible  Continue with fluid restriction, continue monitor I's and O's, daily weights  Echocardiogram with ejection fraction 56-60%, no significant valvular disease  Blood count remained stable, continue to monitor, recheck CBC in a.m.  CBC, CMP, exam, phosphorus reviewed, recheck in follow-up in a.m.  Continue nicotine patch, patient counseled importance of smoking cessation record  Further recommendations pending clinical course      Discussed plan with gastroenterology, RN.    DVT prophylaxis:  Mechanical DVT prophylaxis orders are present.    CODE STATUS:   Code Status (Patient has no pulse and is not breathing): CPR (Attempt to Resuscitate)  Medical Interventions (Patient has pulse or is breathing): Full Support  Release to patient: Routine Release        Electronically signed by Gonzalo Orozco DO, 07/21/23, 9:25 AM EDT.

## 2023-07-22 VITALS
TEMPERATURE: 98.1 F | BODY MASS INDEX: 28.57 KG/M2 | HEIGHT: 69 IN | OXYGEN SATURATION: 97 % | HEART RATE: 86 BPM | SYSTOLIC BLOOD PRESSURE: 123 MMHG | DIASTOLIC BLOOD PRESSURE: 57 MMHG | RESPIRATION RATE: 18 BRPM | WEIGHT: 192.9 LBS

## 2023-07-22 LAB
ALBUMIN SERPL-MCNC: 2.5 G/DL (ref 3.5–5.2)
ALBUMIN/GLOB SERPL: 0.6 G/DL
ALP SERPL-CCNC: 160 U/L (ref 39–117)
ALT SERPL W P-5'-P-CCNC: 39 U/L (ref 1–41)
ANION GAP SERPL CALCULATED.3IONS-SCNC: 6.8 MMOL/L (ref 5–15)
ANISOCYTOSIS BLD QL: NORMAL
AST SERPL-CCNC: 90 U/L (ref 1–40)
BASOPHILS # BLD AUTO: 0.01 10*3/MM3 (ref 0–0.2)
BASOPHILS NFR BLD AUTO: 0.2 % (ref 0–1.5)
BILIRUB SERPL-MCNC: 15.4 MG/DL (ref 0–1.2)
BUN SERPL-MCNC: 9 MG/DL (ref 6–20)
BUN/CREAT SERPL: 14.1 (ref 7–25)
CALCIUM SPEC-SCNC: 8.2 MG/DL (ref 8.6–10.5)
CHLORIDE SERPL-SCNC: 89 MMOL/L (ref 98–107)
CO2 SERPL-SCNC: 31.2 MMOL/L (ref 22–29)
CREAT SERPL-MCNC: 0.64 MG/DL (ref 0.76–1.27)
DEPRECATED RDW RBC AUTO: 59.4 FL (ref 37–54)
EGFRCR SERPLBLD CKD-EPI 2021: 123.5 ML/MIN/1.73
EOSINOPHIL # BLD AUTO: 0 10*3/MM3 (ref 0–0.4)
EOSINOPHIL NFR BLD AUTO: 0 % (ref 0.3–6.2)
ERYTHROCYTE [DISTWIDTH] IN BLOOD BY AUTOMATED COUNT: 16 % (ref 12.3–15.4)
GLOBULIN UR ELPH-MCNC: 4.3 GM/DL
GLUCOSE SERPL-MCNC: 137 MG/DL (ref 65–99)
HCT VFR BLD AUTO: 30.7 % (ref 37.5–51)
HGB BLD-MCNC: 10.7 G/DL (ref 13–17.7)
IMM GRANULOCYTES # BLD AUTO: 0.05 10*3/MM3 (ref 0–0.05)
IMM GRANULOCYTES NFR BLD AUTO: 0.8 % (ref 0–0.5)
LYMPHOCYTES # BLD AUTO: 0.27 10*3/MM3 (ref 0.7–3.1)
LYMPHOCYTES NFR BLD AUTO: 4.4 % (ref 19.6–45.3)
MAGNESIUM SERPL-MCNC: 2 MG/DL (ref 1.6–2.6)
MCH RBC QN AUTO: 35.1 PG (ref 26.6–33)
MCHC RBC AUTO-ENTMCNC: 34.9 G/DL (ref 31.5–35.7)
MCV RBC AUTO: 100.7 FL (ref 79–97)
MONOCYTES # BLD AUTO: 0.41 10*3/MM3 (ref 0.1–0.9)
MONOCYTES NFR BLD AUTO: 6.7 % (ref 5–12)
NEUTROPHILS NFR BLD AUTO: 5.34 10*3/MM3 (ref 1.7–7)
NEUTROPHILS NFR BLD AUTO: 87.9 % (ref 42.7–76)
NRBC BLD AUTO-RTO: 0 /100 WBC (ref 0–0.2)
PHOSPHATE SERPL-MCNC: 2.2 MG/DL (ref 2.5–4.5)
PLATELET # BLD AUTO: 71 10*3/MM3 (ref 140–450)
PMV BLD AUTO: 10.5 FL (ref 6–12)
POTASSIUM SERPL-SCNC: 3.3 MMOL/L (ref 3.5–5.2)
PROT SERPL-MCNC: 6.8 G/DL (ref 6–8.5)
RBC # BLD AUTO: 3.05 10*6/MM3 (ref 4.14–5.8)
SMALL PLATELETS BLD QL SMEAR: NORMAL
SODIUM SERPL-SCNC: 127 MMOL/L (ref 136–145)
TOXIC GRANULATION: NORMAL
WBC NRBC COR # BLD: 6.08 10*3/MM3 (ref 3.4–10.8)

## 2023-07-22 PROCEDURE — 99239 HOSP IP/OBS DSCHRG MGMT >30: CPT | Performed by: STUDENT IN AN ORGANIZED HEALTH CARE EDUCATION/TRAINING PROGRAM

## 2023-07-22 PROCEDURE — 80053 COMPREHEN METABOLIC PANEL: CPT | Performed by: STUDENT IN AN ORGANIZED HEALTH CARE EDUCATION/TRAINING PROGRAM

## 2023-07-22 PROCEDURE — 85007 BL SMEAR W/DIFF WBC COUNT: CPT | Performed by: STUDENT IN AN ORGANIZED HEALTH CARE EDUCATION/TRAINING PROGRAM

## 2023-07-22 PROCEDURE — 85025 COMPLETE CBC W/AUTO DIFF WBC: CPT | Performed by: STUDENT IN AN ORGANIZED HEALTH CARE EDUCATION/TRAINING PROGRAM

## 2023-07-22 PROCEDURE — 0 POTASSIUM CHLORIDE 10 MEQ/100ML SOLUTION: Performed by: STUDENT IN AN ORGANIZED HEALTH CARE EDUCATION/TRAINING PROGRAM

## 2023-07-22 PROCEDURE — 83735 ASSAY OF MAGNESIUM: CPT | Performed by: STUDENT IN AN ORGANIZED HEALTH CARE EDUCATION/TRAINING PROGRAM

## 2023-07-22 PROCEDURE — 84100 ASSAY OF PHOSPHORUS: CPT | Performed by: STUDENT IN AN ORGANIZED HEALTH CARE EDUCATION/TRAINING PROGRAM

## 2023-07-22 PROCEDURE — 25010000002 THIAMINE PER 100 MG: Performed by: STUDENT IN AN ORGANIZED HEALTH CARE EDUCATION/TRAINING PROGRAM

## 2023-07-22 PROCEDURE — 63710000001 METHYLPREDNISOLONE PER 4 MG: Performed by: STUDENT IN AN ORGANIZED HEALTH CARE EDUCATION/TRAINING PROGRAM

## 2023-07-22 PROCEDURE — 25010000002 FUROSEMIDE PER 20 MG: Performed by: STUDENT IN AN ORGANIZED HEALTH CARE EDUCATION/TRAINING PROGRAM

## 2023-07-22 RX ORDER — METHYLPREDNISOLONE 8 MG/1
40 TABLET ORAL
Qty: 70 TABLET | Refills: 0 | Status: SHIPPED | OUTPATIENT
Start: 2023-07-22 | End: 2023-07-22 | Stop reason: SDUPTHER

## 2023-07-22 RX ORDER — LACTULOSE 10 G/15ML
10 SOLUTION ORAL 3 TIMES DAILY
Qty: 1350 ML | Refills: 0 | Status: ON HOLD | OUTPATIENT
Start: 2023-07-22 | End: 2023-08-21

## 2023-07-22 RX ORDER — PANTOPRAZOLE SODIUM 40 MG/1
40 TABLET, DELAYED RELEASE ORAL
Qty: 30 TABLET | Refills: 0 | Status: ON HOLD | OUTPATIENT
Start: 2023-07-23 | End: 2023-08-22

## 2023-07-22 RX ORDER — METHYLPREDNISOLONE 8 MG/1
40 TABLET ORAL
Qty: 70 TABLET | Refills: 0 | Status: SHIPPED | OUTPATIENT
Start: 2023-07-22 | End: 2023-08-05

## 2023-07-22 RX ORDER — POTASSIUM CHLORIDE 7.45 MG/ML
10 INJECTION INTRAVENOUS
Status: COMPLETED | OUTPATIENT
Start: 2023-07-22 | End: 2023-07-22

## 2023-07-22 RX ORDER — FUROSEMIDE 40 MG/1
40 TABLET ORAL DAILY
Qty: 30 TABLET | Refills: 0 | Status: SHIPPED | OUTPATIENT
Start: 2023-07-22 | End: 2023-07-31 | Stop reason: SDUPTHER

## 2023-07-22 RX ORDER — FOLIC ACID 1 MG/1
1 TABLET ORAL DAILY
Qty: 30 TABLET | Refills: 0 | Status: ON HOLD | OUTPATIENT
Start: 2023-07-22 | End: 2023-08-21

## 2023-07-22 RX ORDER — LANOLIN ALCOHOL/MO/W.PET/CERES
100 CREAM (GRAM) TOPICAL DAILY
Qty: 30 TABLET | Refills: 0 | Status: ON HOLD | OUTPATIENT
Start: 2023-07-24 | End: 2023-08-23

## 2023-07-22 RX ORDER — NICOTINE 21 MG/24HR
1 PATCH, TRANSDERMAL 24 HOURS TRANSDERMAL
Qty: 30 PATCH | Refills: 0 | Status: SHIPPED | OUTPATIENT
Start: 2023-07-22 | End: 2023-08-07

## 2023-07-22 RX ADMIN — PANTOPRAZOLE SODIUM 40 MG: 40 TABLET, DELAYED RELEASE ORAL at 06:25

## 2023-07-22 RX ADMIN — LACTULOSE 10 G: 20 SOLUTION ORAL at 08:14

## 2023-07-22 RX ADMIN — METHYLPREDNISOLONE 40 MG: 4 TABLET ORAL at 09:29

## 2023-07-22 RX ADMIN — SPIRONOLACTONE 25 MG: 25 TABLET ORAL at 08:14

## 2023-07-22 RX ADMIN — POTASSIUM CHLORIDE 10 MEQ: 7.46 INJECTION, SOLUTION INTRAVENOUS at 09:17

## 2023-07-22 RX ADMIN — POTASSIUM & SODIUM PHOSPHATES POWDER PACK 280-160-250 MG 1 PACKET: 280-160-250 PACK at 08:13

## 2023-07-22 RX ADMIN — Medication 1 MG: at 08:13

## 2023-07-22 RX ADMIN — RIFAXIMIN 550 MG: 550 TABLET ORAL at 08:13

## 2023-07-22 RX ADMIN — POTASSIUM CHLORIDE 40 MEQ: 750 CAPSULE, EXTENDED RELEASE ORAL at 08:13

## 2023-07-22 RX ADMIN — POTASSIUM CHLORIDE 10 MEQ: 7.46 INJECTION, SOLUTION INTRAVENOUS at 08:13

## 2023-07-22 RX ADMIN — POTASSIUM CHLORIDE 10 MEQ: 7.46 INJECTION, SOLUTION INTRAVENOUS at 11:36

## 2023-07-22 RX ADMIN — POTASSIUM CHLORIDE 10 MEQ: 7.46 INJECTION, SOLUTION INTRAVENOUS at 10:35

## 2023-07-22 RX ADMIN — FUROSEMIDE 40 MG: 10 INJECTION, SOLUTION INTRAMUSCULAR; INTRAVENOUS at 08:14

## 2023-07-22 RX ADMIN — THIAMINE HYDROCHLORIDE 200 MG: 100 INJECTION, SOLUTION INTRAMUSCULAR; INTRAVENOUS at 06:25

## 2023-07-22 RX ADMIN — Medication 10 ML: at 08:15

## 2023-07-22 NOTE — DISCHARGE INSTR - LAB
Follow up with Dr. Childress in 1-2 weeks   [phone] [address] [facility] [place]

## 2023-07-22 NOTE — DISCHARGE SUMMARY
[facility]         HOSPITALIST  DISCHARGE SUMMARY    Patient Name: Colin Rowland  : 1985  MRN: [id number]    Date of Admission: 2023  Date of Discharge: 2023  Primary Care Physician: Andrea Sanders PA-C    Consults       Date and Time Order Name Status Description    2023  9:20 PM General MD Inpatient Consult Completed             Active and Resolved Hospital Problems:  Active Hospital Problems    Diagnosis POA    **Liver failure [K72.90] Yes      Resolved Hospital Problems   No resolved problems to display.       Hospital Course     HPI Per hospital record:  38-year-old male with history of EtOH abuse and alcoholic cirrhosis who presents with a chief complaint of abdominal distention. Patient notes that his last drink was around 1 week ago. Patient has been admitted and gastroenterology have been consulted. Patient started on Rocephin for SBP prophylaxis.     Ultimately patient was admitted and underwent paracentesis which revealed no SBP.  Rocephin was subsequently discontinued.  He was kept on fluid restriction and received intravenous Lasix with appropriate response.  Gastroenterology evaluated the patient and it was recommended he be started on prednisolone 40 mg daily.  Patient will be placed on this at discharge along with Protonix for GI prophylaxis.  He will also continue with thiamine folic acid given his history of EtOH abuse.  He will also be given lactulose.  Patient will titrate his bowel movements to 2-3 daily.  He was also given nicotine patch and was counseled importance smoking cessation.  He was discharged stable condition.  He will follow-up with his PCP 1 week.  He will follow-up with gastroenterology per their recommendations.        DISCHARGE Follow Up Recommendations for labs and diagnostics: PCP 1 week.  Gastroenterology as recommended.      Day of Discharge     Vital Signs:  Temp:  [98.1 °F (36.7 °C)-99 °F (37.2 °C)] 98.1 °F (36.7 °C)  Heart  Rate:  [78-89] 86  Resp:  [16-18] 18  BP: (121-131)/(57-65) 123/57  Physical Exam:   Constitutional: Alert, awake, laying in bed  HEENT:  PERRLA, EOMI; slight scleral icterus  Neck:  Supple; No Mass, No Lymphadenopathy  Cardiovascular:  No Rubs, No Edema, No JVD  Respiratory: No respiratory distress, unlabored breathing, saturating well on room air  Abdomen:  Normal BS all 4 Quadrants; No Guarding, No Tenderness  Musculoskeletal:  Pulses Positive all 4 Ext; No Cyanosis, No Edema  Neurological:  Alert+Ox3, Mental status WNL; No Dysarthria  Skin:  No Rash, No Cellulitis, jaundice      Discharge Details        Discharge Medications        New Medications        Instructions Start Date   folic acid 1 MG tablet  Commonly known as: FOLVITE   1 mg, Oral, Daily      furosemide 40 MG tablet  Commonly known as: Lasix   40 mg, Oral, Daily      lactulose 10 GM/15ML solution  Commonly known as: CHRONULAC   10 g, Oral, 3 Times Daily      methylPREDNISolone 8 MG tablet  Commonly known as: MEDROL   40 mg, Oral, Daily With Breakfast      nicotine 21 MG/24HR patch  Commonly known as: NICODERM CQ   1 patch, Transdermal, Every 24 Hours Scheduled      pantoprazole 40 MG EC tablet  Commonly known as: PROTONIX   40 mg, Oral, Every Early Morning   Start Date: July 23, 2023     thiamine 100 MG tablet  Commonly known as: VITAMIN B1   100 mg, Oral, Daily   Start Date: July 24, 2023            Continue These Medications        Instructions Start Date   spironolactone 25 MG tablet  Commonly known as: ALDACTONE   25 mg, Oral, Daily               No Known Allergies    Discharge Disposition:  Home or Self Care    Diet:  Hospital:No active diet order      Discharge Activity:       CODE STATUS:  Code Status and Medical Interventions:   Ordered at: 07/19/23 0020     Code Status (Patient has no pulse and is not breathing):    CPR (Attempt to Resuscitate)     Medical Interventions (Patient has pulse or is breathing):    Full Support     Release to  patient:    Routine Release         No future appointments.    Additional Instructions for the Follow-ups that You Need to Schedule       Discharge Follow-up with PCP   As directed       Currently Documented PCP:    Andrea Sanders PA-C    PCP Phone Number:    [phone]     Follow Up Details: 1 wk         Discharge Follow-up with Specified Provider: Gastroenterology 1-2 wks, Dr. Childress.   As directed      To: Gastroenterology 1-2 wks, Dr. Childress.        Additional information on Labs and Follow-ups:    Follow up with Dr. Childress in 1-2 weeks   [phone] [address] [place], [place]           Pertinent  and/or Most Recent Results     PROCEDURES:   Adult Transthoracic Echo Complete W/ Cont if Necessary Per Protocol    Result Date: 7/19/2023  Narrative:   Left ventricular ejection fraction appears to be 56 - 60%.   The left ventricular cavity is mildly dilated.   Left ventricular diastolic function was normal.   Technically difficult study.   No significant valvular disease.     US Liver    Result Date: 7/20/2023  Narrative: PROCEDURE: US LIVER  COMPARISON: None  INDICATIONS: jaundice  FINDINGS:  Liver:  Ascites surrounds a heterogeneous nodular appearing liver with increased echogenicity compatible with cirrhosis.  No discrete lesion noted on limited imaging.  Portal vein and hepatic veins are patent.  Biliary:  There is diffuse wall thickening of the wall of the gallbladder measuring up to 8 mm nonspecific in setting of underlying liver dysfunction.  Numerous stones and sludge are noted internally.  Common bile duct measures 4.4 mm.  Pancreas:  Obscured by bowel gas  Right kidney:  Right kidney is normal in appearance.    Other:  Moderate to large amount of ascites       Impression:   1. Findings compatible with cirrhosis.  No discrete lesion noted given limitations of study 2. Moderate to large amount of ascites 3. Cholelithiasis.  Gallbladder wall thickening is favored to be secondary to underlying liver  "dysfunction and 3rd spacing.  If there is clinical concern for biliary dysfunction, cholecystitis and further evaluation with a nuclear medicine hepatic biliary scan could always be considered      AYAAN COHN MD       Electronically Signed and Approved By: AYAAN COHN MD on 7/20/2023 at 11:08             XR Chest 1 View    Result Date: 7/18/2023  Narrative: PROCEDURE: XR CHEST 1 VW  COMPARISON: None.  INDICATIONS: cough  FINDINGS: A single frontal (AP or PA upright portable) chest radiograph reveals no cardiac enlargement and no acute infiltrate. No pneumothorax is seen.  Mild subsegmental atelectasis may involve the lung bases.  External artifacts obscure detail.  Chronic calcified granulomatous disease involves the chest.       Impression:  No acute infiltrate is appreciated.    Please note that portions of this note were completed with a voice recognition program.  IMAN STEWART JR, MD       Electronically Signed and Approved By: IMAN STEWART JR, MD on 7/18/2023 at 21:24              US Paracentesis    Result Date: 7/20/2023  Narrative: PROCEDURE: US PARACENTESIS  COMPARISON: None  INDICATIONS: Acites, concern for SBP. 800 ML CLEAR MARKS ASCITES FLUID REMOVED. 125 ML COLLECTED AND SENT TO LAB.  CONSENT: Risks and benefits were discussed with the patient including but not limited to risk of bleeding, infection, bowel injury and/or injury to adjacent structures. Alternatives were discussed with the patient. The patient verbalized understanding and elected to proceed with the procedure.  TECHNIQUE/FINDINGS:  Preprocedural vitals obtained and reviewed. Preliminary bedside ultrasound demonstrated a large amount of ascitic fluid in the right lower quadrant, site was marked with an \"arrow\".  The overlying skin was prepped and draped in sterile fashion.  The skin was infiltrated with local anesthesia over the insertion site with 11 mL of 2% lidocaine to anesthetize the site.  A scalpel was used to make a " small nick in the overlying skin to aid in catheter advancement.  A 5 Portuguese 7 cm Yueh catheter was then advanced into peritoneal space.  800 mL of clear, ma ascitic fluid was aspirated.  Afterwards, the catheter was then removed and a bandage was applied. A sample of the fluid specimen was sent to lab for further diagnostic evaluation.  Patient was transferred back to their hospital room status post procedure and report was given to patient's nurse via secure epic Chat via tech.      Impression:  Ultrasound-guided paracentesis was performed without complication, patient tolerated procedure with 800 ml of clear, ma ascitic fluid removed. A sample specimen of 125 ml was sent to the lab for further diagnostic evaluation.  The patient was instructed to obtain follow up care from the referring physician.    FLAKO LUQUE       Electronically Signed and Approved By: Neville Ramos M.D. on 7/20/2023 at 16:54                LAB RESULTS:      Lab 07/22/23  0451 07/21/23  0448 07/20/23  0432 07/20/23  0431 07/19/23  0851 07/19/23  0501 07/19/23  0110 07/18/23  1934 07/18/23  1756   WBC 6.08 5.97  --  5.54 6.52  --   --   --  6.57   HEMOGLOBIN 10.7* 10.3*  --  11.0* 10.8*  --   --   --  12.1*   HEMATOCRIT 30.7* 29.0*  --  30.9* 30.3*  --   --   --  33.4*   PLATELETS 71* 74*  --  74* 87*  --   --   --  98*   NEUTROS ABS 5.34 3.97  --  3.90 4.69  --   --   --  4.93   IMMATURE GRANS (ABS) 0.05 0.04  --  0.04 0.04  --   --   --  0.04   LYMPHS ABS 0.27* 0.51*  --  0.42* 0.39*  --   --   --  0.36*   MONOS ABS 0.41 1.29*  --  1.08* 1.31*  --   --   --  1.14*   EOS ABS 0.00 0.13  --  0.08 0.06  --   --   --  0.07   .7* 99.0*  --  97.8* 97.4*  --   --   --  94.6   LACTATE  --   --   --   --   --  1.8 2.2*  --   --    LACTATE, ARTERIAL  --   --   --   --   --   --   --  1.34  --    PROTIME  --   --  29.3*  --   --  39.3* 36.4*  --  35.6*   APTT  --   --   --   --   --   --   --   --  46.0*         Lab 07/22/23  0451  07/21/23 0448 07/20/23  0432 07/19/23  0501 07/19/23  0110 07/18/23  1934 07/18/23  1756   SODIUM 127* 130* 127* 124* 121*  --  119*   SODIUM, VENOUS  --   --   --   --   --  118.2*  --    POTASSIUM 3.3* 2.7* 2.7* 2.8* 3.3*  --  3.3*   POTASSIUM, VENOUS  --   --   --   --   --  3.4*  --    CHLORIDE 89* 90* 88* 86* 85*  --  83*   CHLORIDE, VENOUS  --   --   --   --   --  86*  --    CO2 31.2* 32.8* 30.3* 30.8* 24.2  --  27.9   ANION GAP 6.8 7.2 8.7 7.2 11.8  --  8.1   BUN 9 8 9 10 11  --  11   CREATININE 0.64* 0.66* 0.77 0.80 0.66*  --  0.78   EGFR 123.5 122.4 116.8 115.5 122.4  --  116.3   GLUCOSE 137* 100* 91 112* 122*  --  130*   GLUCOSE, ARTERIAL  --   --   --   --   --  98  --    CALCIUM 8.2* 8.2* 8.0* 8.0* 8.2*  --  8.2*   IONIZED CALCIUM  --   --   --   --   --  0.99*  --    MAGNESIUM 2.0 1.6 1.6 1.6  --   --  1.9   PHOSPHORUS 2.2*  --  2.8 3.2  --   --   --          Lab 07/22/23  0451 07/21/23 0448 07/20/23  0432 07/19/23  0501 07/18/23  1756   TOTAL PROTEIN 6.8 6.2 6.4 5.6* 6.6   ALBUMIN 2.5* 2.5* 2.4* 2.1* 2.4*   GLOBULIN 4.3 3.7 4.0  --  4.2   ALT (SGPT) 39 35 37 33 39   AST (SGOT) 90* 86* 92* 88* 97*   BILIRUBIN 15.4* 16.4* 18.4* 19.9* 21.7*   BILIRUBIN DIRECT  --   --   --  >10.0*  --    ALK PHOS 160* 141* 144* 105 128*   LIPASE  --   --   --   --  86*         Lab 07/20/23  0432 07/19/23  0501 07/19/23  0110 07/18/23  1756   PROTIME 29.3* 39.3* 36.4* 35.6*   INR 2.83* 4.18* 3.77* 3.67*             Lab 07/19/23  0856   ABO TYPING A   RH TYPING Positive         Lab 07/18/23  1934   CARBOXYHEMOGLOBIN 4.9*     Brief Urine Lab Results  (Last result in the past 365 days)        Color   Clarity   Blood   Leuk Est   Nitrite   Protein   CREAT   Urine HCG        07/19/23 0848 Dark Yellow   Cloudy   Negative   Trace   Positive   Negative                 Microbiology Results (last 10 days)       Procedure Component Value - Date/Time    Body Fluid Culture - Body Fluid, Peritoneum [[id number]] Collected: 07/20/23 0650     Lab Status: Preliminary result Specimen: Body Fluid from Peritoneum Updated: 07/22/23 1026     Body Fluid Culture No growth at 2 days     Gram Stain Few (2+) WBCs seen      No organisms seen    Blood Culture - Blood, Arm, Left [[id number]]  (Normal) Collected: 07/19/23 0110    Lab Status: Preliminary result Specimen: Blood from Arm, Left Updated: 07/22/23 0130     Blood Culture No growth at 3 days    Blood Culture - Blood, Arm, Left [[id number]]  (Normal) Collected: 07/19/23 0110    Lab Status: Preliminary result Specimen: Blood from Arm, Left Updated: 07/22/23 0130     Blood Culture No growth at 3 days            US Liver    Result Date: 7/20/2023  Impression:   1. Findings compatible with cirrhosis.  No discrete lesion noted given limitations of study 2. Moderate to large amount of ascites 3. Cholelithiasis.  Gallbladder wall thickening is favored to be secondary to underlying liver dysfunction and 3rd spacing.  If there is clinical concern for biliary dysfunction, cholecystitis and further evaluation with a nuclear medicine hepatic biliary scan could always be considered      AYAAN COHN MD       Electronically Signed and Approved By: AYAAN COHN MD on 7/20/2023 at 11:08             XR Chest 1 View    Result Date: 7/18/2023  Impression:  No acute infiltrate is appreciated.    Please note that portions of this note were completed with a voice recognition program.  IMAN STEWART JR, MD       Electronically Signed and Approved By: IMAN STEWART JR, MD on 7/18/2023 at 21:24              US Paracentesis    Result Date: 7/20/2023  Impression:  Ultrasound-guided paracentesis was performed without complication, patient tolerated procedure with 800 ml of clear, ma ascitic fluid removed. A sample specimen of 125 ml was sent to the lab for further diagnostic evaluation.  The patient was instructed to obtain follow up care from the referring physician.    FLAKO LUQUE       Electronically Signed and  Approved By: Neville Ramos M.D. on 7/20/2023 at 16:54                      Results for orders placed during the hospital encounter of 07/18/23    Adult Transthoracic Echo Complete W/ Cont if Necessary Per Protocol    Interpretation Summary    Left ventricular ejection fraction appears to be 56 - 60%.    The left ventricular cavity is mildly dilated.    Left ventricular diastolic function was normal.    Technically difficult study.    No significant valvular disease.      Labs Pending at Discharge:  Pending Labs       Order Current Status    Anaerobic Culture - Body Fluid, Peritoneum In process    Bilirubin, Body Fluid - Body Fluid, Peritoneum In process    Blood Culture - Blood, Arm, Left Preliminary result    Blood Culture - Blood, Arm, Left Preliminary result    Body Fluid Culture - Body Fluid, Peritoneum Preliminary result              Time spent on Discharge including face to face service:  32 minutes    Electronically signed by Gonzalo Orozco DO, 07/22/23, 4:18 PM EDT.

## 2023-07-23 LAB
BACTERIA FLD CULT: NORMAL
BILIRUB FLD-MCNC: 2.5 MG/DL
GRAM STN SPEC: NORMAL
GRAM STN SPEC: NORMAL

## 2023-07-24 ENCOUNTER — TRANSITIONAL CARE MANAGEMENT TELEPHONE ENCOUNTER (OUTPATIENT)
Dept: CALL CENTER | Facility: HOSPITAL | Age: 38
End: 2023-07-24
Payer: COMMERCIAL

## 2023-07-24 LAB
BACTERIA SPEC AEROBE CULT: NORMAL
BACTERIA SPEC AEROBE CULT: NORMAL

## 2023-07-24 NOTE — OUTREACH NOTE
Call Center TCM Note      Flowsheet Row Responses   [facility] patient discharged from? [facility]   Does the patient have one of the following disease processes/diagnoses(primary or secondary)? Other   TCM attempt successful? Yes   Call start time 1607   Call end time 1608   Discharge diagnosis Liver failure   Meds reviewed with patient/caregiver? Yes   Is the patient having any side effects they believe may be caused by any medication additions or changes? No   Does the patient have all medications ordered at discharge? Yes   Is the patient taking all medications as directed (includes completed medication regime)? Yes   Does the patient have an appointment with their PCP within 7-14 days of discharge? No appointments available   Nursing Interventions Routed TCM call to PCP office, PCP office requested to make appointment - message sent   Has home health visited the patient within 72 hours of discharge? N/A   Psychosocial issues? No   Did the patient receive a copy of their discharge instructions? Yes   Nursing interventions Reviewed instructions with patient   What is the patient's perception of their health status since discharge? Improving   Is the patient/caregiver able to teach back signs and symptoms related to disease process for when to call PCP? Yes   Is the patient/caregiver able to teach back signs and symptoms related to disease process for when to call 911? Yes   Is the patient/caregiver able to teach back the hierarchy of who to call/visit for symptoms/problems? PCP, Specialist, Home health nurse, Urgent Care, ED, 911 Yes   TCM call completed? Yes   Wrap up additional comments D/C DX:Liver failure - Pt feeling better at this time. All new medicaitons in place. No questions. GI MD follow up is 07/31/2023. PCP Andrea Sanders has no available times I could access to sched TCM APPT.   Call end time 1608            Kate Mendieta MA    7/24/2023, 16:10 EDT

## 2023-07-24 NOTE — OUTREACH NOTE
Call Center TCM Note      Flowsheet Row Responses   [facility] patient discharged from? [facility]   Does the patient have one of the following disease processes/diagnoses(primary or secondary)? Other   TCM attempt successful? No   Unsuccessful attempts Attempt 1            Kate Mendieta MA    7/24/2023, 15:31 EDT

## 2023-07-25 LAB — BACTERIA SPEC ANAEROBE CULT: NORMAL

## 2023-07-31 ENCOUNTER — LAB (OUTPATIENT)
Dept: LAB | Facility: HOSPITAL | Age: 38
End: 2023-07-31
Payer: COMMERCIAL

## 2023-07-31 ENCOUNTER — OFFICE VISIT (OUTPATIENT)
Dept: GASTROENTEROLOGY | Facility: CLINIC | Age: 38
End: 2023-07-31
Payer: COMMERCIAL

## 2023-07-31 ENCOUNTER — READMISSION MANAGEMENT (OUTPATIENT)
Dept: CALL CENTER | Facility: HOSPITAL | Age: 38
End: 2023-07-31
Payer: COMMERCIAL

## 2023-07-31 VITALS
WEIGHT: 209.6 LBS | BODY MASS INDEX: 31.04 KG/M2 | HEART RATE: 105 BPM | DIASTOLIC BLOOD PRESSURE: 80 MMHG | SYSTOLIC BLOOD PRESSURE: 140 MMHG | HEIGHT: 69 IN

## 2023-07-31 DIAGNOSIS — K70.31 ALCOHOLIC CIRRHOSIS OF LIVER WITH ASCITES: ICD-10-CM

## 2023-07-31 DIAGNOSIS — R79.89 ELEVATED LFTS: ICD-10-CM

## 2023-07-31 DIAGNOSIS — K72.00 ACUTE LIVER FAILURE WITHOUT HEPATIC COMA: ICD-10-CM

## 2023-07-31 DIAGNOSIS — K76.0 FATTY LIVER: ICD-10-CM

## 2023-07-31 DIAGNOSIS — R79.89 ELEVATED LFTS: Primary | ICD-10-CM

## 2023-07-31 PROBLEM — E87.1 HYPONATREMIA: Status: ACTIVE | Noted: 2019-02-20

## 2023-07-31 PROBLEM — E80.6 HYPERBILIRUBINEMIA: Status: ACTIVE | Noted: 2017-03-13

## 2023-07-31 PROBLEM — F41.8 MIXED ANXIETY DEPRESSIVE DISORDER: Status: ACTIVE | Noted: 2019-02-20

## 2023-07-31 PROBLEM — D69.6 THROMBOCYTOPENIA: Status: ACTIVE | Noted: 2019-02-20

## 2023-07-31 PROBLEM — R04.0 ANTERIOR EPISTAXIS: Status: ACTIVE | Noted: 2017-02-22

## 2023-07-31 PROBLEM — K70.10 ALCOHOLIC HEPATITIS: Status: ACTIVE | Noted: 2019-02-20

## 2023-07-31 PROBLEM — J01.00 SINUSITIS, ACUTE MAXILLARY: Status: ACTIVE | Noted: 2017-02-22

## 2023-07-31 PROBLEM — R04.0 POSTERIOR EPISTAXIS: Status: ACTIVE | Noted: 2017-02-22

## 2023-07-31 LAB
ALBUMIN SERPL-MCNC: 2.9 G/DL (ref 3.5–5.2)
ALBUMIN/GLOB SERPL: 0.7 G/DL
ALP SERPL-CCNC: 174 U/L (ref 39–117)
ALPHA-FETOPROTEIN: 9.02 NG/ML (ref 0–8.3)
ALT SERPL W P-5'-P-CCNC: 72 U/L (ref 1–41)
ANION GAP SERPL CALCULATED.3IONS-SCNC: 12.5 MMOL/L (ref 5–15)
AST SERPL-CCNC: 75 U/L (ref 1–40)
BILIRUB CONJ SERPL-MCNC: 8 MG/DL (ref 0–0.3)
BILIRUB SERPL-MCNC: 16.9 MG/DL (ref 0–1.2)
BUN SERPL-MCNC: 12 MG/DL (ref 6–20)
BUN/CREAT SERPL: 17.4 (ref 7–25)
CALCIUM SPEC-SCNC: 9 MG/DL (ref 8.6–10.5)
CERULOPLASMIN SERPL-MCNC: 14 MG/DL (ref 16–31)
CHLORIDE SERPL-SCNC: 87 MMOL/L (ref 98–107)
CO2 SERPL-SCNC: 24.5 MMOL/L (ref 22–29)
CREAT SERPL-MCNC: 0.69 MG/DL (ref 0.76–1.27)
EGFRCR SERPLBLD CKD-EPI 2021: 120.7 ML/MIN/1.73
FERRITIN SERPL-MCNC: 182 NG/ML (ref 30–400)
GLOBULIN UR ELPH-MCNC: 4 GM/DL
GLUCOSE SERPL-MCNC: 138 MG/DL (ref 65–99)
HAV IGM SERPL QL IA: NORMAL
HBV CORE IGM SERPL QL IA: NORMAL
HBV SURFACE AG SERPL QL IA: NORMAL
HCV AB SER DONR QL: NORMAL
INR PPP: 2.94 (ref 0.86–1.15)
IRON 24H UR-MRATE: 95 MCG/DL (ref 59–158)
IRON SATN MFR SERPL: 29 % (ref 20–50)
POTASSIUM SERPL-SCNC: 3.8 MMOL/L (ref 3.5–5.2)
PROT SERPL-MCNC: 6.9 G/DL (ref 6–8.5)
PROTHROMBIN TIME: 30.1 SECONDS (ref 11.8–14.9)
SODIUM SERPL-SCNC: 124 MMOL/L (ref 136–145)
TIBC SERPL-MCNC: 326 MCG/DL (ref 298–536)
TRANSFERRIN SERPL-MCNC: 219 MG/DL (ref 200–360)

## 2023-07-31 PROCEDURE — 82525 ASSAY OF COPPER: CPT

## 2023-07-31 PROCEDURE — 82103 ALPHA-1-ANTITRYPSIN TOTAL: CPT

## 2023-07-31 PROCEDURE — 80053 COMPREHEN METABOLIC PANEL: CPT

## 2023-07-31 PROCEDURE — 85027 COMPLETE CBC AUTOMATED: CPT

## 2023-07-31 PROCEDURE — 82104 ALPHA-1-ANTITRYPSIN PHENO: CPT

## 2023-07-31 PROCEDURE — 84165 PROTEIN E-PHORESIS SERUM: CPT

## 2023-07-31 PROCEDURE — 99214 OFFICE O/P EST MOD 30 MIN: CPT | Performed by: NURSE PRACTITIONER

## 2023-07-31 PROCEDURE — 85610 PROTHROMBIN TIME: CPT

## 2023-07-31 PROCEDURE — 82728 ASSAY OF FERRITIN: CPT

## 2023-07-31 PROCEDURE — 86015 ACTIN ANTIBODY EACH: CPT

## 2023-07-31 PROCEDURE — 82105 ALPHA-FETOPROTEIN SERUM: CPT

## 2023-07-31 PROCEDURE — 86381 MITOCHONDRIAL ANTIBODY EACH: CPT

## 2023-07-31 PROCEDURE — 83540 ASSAY OF IRON: CPT

## 2023-07-31 PROCEDURE — 80074 ACUTE HEPATITIS PANEL: CPT

## 2023-07-31 PROCEDURE — 86334 IMMUNOFIX E-PHORESIS SERUM: CPT

## 2023-07-31 PROCEDURE — 84466 ASSAY OF TRANSFERRIN: CPT

## 2023-07-31 PROCEDURE — 86038 ANTINUCLEAR ANTIBODIES: CPT

## 2023-07-31 PROCEDURE — 82784 ASSAY IGA/IGD/IGG/IGM EACH: CPT

## 2023-07-31 PROCEDURE — 36415 COLL VENOUS BLD VENIPUNCTURE: CPT

## 2023-07-31 PROCEDURE — 82248 BILIRUBIN DIRECT: CPT

## 2023-07-31 PROCEDURE — 82390 ASSAY OF CERULOPLASMIN: CPT

## 2023-07-31 RX ORDER — PREDNISONE 10 MG/1
TABLET ORAL
Qty: 70 TABLET | Refills: 0 | Status: ON HOLD | OUTPATIENT
Start: 2023-07-31 | End: 2023-08-28

## 2023-07-31 RX ORDER — FUROSEMIDE 40 MG/1
40 TABLET ORAL DAILY
Qty: 30 TABLET | Refills: 5 | Status: ON HOLD | OUTPATIENT
Start: 2023-07-31

## 2023-07-31 RX ORDER — ALBUMIN (HUMAN) 12.5 G/50ML
25 SOLUTION INTRAVENOUS AS NEEDED
OUTPATIENT
Start: 2023-07-31

## 2023-07-31 RX ORDER — SPIRONOLACTONE 50 MG/1
50 TABLET, FILM COATED ORAL DAILY
Qty: 90 TABLET | Refills: 1 | Status: ON HOLD | OUTPATIENT
Start: 2023-07-31

## 2023-08-01 LAB
DEPRECATED RDW RBC AUTO: 47.3 FL (ref 37–54)
ERYTHROCYTE [DISTWIDTH] IN BLOOD BY AUTOMATED COUNT: 14.2 % (ref 12.3–15.4)
HCT VFR BLD AUTO: 41 % (ref 37.5–51)
HGB BLD-MCNC: 12.2 G/DL (ref 13–17.7)
MCH RBC QN AUTO: 34.5 PG (ref 26.6–33)
MCHC RBC AUTO-ENTMCNC: 30 G/DL (ref 31.5–35.7)
MCV RBC AUTO: 115.8 FL (ref 79–97)
PLATELET # BLD AUTO: 99 10*3/MM3 (ref 140–450)
PMV BLD AUTO: 10.7 FL (ref 6–12)
RBC # BLD AUTO: 3.54 10*6/MM3 (ref 4.14–5.8)
WBC NRBC COR # BLD: 22.55 10*3/MM3 (ref 3.4–10.8)

## 2023-08-02 LAB
ALBUMIN SERPL ELPH-MCNC: 2.7 G/DL (ref 2.9–4.4)
ALBUMIN/GLOB SERPL: 0.7 {RATIO} (ref 0.7–1.7)
ALPHA1 GLOB SERPL ELPH-MCNC: 0.2 G/DL (ref 0–0.4)
ALPHA2 GLOB SERPL ELPH-MCNC: 0.4 G/DL (ref 0.4–1)
ANA SER QL: NEGATIVE
B-GLOBULIN SERPL ELPH-MCNC: 0.8 G/DL (ref 0.7–1.3)
GAMMA GLOB SERPL ELPH-MCNC: 2.8 G/DL (ref 0.4–1.8)
GLOBULIN SER-MCNC: 4.2 G/DL (ref 2.2–3.9)
IGA SERPL-MCNC: 932 MG/DL (ref 90–386)
IGG SERPL-MCNC: 2678 MG/DL (ref 603–1613)
IGM SERPL-MCNC: 424 MG/DL (ref 20–172)
INTERPRETATION SERPL IEP-IMP: ABNORMAL
LABORATORY COMMENT REPORT: ABNORMAL
M PROTEIN SERPL ELPH-MCNC: ABNORMAL G/DL
MITOCHONDRIA M2 IGG SER-ACNC: <20 UNITS (ref 0–20)
PROT SERPL-MCNC: 6.9 G/DL (ref 6–8.5)
SMA IGG SER-ACNC: 33 UNITS (ref 0–19)

## 2023-08-03 LAB — COPPER SERPL-MCNC: 78 UG/DL (ref 69–132)

## 2023-08-04 ENCOUNTER — HOSPITAL ENCOUNTER (OUTPATIENT)
Dept: INTERVENTIONAL RADIOLOGY/VASCULAR | Facility: HOSPITAL | Age: 38
Discharge: HOME | End: 2023-08-04
Payer: COMMERCIAL

## 2023-08-04 ENCOUNTER — LAB (OUTPATIENT)
Dept: LAB | Facility: HOSPITAL | Age: 38
End: 2023-08-04
Payer: COMMERCIAL

## 2023-08-04 VITALS — SYSTOLIC BLOOD PRESSURE: 132 MMHG | HEART RATE: 94 BPM | DIASTOLIC BLOOD PRESSURE: 62 MMHG | OXYGEN SATURATION: 96 %

## 2023-08-04 DIAGNOSIS — K76.0 FATTY LIVER: ICD-10-CM

## 2023-08-04 DIAGNOSIS — K70.31 ALCOHOLIC CIRRHOSIS OF LIVER WITH ASCITES: ICD-10-CM

## 2023-08-04 DIAGNOSIS — R79.89 ELEVATED LFTS: ICD-10-CM

## 2023-08-04 LAB
A1AT PHENOTYP SERPL IFE: NORMAL
A1AT SERPL-MCNC: 156 MG/DL (ref 95–164)
APPEARANCE FLD: CLEAR
APTT PPP: 35.4 SECONDS (ref 24.2–34.2)
COLOR FLD: YELLOW
INR PPP: 3.05 (ref 0.86–1.15)
LYMPHOCYTES NFR FLD MANUAL: 12 %
MACROPHAGE FLUID: 56 %
MESOTHL CELL NFR FLD MANUAL: 2 %
MONOCYTES NFR FLD: 6 %
NEUTROPHILS NFR FLD MANUAL: 24 %
NUC CELL # FLD: 98 /MM3
PLATELET # BLD AUTO: 90 10*3/MM3 (ref 140–450)
PROTHROMBIN TIME: 30.9 SECONDS (ref 11.8–14.9)
RBC # FLD AUTO: <2000 /MM3

## 2023-08-04 PROCEDURE — 25010000002 ALBUMIN HUMAN 25% PER 50 ML: Performed by: NURSE PRACTITIONER

## 2023-08-04 PROCEDURE — 87205 SMEAR GRAM STAIN: CPT | Performed by: NURSE PRACTITIONER

## 2023-08-04 PROCEDURE — 85610 PROTHROMBIN TIME: CPT | Performed by: NURSE PRACTITIONER

## 2023-08-04 PROCEDURE — 85730 THROMBOPLASTIN TIME PARTIAL: CPT | Performed by: NURSE PRACTITIONER

## 2023-08-04 PROCEDURE — 76942 ECHO GUIDE FOR BIOPSY: CPT

## 2023-08-04 PROCEDURE — P9047 ALBUMIN (HUMAN), 25%, 50ML: HCPCS | Performed by: NURSE PRACTITIONER

## 2023-08-04 PROCEDURE — 89051 BODY FLUID CELL COUNT: CPT | Performed by: NURSE PRACTITIONER

## 2023-08-04 PROCEDURE — 85049 AUTOMATED PLATELET COUNT: CPT | Performed by: NURSE PRACTITIONER

## 2023-08-04 PROCEDURE — 87070 CULTURE OTHR SPECIMN AEROBIC: CPT | Performed by: NURSE PRACTITIONER

## 2023-08-04 PROCEDURE — C1729 CATH, DRAINAGE: HCPCS

## 2023-08-04 RX ORDER — ALBUMIN (HUMAN) 12.5 G/50ML
25 SOLUTION INTRAVENOUS ONCE
Status: COMPLETED | OUTPATIENT
Start: 2023-08-04 | End: 2023-08-04

## 2023-08-04 RX ORDER — LIDOCAINE HYDROCHLORIDE 20 MG/ML
10 INJECTION, SOLUTION INFILTRATION; PERINEURAL ONCE
Status: COMPLETED | OUTPATIENT
Start: 2023-08-04 | End: 2023-08-04

## 2023-08-04 RX ORDER — ALBUMIN (HUMAN) 12.5 G/50ML
12.5 SOLUTION INTRAVENOUS ONCE
Status: COMPLETED | OUTPATIENT
Start: 2023-08-04 | End: 2023-08-04

## 2023-08-04 RX ADMIN — ALBUMIN HUMAN 12.5 G: 0.25 SOLUTION INTRAVENOUS at 15:06

## 2023-08-04 RX ADMIN — LIDOCAINE HYDROCHLORIDE 10 ML: 20 INJECTION, SOLUTION INFILTRATION; PERINEURAL at 13:45

## 2023-08-04 RX ADMIN — ALBUMIN HUMAN 25 G: 0.25 SOLUTION INTRAVENOUS at 14:45

## 2023-08-04 RX ADMIN — ALBUMIN HUMAN 25 G: 0.25 SOLUTION INTRAVENOUS at 14:58

## 2023-08-04 RX ADMIN — SODIUM BICARBONATE 1 MEQ: 84 INJECTION, SOLUTION INTRAVENOUS at 13:45

## 2023-08-07 ENCOUNTER — APPOINTMENT (OUTPATIENT)
Dept: GENERAL RADIOLOGY | Facility: HOSPITAL | Age: 38
DRG: 432 | End: 2023-08-07
Payer: COMMERCIAL

## 2023-08-07 ENCOUNTER — HOSPITAL ENCOUNTER (INPATIENT)
Facility: HOSPITAL | Age: 38
LOS: 3 days | Discharge: OTHER INSTITUTION | DRG: 432 | End: 2023-08-10
Attending: EMERGENCY MEDICINE | Admitting: INTERNAL MEDICINE
Payer: COMMERCIAL

## 2023-08-07 DIAGNOSIS — K70.31 ALCOHOLIC CIRRHOSIS OF LIVER WITH ASCITES: ICD-10-CM

## 2023-08-07 DIAGNOSIS — E87.1 HYPONATREMIA: Primary | ICD-10-CM

## 2023-08-07 PROBLEM — E80.6 HYPERBILIRUBINEMIA: Status: ACTIVE | Noted: 2023-08-07

## 2023-08-07 PROBLEM — K74.60 DECOMPENSATED HEPATIC CIRRHOSIS: Status: ACTIVE | Noted: 2023-08-07

## 2023-08-07 PROBLEM — K72.90 DECOMPENSATED HEPATIC CIRRHOSIS: Status: ACTIVE | Noted: 2023-08-07

## 2023-08-07 LAB
ALBUMIN SERPL-MCNC: 3.2 G/DL (ref 3.5–5.2)
ALBUMIN/GLOB SERPL: 1 G/DL
ALP SERPL-CCNC: 129 U/L (ref 39–117)
ALT SERPL W P-5'-P-CCNC: 53 U/L (ref 1–41)
AMMONIA BLD-SCNC: 46 UMOL/L (ref 16–60)
ANION GAP SERPL CALCULATED.3IONS-SCNC: 10 MMOL/L (ref 5–15)
ANION GAP SERPL CALCULATED.3IONS-SCNC: 13.5 MMOL/L (ref 5–15)
ANION GAP SERPL CALCULATED.3IONS-SCNC: 13.7 MMOL/L (ref 5–15)
ANION GAP SERPL CALCULATED.3IONS-SCNC: 9.6 MMOL/L (ref 5–15)
ANISOCYTOSIS BLD QL: NORMAL
AST SERPL-CCNC: 43 U/L (ref 1–40)
BACTERIA FLD CULT: NORMAL
BACTERIA UR QL AUTO: ABNORMAL /HPF
BASOPHILS # BLD AUTO: 0.02 10*3/MM3 (ref 0–0.2)
BASOPHILS NFR BLD AUTO: 0.1 % (ref 0–1.5)
BILIRUB SERPL-MCNC: 23.4 MG/DL (ref 0–1.2)
BILIRUB UR QL STRIP: ABNORMAL
BUN SERPL-MCNC: 16 MG/DL (ref 6–20)
BUN SERPL-MCNC: 16 MG/DL (ref 6–20)
BUN SERPL-MCNC: 18 MG/DL (ref 6–20)
BUN SERPL-MCNC: 19 MG/DL (ref 6–20)
BUN/CREAT SERPL: 22.9 (ref 7–25)
BUN/CREAT SERPL: 23.1 (ref 7–25)
BUN/CREAT SERPL: 24.2 (ref 7–25)
BUN/CREAT SERPL: 25.8 (ref 7–25)
BURR CELLS BLD QL SMEAR: NORMAL
CALCIUM SPEC-SCNC: 8.4 MG/DL (ref 8.6–10.5)
CALCIUM SPEC-SCNC: 8.5 MG/DL (ref 8.6–10.5)
CALCIUM SPEC-SCNC: 8.8 MG/DL (ref 8.6–10.5)
CALCIUM SPEC-SCNC: 8.8 MG/DL (ref 8.6–10.5)
CHLORIDE SERPL-SCNC: 81 MMOL/L (ref 98–107)
CHLORIDE SERPL-SCNC: 81 MMOL/L (ref 98–107)
CHLORIDE SERPL-SCNC: 83 MMOL/L (ref 98–107)
CHLORIDE SERPL-SCNC: 84 MMOL/L (ref 98–107)
CLARITY UR: CLEAR
CO2 SERPL-SCNC: 20.5 MMOL/L (ref 22–29)
CO2 SERPL-SCNC: 23.3 MMOL/L (ref 22–29)
CO2 SERPL-SCNC: 24 MMOL/L (ref 22–29)
CO2 SERPL-SCNC: 24.4 MMOL/L (ref 22–29)
COLOR UR: ABNORMAL
CREAT SERPL-MCNC: 0.62 MG/DL (ref 0.76–1.27)
CREAT SERPL-MCNC: 0.66 MG/DL (ref 0.76–1.27)
CREAT SERPL-MCNC: 0.78 MG/DL (ref 0.76–1.27)
CREAT SERPL-MCNC: 0.83 MG/DL (ref 0.76–1.27)
D-LACTATE SERPL-SCNC: 1.8 MMOL/L (ref 0.5–2)
DEPRECATED RDW RBC AUTO: 57.5 FL (ref 37–54)
EGFRCR SERPLBLD CKD-EPI 2021: 114.2 ML/MIN/1.73
EGFRCR SERPLBLD CKD-EPI 2021: 116.3 ML/MIN/1.73
EGFRCR SERPLBLD CKD-EPI 2021: 122.4 ML/MIN/1.73
EGFRCR SERPLBLD CKD-EPI 2021: 124.7 ML/MIN/1.73
EOSINOPHIL # BLD AUTO: 0.07 10*3/MM3 (ref 0–0.4)
EOSINOPHIL NFR BLD AUTO: 0.4 % (ref 0.3–6.2)
ERYTHROCYTE [DISTWIDTH] IN BLOOD BY AUTOMATED COUNT: 15.1 % (ref 12.3–15.4)
GLOBULIN UR ELPH-MCNC: 3.2 GM/DL
GLUCOSE SERPL-MCNC: 143 MG/DL (ref 65–99)
GLUCOSE SERPL-MCNC: 154 MG/DL (ref 65–99)
GLUCOSE SERPL-MCNC: 164 MG/DL (ref 65–99)
GLUCOSE SERPL-MCNC: 179 MG/DL (ref 65–99)
GLUCOSE UR STRIP-MCNC: NEGATIVE MG/DL
GRAM STN SPEC: NORMAL
HCT VFR BLD AUTO: 32.8 % (ref 37.5–51)
HGB BLD-MCNC: 11.4 G/DL (ref 13–17.7)
HGB UR QL STRIP.AUTO: NEGATIVE
HOLD SPECIMEN: NORMAL
HOLD SPECIMEN: NORMAL
HYALINE CASTS UR QL AUTO: ABNORMAL /LPF
IMM GRANULOCYTES # BLD AUTO: 0.22 10*3/MM3 (ref 0–0.05)
IMM GRANULOCYTES NFR BLD AUTO: 1.3 % (ref 0–0.5)
INR PPP: 2.99 (ref 0.86–1.15)
KETONES UR QL STRIP: NEGATIVE
LEUKOCYTE ESTERASE UR QL STRIP.AUTO: ABNORMAL
LIPASE SERPL-CCNC: 114 U/L (ref 13–60)
LYMPHOCYTES # BLD AUTO: 0.18 10*3/MM3 (ref 0.7–3.1)
LYMPHOCYTES NFR BLD AUTO: 1 % (ref 19.6–45.3)
MACROCYTES BLD QL SMEAR: NORMAL
MCH RBC QN AUTO: 35.4 PG (ref 26.6–33)
MCHC RBC AUTO-ENTMCNC: 34.8 G/DL (ref 31.5–35.7)
MCV RBC AUTO: 101.9 FL (ref 79–97)
MONOCYTES # BLD AUTO: 0.96 10*3/MM3 (ref 0.1–0.9)
MONOCYTES NFR BLD AUTO: 5.5 % (ref 5–12)
NEUTROPHILS NFR BLD AUTO: 16.07 10*3/MM3 (ref 1.7–7)
NEUTROPHILS NFR BLD AUTO: 91.7 % (ref 42.7–76)
NITRITE UR QL STRIP: POSITIVE
NRBC BLD AUTO-RTO: 0 /100 WBC (ref 0–0.2)
NT-PROBNP SERPL-MCNC: 139.3 PG/ML (ref 0–450)
PH UR STRIP.AUTO: 5.5 [PH] (ref 5–8)
PLATELET # BLD AUTO: 66 10*3/MM3 (ref 140–450)
PMV BLD AUTO: 10.5 FL (ref 6–12)
POTASSIUM SERPL-SCNC: 3.5 MMOL/L (ref 3.5–5.2)
POTASSIUM SERPL-SCNC: 3.6 MMOL/L (ref 3.5–5.2)
POTASSIUM SERPL-SCNC: 3.6 MMOL/L (ref 3.5–5.2)
POTASSIUM SERPL-SCNC: 3.8 MMOL/L (ref 3.5–5.2)
PROT SERPL-MCNC: 6.4 G/DL (ref 6–8.5)
PROT UR QL STRIP: NEGATIVE
PROTHROMBIN TIME: 30.5 SECONDS (ref 11.8–14.9)
RBC # BLD AUTO: 3.22 10*6/MM3 (ref 4.14–5.8)
RBC # UR STRIP: ABNORMAL /HPF
REF LAB TEST METHOD: ABNORMAL
SMALL PLATELETS BLD QL SMEAR: NORMAL
SODIUM SERPL-SCNC: 115 MMOL/L (ref 136–145)
SODIUM SERPL-SCNC: 117 MMOL/L (ref 136–145)
SODIUM SERPL-SCNC: 118 MMOL/L (ref 136–145)
SODIUM SERPL-SCNC: 118 MMOL/L (ref 136–145)
SP GR UR STRIP: 1.01 (ref 1–1.03)
SQUAMOUS #/AREA URNS HPF: ABNORMAL /HPF
UROBILINOGEN UR QL STRIP: ABNORMAL
WBC # UR STRIP: ABNORMAL /HPF
WBC MORPH BLD: NORMAL
WBC NRBC COR # BLD: 17.52 10*3/MM3 (ref 3.4–10.8)
WHOLE BLOOD HOLD COAG: NORMAL
WHOLE BLOOD HOLD SPECIMEN: NORMAL

## 2023-08-07 PROCEDURE — 0W9G3ZZ DRAINAGE OF PERITONEAL CAVITY, PERCUTANEOUS APPROACH: ICD-10-PCS | Performed by: INTERNAL MEDICINE

## 2023-08-07 PROCEDURE — 85610 PROTHROMBIN TIME: CPT | Performed by: INTERNAL MEDICINE

## 2023-08-07 PROCEDURE — 74018 RADEX ABDOMEN 1 VIEW: CPT

## 2023-08-07 PROCEDURE — 83930 ASSAY OF BLOOD OSMOLALITY: CPT | Performed by: INTERNAL MEDICINE

## 2023-08-07 PROCEDURE — 25010000002 CEFTRIAXONE PER 250 MG: Performed by: INTERNAL MEDICINE

## 2023-08-07 PROCEDURE — 99285 EMERGENCY DEPT VISIT HI MDM: CPT

## 2023-08-07 PROCEDURE — 82140 ASSAY OF AMMONIA: CPT | Performed by: INTERNAL MEDICINE

## 2023-08-07 PROCEDURE — 25010000002 ENOXAPARIN PER 10 MG: Performed by: INTERNAL MEDICINE

## 2023-08-07 PROCEDURE — 99222 1ST HOSP IP/OBS MODERATE 55: CPT | Performed by: INTERNAL MEDICINE

## 2023-08-07 PROCEDURE — 83880 ASSAY OF NATRIURETIC PEPTIDE: CPT | Performed by: INTERNAL MEDICINE

## 2023-08-07 PROCEDURE — 85007 BL SMEAR W/DIFF WBC COUNT: CPT | Performed by: EMERGENCY MEDICINE

## 2023-08-07 PROCEDURE — 36415 COLL VENOUS BLD VENIPUNCTURE: CPT

## 2023-08-07 PROCEDURE — 63710000001 PREDNISONE PER 1 MG: Performed by: INTERNAL MEDICINE

## 2023-08-07 PROCEDURE — 83690 ASSAY OF LIPASE: CPT

## 2023-08-07 PROCEDURE — 87040 BLOOD CULTURE FOR BACTERIA: CPT | Performed by: INTERNAL MEDICINE

## 2023-08-07 PROCEDURE — 85025 COMPLETE CBC W/AUTO DIFF WBC: CPT | Performed by: EMERGENCY MEDICINE

## 2023-08-07 PROCEDURE — 80053 COMPREHEN METABOLIC PANEL: CPT | Performed by: EMERGENCY MEDICINE

## 2023-08-07 PROCEDURE — 83605 ASSAY OF LACTIC ACID: CPT | Performed by: INTERNAL MEDICINE

## 2023-08-07 PROCEDURE — 81001 URINALYSIS AUTO W/SCOPE: CPT | Performed by: EMERGENCY MEDICINE

## 2023-08-07 PROCEDURE — 87086 URINE CULTURE/COLONY COUNT: CPT

## 2023-08-07 RX ORDER — SODIUM CHLORIDE 0.9 % (FLUSH) 0.9 %
10 SYRINGE (ML) INJECTION AS NEEDED
Status: DISCONTINUED | OUTPATIENT
Start: 2023-08-07 | End: 2023-08-10 | Stop reason: HOSPADM

## 2023-08-07 RX ORDER — METHION/INOS/CHOL BT/B COM/LIV 110MG-86MG
100 CAPSULE ORAL DAILY
Status: DISCONTINUED | OUTPATIENT
Start: 2023-08-07 | End: 2023-08-10 | Stop reason: HOSPADM

## 2023-08-07 RX ORDER — ACETAMINOPHEN 325 MG/1
650 TABLET ORAL EVERY 4 HOURS PRN
Status: DISCONTINUED | OUTPATIENT
Start: 2023-08-07 | End: 2023-08-10 | Stop reason: HOSPADM

## 2023-08-07 RX ORDER — BISACODYL 10 MG
10 SUPPOSITORY, RECTAL RECTAL DAILY PRN
Status: DISCONTINUED | OUTPATIENT
Start: 2023-08-07 | End: 2023-08-07

## 2023-08-07 RX ORDER — NICOTINE 21 MG/24HR
1 PATCH, TRANSDERMAL 24 HOURS TRANSDERMAL
Status: DISCONTINUED | OUTPATIENT
Start: 2023-08-07 | End: 2023-08-10 | Stop reason: HOSPADM

## 2023-08-07 RX ORDER — LACTULOSE 10 G/15ML
10 SOLUTION ORAL DAILY
Status: DISCONTINUED | OUTPATIENT
Start: 2023-08-07 | End: 2023-08-10 | Stop reason: HOSPADM

## 2023-08-07 RX ORDER — SODIUM CHLORIDE 9 MG/ML
40 INJECTION, SOLUTION INTRAVENOUS AS NEEDED
Status: DISCONTINUED | OUTPATIENT
Start: 2023-08-07 | End: 2023-08-10 | Stop reason: HOSPADM

## 2023-08-07 RX ORDER — AMOXICILLIN 250 MG
2 CAPSULE ORAL 2 TIMES DAILY
Status: DISCONTINUED | OUTPATIENT
Start: 2023-08-07 | End: 2023-08-07

## 2023-08-07 RX ORDER — PREDNISONE 20 MG/1
40 TABLET ORAL DAILY
Status: DISCONTINUED | OUTPATIENT
Start: 2023-08-07 | End: 2023-08-08

## 2023-08-07 RX ORDER — POLYETHYLENE GLYCOL 3350 17 G/17G
17 POWDER, FOR SOLUTION ORAL DAILY PRN
Status: DISCONTINUED | OUTPATIENT
Start: 2023-08-07 | End: 2023-08-07

## 2023-08-07 RX ORDER — CHOLECALCIFEROL (VITAMIN D3) 125 MCG
5 CAPSULE ORAL NIGHTLY PRN
Status: DISCONTINUED | OUTPATIENT
Start: 2023-08-07 | End: 2023-08-10 | Stop reason: HOSPADM

## 2023-08-07 RX ORDER — BISACODYL 5 MG/1
5 TABLET, DELAYED RELEASE ORAL DAILY PRN
Status: DISCONTINUED | OUTPATIENT
Start: 2023-08-07 | End: 2023-08-07

## 2023-08-07 RX ORDER — ENOXAPARIN SODIUM 100 MG/ML
40 INJECTION SUBCUTANEOUS DAILY
Status: DISCONTINUED | OUTPATIENT
Start: 2023-08-07 | End: 2023-08-10 | Stop reason: HOSPADM

## 2023-08-07 RX ORDER — PANTOPRAZOLE SODIUM 40 MG/1
40 TABLET, DELAYED RELEASE ORAL
Status: DISCONTINUED | OUTPATIENT
Start: 2023-08-08 | End: 2023-08-10 | Stop reason: HOSPADM

## 2023-08-07 RX ORDER — SODIUM CHLORIDE 9 MG/ML
75 INJECTION, SOLUTION INTRAVENOUS CONTINUOUS
Status: DISCONTINUED | OUTPATIENT
Start: 2023-08-07 | End: 2023-08-08

## 2023-08-07 RX ORDER — CALCIUM CARBONATE 500 MG/1
2 TABLET, CHEWABLE ORAL 2 TIMES DAILY PRN
Status: DISCONTINUED | OUTPATIENT
Start: 2023-08-07 | End: 2023-08-10 | Stop reason: HOSPADM

## 2023-08-07 RX ORDER — SODIUM CHLORIDE 0.9 % (FLUSH) 0.9 %
10 SYRINGE (ML) INJECTION EVERY 12 HOURS SCHEDULED
Status: DISCONTINUED | OUTPATIENT
Start: 2023-08-07 | End: 2023-08-10 | Stop reason: HOSPADM

## 2023-08-07 RX ORDER — ONDANSETRON 2 MG/ML
4 INJECTION INTRAMUSCULAR; INTRAVENOUS EVERY 4 HOURS PRN
Status: DISCONTINUED | OUTPATIENT
Start: 2023-08-07 | End: 2023-08-10 | Stop reason: HOSPADM

## 2023-08-07 RX ORDER — FOLIC ACID 1 MG/1
1 TABLET ORAL DAILY
Status: DISCONTINUED | OUTPATIENT
Start: 2023-08-07 | End: 2023-08-10 | Stop reason: HOSPADM

## 2023-08-07 RX ADMIN — SODIUM CHLORIDE 75 ML/HR: 9 INJECTION, SOLUTION INTRAVENOUS at 16:52

## 2023-08-07 RX ADMIN — Medication 100 MG: at 17:35

## 2023-08-07 RX ADMIN — LACTULOSE 10 G: 20 SOLUTION ORAL at 16:52

## 2023-08-07 RX ADMIN — NICOTINE 1 PATCH: 21 PATCH, EXTENDED RELEASE TRANSDERMAL at 16:53

## 2023-08-07 RX ADMIN — Medication 1 MG: at 16:52

## 2023-08-07 RX ADMIN — CEFTRIAXONE SODIUM 2000 MG: 2 INJECTION, POWDER, FOR SOLUTION INTRAMUSCULAR; INTRAVENOUS at 17:47

## 2023-08-07 RX ADMIN — PREDNISONE 40 MG: 20 TABLET ORAL at 17:35

## 2023-08-07 RX ADMIN — ENOXAPARIN SODIUM 40 MG: 100 INJECTION SUBCUTANEOUS at 16:53

## 2023-08-07 NOTE — ED PROVIDER NOTES
Time: 2:45 PM EDT  Date of encounter:  8/7/2023  Independent Historian/Clinical History and Information was obtained by:   Patient    History is limited by: N/A    Chief Complaint: Abdominal pain      History of Present Illness:  Patient is a 38 y.o. year old male who presents to the emergency department for evaluation of abdominal pain.  Patient states he has a history of cirrhosis and states that he had a paracentesis completed on 8-4-2023.  Patient states since that time he feels like all of his edema has completely returned.  Patient states he was recently admitted to the hospital when he relapsed on his alcohol abuse but states he has not had any alcohol since then.  Patient denies any nausea or vomiting.  Patient does admit to diarrhea but states this is chronic from his medications.  Patient denies any fever.  Patient denies any chest pain.  Patient does state when he exerts himself he feels like he is having shortness of breath from the abdominal swelling.    HPI    Patient Care Team  Primary Care Provider: Andrea Sanders PA-C    Past Medical History:     No Known Allergies  Past Medical History:   Diagnosis Date    Gout     Liver disease      Past Surgical History:   Procedure Laterality Date    MYRINGOTOMY      bilateral    TONSILLECTOMY       Family History   Problem Relation Age of Onset    Diabetes Mother     Hypertension Mother     Hypertension Father     Diabetes Maternal Grandmother     Diabetes Maternal Grandfather     Colon cancer Neg Hx        Home Medications:  Prior to Admission medications    Medication Sig Start Date End Date Taking? Authorizing Provider   folic acid (FOLVITE) 1 MG tablet Take 1 tablet by mouth Daily for 30 days. 7/22/23 8/21/23  Gonzalo Orozco DO   furosemide (Lasix) 40 MG tablet Take 1 tablet by mouth Daily. 7/31/23   Gina Kathleen APRN   lactulose (CHRONULAC) 10 GM/15ML solution Take 15 mL by mouth 3 (Three) Times a Day for 30 days. 7/22/23 8/21/23   "Gonzalo Orozco DO   nicotine (NICODERM CQ) 21 MG/24HR patch Place 1 patch on the skin as directed by provider Daily for 30 days.  Patient not taking: Reported on 7/31/2023 7/22/23 8/21/23  Gonzalo Orozco DO   pantoprazole (PROTONIX) 40 MG EC tablet Take 1 tablet by mouth Every Morning for 30 days. 7/23/23 8/22/23  Gonzalo Orozco DO   predniSONE (DELTASONE) 10 MG tablet Take 4 tablets by mouth Daily for 7 days, THEN 3 tablets Daily for 7 days, THEN 2 tablets Daily for 7 days, THEN 1 tablet Daily for 7 days. 7/31/23 8/28/23  Gina Kathleen APRN   spironolactone (ALDACTONE) 50 MG tablet Take 1 tablet by mouth Daily. 7/31/23   Gina Kathleen APRN   thiamine (VITAMIN B1) 100 MG tablet Take 1 tablet by mouth Daily for 30 days. 7/24/23 8/23/23  Gonzalo Orozco DO        Social History:   Social History     Tobacco Use    Smoking status: Every Day     Packs/day: 1.00     Years: 17.00     Pack years: 17.00     Types: Cigarettes     Start date: 2005    Smokeless tobacco: Never   Vaping Use    Vaping Use: Never used   Substance Use Topics    Alcohol use: Not Currently     Comment: stopped approx 1 week ago from 7/19/23    Drug use: No         Review of Systems:  Review of Systems   Constitutional:  Negative for fever.   Respiratory:  Positive for shortness of breath.    Cardiovascular:  Negative for chest pain.   Gastrointestinal:  Positive for abdominal pain and diarrhea. Negative for nausea and vomiting.      Physical Exam:  /63   Pulse 100   Temp 99.1 øF (37.3 øC) (Oral)   Resp 18   Ht 175.3 cm (69\")   Wt 88.5 kg (195 lb 1.7 oz)   SpO2 100%   BMI 28.81 kg/mý     Physical Exam  Vitals and nursing note reviewed.   Constitutional:       Appearance: Normal appearance. He is well-developed and normal weight.   HENT:      Head: Normocephalic and atraumatic.      Nose: Nose normal.   Eyes:      Conjunctiva/sclera: Conjunctivae normal.      Pupils: Pupils are equal, round, and reactive to " light.   Cardiovascular:      Rate and Rhythm: Normal rate and regular rhythm.      Heart sounds: Normal heart sounds.   Pulmonary:      Effort: Pulmonary effort is normal.      Breath sounds: Normal breath sounds.   Abdominal:      General: Abdomen is flat. Bowel sounds are normal. There is distension.      Palpations: Abdomen is soft.      Tenderness: There is no abdominal tenderness.      Hernia: Hernia is present in the umbilical area.   Musculoskeletal:         General: Normal range of motion.      Cervical back: Normal range of motion and neck supple.      Right lower le+ Pitting Edema present.      Left lower leg: Edema present.   Skin:     General: Skin is warm and dry.      Coloration: Skin is jaundiced.   Neurological:      General: No focal deficit present.      Mental Status: He is alert and oriented to person, place, and time.   Psychiatric:         Mood and Affect: Mood normal.         Behavior: Behavior normal.         Thought Content: Thought content normal.         Judgment: Judgment normal.              Procedures:  Procedures      Medical Decision Making:    Comorbidities that affect care:    Cirrhosis    External Notes reviewed:    Previous Clinic Note: Gastroenterology office visit from 2023 where patient was seen for follow-up of alcoholic cirrhosis      The following orders were placed and all results were independently analyzed by me:  Orders Placed This Encounter   Procedures    Urine Culture - Urine, Urine, Clean Catch    Blood Culture - Blood,    Blood Culture - Blood,    CT Guided Paracentesis    [place] Draw    Comprehensive Metabolic Panel    Lipase    Urinalysis With Microscopic If Indicated (No Culture) - Urine, Clean Catch    CBC Auto Differential    Scan Slide    Urinalysis, Microscopic Only - Urine, Clean Catch    CBC Auto Differential    Magnesium    Ammonia    BNP    Urinalysis With Culture If Indicated -    Basic Metabolic Panel    Comprehensive Metabolic Panel     Lactic Acid, Plasma    Diet: Cardiac Diets; Healthy Heart (2-3 Na+); Texture: Regular Texture (IDDSI 7); Fluid Consistency: Thin (IDDSI 0)    Undress & Gown    Vital Signs    Notify Provider (With Default Parameters)    Intake & Output    Weigh Patient    Oral Care    Place Sequential Compression Device    Maintain Sequential Compression Device    Activity - Ad Maria De Jesus    Telemetry - Maintain IV Access    Telemetry - Place Orders & Notify Provider of Results When Patient Experiences Acute Chest Pain, Dysrhythmia or Respiratory Distress    Code Status and Medical Interventions:    Inpatient Hospitalist Consult    Snack: Ensure shakes TIDAC    Pulse Oximetry,  Spot    Insert Peripheral IV    Insert Peripheral IV    Inpatient Admission    CBC & Differential    Green Top (Gel)    Lavender Top    Gold Top - SST    Light Blue Top       Medications Given in the Emergency Department:  Medications   sodium chloride 0.9 % flush 10 mL (has no administration in time range)   sodium chloride 0.9 % flush 10 mL (has no administration in time range)   sodium chloride 0.9 % flush 10 mL (has no administration in time range)   sodium chloride 0.9 % infusion 40 mL (has no administration in time range)   acetaminophen (TYLENOL) tablet 650 mg (has no administration in time range)   calcium carbonate (TUMS) chewable tablet 500 mg (200 mg elemental) (has no administration in time range)   sennosides-docusate (PERICOLACE) 8.6-50 MG per tablet 2 tablet (has no administration in time range)     And   polyethylene glycol (MIRALAX) packet 17 g (has no administration in time range)     And   bisacodyl (DULCOLAX) EC tablet 5 mg (has no administration in time range)     And   bisacodyl (DULCOLAX) suppository 10 mg (has no administration in time range)   ondansetron (ZOFRAN) injection 4 mg (has no administration in time range)   melatonin tablet 5 mg (has no administration in time range)   Enoxaparin Sodium (LOVENOX) syringe 40 mg (has no  administration in time range)   sodium chloride 0.9 % infusion (has no administration in time range)   folic acid (FOLVITE) tablet 1 mg (has no administration in time range)   lactulose (CHRONULAC) 10 GM/15ML solution 10 g (has no administration in time range)   nicotine (NICODERM CQ) 21 MG/24HR patch 1 patch (has no administration in time range)   pantoprazole (PROTONIX) EC tablet 40 mg (has no administration in time range)   thiamine (VITAMIN B-1) tablet 100 mg (has no administration in time range)   predniSONE (DELTASONE) tablet 40 mg (has no administration in time range)   cefTRIAXone (ROCEPHIN) 2000 mg/100 mL 0.9% NS IVPB (MBP) (has no administration in time range)        ED Course:    ED Course as of 08/07/23 1521   Mon Aug 07, 2023   1439 I discussed this patient with Dr. Noguera because Dr. Schwarz is not available.  Patient has a sodium of 115 but patient has abdominal distention and bilateral lower extremity edema.  Dr. Noguera stated to withhold any fluids at this time and admit patient [MD]   1454 Discussed patient with Dr. Danielle who will come evaluate patient [MD]      ED Course User Index  [MD] Alex Werner PA-C       Labs:    Lab Results (last 24 hours)       Procedure Component Value Units Date/Time    CBC & Differential [[id number]]  (Abnormal) Collected: 08/07/23 1337    Specimen: Blood from Arm, Right Updated: 08/07/23 1416    Narrative:      The following orders were created for panel order CBC & Differential.  Procedure                               Abnormality         Status                     ---------                               -----------         ------                     CBC Auto Differential[[id number]]        Abnormal            Final result               Scan Slide[[id number]]                                       Final result                 Please view results for these tests on the individual orders.    Comprehensive Metabolic Panel [[id number]]  (Abnormal) Collected: 08/07/23  1337    Specimen: Blood from Arm, Right Updated: 08/07/23 1413     Glucose 154 mg/dL      BUN 16 mg/dL      Creatinine 0.62 mg/dL      Sodium 115 mmol/L      Potassium 3.8 mmol/L      Comment: Slight hemolysis detected by analyzer. Results may be affected.        Chloride 81 mmol/L      CO2 20.5 mmol/L      Calcium 8.8 mg/dL      Total Protein 6.4 g/dL      Albumin 3.2 g/dL      ALT (SGPT) 53 U/L      AST (SGOT) 43 U/L      Alkaline Phosphatase 129 U/L      Total Bilirubin 23.4 mg/dL      Globulin 3.2 gm/dL      A/G Ratio 1.0 g/dL      BUN/Creatinine Ratio 25.8     Anion Gap 13.5 mmol/L      eGFR 124.7 mL/min/1.73     Narrative:      GFR Normal >60  Chronic Kidney Disease <60  Kidney Failure <15      Lipase [[id number]]  (Abnormal) Collected: 08/07/23 1337    Specimen: Blood from Arm, Right Updated: 08/07/23 1404     Lipase 114 U/L     CBC Auto Differential [[id number]]  (Abnormal) Collected: 08/07/23 1337    Specimen: Blood from Arm, Right Updated: 08/07/23 1416     WBC 17.52 10*3/mm3      RBC 3.22 10*6/mm3      Hemoglobin 11.4 g/dL      Hematocrit 32.8 %      .9 fL      MCH 35.4 pg      MCHC 34.8 g/dL      RDW 15.1 %      RDW-SD 57.5 fl      MPV 10.5 fL      Platelets 66 10*3/mm3      Neutrophil % 91.7 %      Lymphocyte % 1.0 %      Monocyte % 5.5 %      Eosinophil % 0.4 %      Basophil % 0.1 %      Immature Grans % 1.3 %      Neutrophils, Absolute 16.07 10*3/mm3      Lymphocytes, Absolute 0.18 10*3/mm3      Monocytes, Absolute 0.96 10*3/mm3      Eosinophils, Absolute 0.07 10*3/mm3      Basophils, Absolute 0.02 10*3/mm3      Immature Grans, Absolute 0.22 10*3/mm3      nRBC 0.0 /100 WBC     Scan Slide [[id number]] Collected: 08/07/23 1337    Specimen: Blood from Arm, Right Updated: 08/07/23 1416     Anisocytosis Slight/1+     [place] Cells Mod/2+     Macrocytes Slight/1+     WBC Morphology Normal     Platelet Estimate Decreased    BNP [[id number]] Collected: 08/07/23 1337    Specimen: Blood from Arm, Right Updated:  08/07/23 1515    Urinalysis With Microscopic If Indicated (No Culture) - Urine, Clean Catch [[id number]]  (Abnormal) Collected: 08/07/23 1341    Specimen: Urine, Clean Catch Updated: 08/07/23 1435     Color, UA Dark Yellow     Appearance, UA Clear     pH, UA 5.5     Specific Gravity, UA 1.014     Glucose, UA Negative     Ketones, UA Negative     Bilirubin, UA Large (3+)     Blood, UA Negative     Protein, UA Negative     Leuk Esterase, UA Trace     Nitrite, UA Positive     Urobilinogen, UA 1.0 E.U./dL    Urinalysis, Microscopic Only - Urine, Clean Catch [[id number]]  (Abnormal) Collected: 08/07/23 1341    Specimen: Urine, Clean Catch Updated: 08/07/23 1435     RBC, UA None Seen /HPF      WBC, UA 0-2 /HPF      Bacteria, UA None Seen /HPF      Squamous Epithelial Cells, UA 0-2 /HPF      Hyaline Casts, UA 0-2 /LPF      Methodology Manual Light Microscopy    Urine Culture - Urine, Urine, Clean Catch [[id number]] Collected: 08/07/23 1341    Specimen: Urine, Clean Catch Updated: 08/07/23 1455             Imaging:    No Radiology Exams Resulted Within Past 24 Hours      Differential Diagnosis and Discussion:    Abdominal Pain: Based on the patient's signs and symptoms, I considered abdominal aortic aneurysm, small bowel obstruction, pancreatitis, acute cholecystitis, acute appendecitis, peptic ulcer disease, gastritis, colitis, endocrine disorders, irritable bowel syndrome and other differential diagnosis an etiology of the patient's abdominal pain.    All labs were reviewed and interpreted by me.    MDM  Number of Diagnoses or Management Options  Alcoholic cirrhosis of liver with ascites  Hyponatremia  Diagnosis management comments: Patient presented to the emergency department today for evaluation of abdominal distention and pain.  CBC does note an elevated white blood cell count of 17 which is improved from previous white blood cell count 2 weeks ago.  CMP significant for hyponatremia and elevated liver enzymes.  Patient  has known history of alcoholic cirrhosis.  Lipase is mildly elevated.  Urinalysis is nitrate positive.  I considered giving the patient IV fluids in the emergency department however due to patient having edema I just consulted hospitalist for admission of the hyponatremia.  I discussed the patient with Dr. Danielle who accepts patient for admission       Amount and/or Complexity of Data Reviewed  Clinical lab tests: reviewed and ordered  Tests in the radiology section of [facility]: ordered and reviewed  Decide to obtain previous medical records or to obtain history from someone other than the patient: yes    Risk of Complications, Morbidity, and/or Mortality  Presenting problems: moderate  Diagnostic procedures: moderate  Management options: moderate    Patient Progress  Patient progress: stable     Consultants/Shared Management Plan:    Hospitalist: I have discussed the case with Dr. Danielle who agrees to accept the patient for admission.    Social Determinants of Health:    Patient is independent, reliable, and has access to care.       Disposition and Care Coordination:    Admit:   Through independent evaluation of the patient's history, physical, and imperical data, the patient meets criteria for observation/admission to the hospital.      Final diagnoses:   Hyponatremia   Alcoholic cirrhosis of liver with ascites        ED Disposition       ED Disposition   Decision to Admit    Condition   --    Comment   Level of Care: Telemetry [5]   Diagnosis: Decompensated hepatic cirrhosis [[id number]]   Admitting Physician: NAVIN DANIELLE [[id number]]   Attending Physician: NAVIN DANIELLE [[id number]]   Certification: I Certify That Inpatient Hospital Services Are Medically Necessary For Greater Than 2 Midnights                 This medical record created using voice recognition software.             Alex Werner PA-C  08/07/23 1526

## 2023-08-07 NOTE — PAYOR COMM NOTE
"Colin Dickens (38 y.o. Male)       Date of Birth   1985    Social Security Number       Address   [address] [place] [address]    Home Phone   [phone]    MRN   [id number]       Tenriism   None    Marital Status                               Admission Date   8/7/23    Admission Type   Emergency    Admitting Provider   Rafat Danielle MD    Attending Provider       Department, Room/Bed   [facility] 5TH FLOOR SURGICAL TELEMETRY UNIT, 504/1       Discharge Date       Discharge Disposition       Discharge Destination                                 Attending Provider: (none)   Allergies: No Known Allergies    Isolation: None   Infection: None   Code Status: CPR    Ht: 175.3 cm (69\")   Wt: 88.5 kg (195 lb 1.7 oz)    Admission Cmt: None   Principal Problem: Decompensated hepatic cirrhosis [K72.90,K74.60]                   Active Insurance as of 8/7/2023       Primary Coverage       Payor Plan Insurance Group Employer/Plan Group    [employer]HEM BLUE CROSS ANTHEM BLUE CROSS BLUE SHIELD PPO [id number]       Payor Plan Address Payor Plan Phone Number Payor Plan Fax Number Effective Dates    [address] [phone]  1/17/2021 - None Entered    [place] [address]         Subscriber Name Subscriber Birth Date Member ID       COLIN DICKENS 1985 [id number]                     Emergency Contacts        (Rel.) Home Phone Work Phone Mobile Phone    GABY PAN (Mother) -- -- [phone]           Liver Disease RRG Inpatient Care by Ivory Hager       Indications Met: Reviewed on 8/7/2023 by Ivory Hager       Created Using Review Status Review Entered   [facility] Primary Completed 8/7/2023 1631       Created By   Ivory Hager       Criteria Set Name - Subset   Liver Disease RRG Inpatient Care      Criteria Review   Liver Disease RRG Inpatient Care     Overall Determination: Indications Met     Criteria:  [x] Admission is " indicated for  1 or more  of the following :      [x] Cirrhosis or chronic liver disease (liver disease present for more than 26 weeks) with acute exacerbation, as indicated by  ALL  of the following :          [x] Total serum bilirubin of 5 mg/dL (86 micromoles/L) or more              8/7/2023  4:31 PM                  -- 8/7/2023  4:31 PM by Ivory Hager --                      Total Bilirubin 23.4          [x] Elevation of prothrombin time to INR greater than 1.5 attributable to liver disease (ie, alternative etiology not more likely)              8/7/2023  4:31 PM                  -- 8/7/2023  4:31 PM by Ivory Hager --                      Taken 08/04 Protime 30.9, INR 3.05, PTT 35.4          [x] Acute hepatic decompensation, as indicated by  1 or more  of the following :              [x] Ascites of acute onset (within 4 weeks)                  8/7/2023  4:31 PM                      -- 8/7/2023  4:31 PM by Ivory Hager --                          US Paracentesis 9.4 L of clear ma ascities fluid removed 08/04. Pt states he feels like the following morning, the fluid filled up almost immediately. He is experiencing significant anorexia, barely able to keep food or liquid down.      [  ] Alcoholic hepatitis [I] requiring treatment, as indicated by  ALL  of the following :          [x] Alcohol as most likely etiology of liver disease (eg, history of heavy alcohol use [I])              8/7/2023  4:31 PM                  -- 8/7/2023  4:31 PM by Ivory Hager --                      History of cirrhosis and he had paracentesis completed 08/04/2023. Was recently admitted to the hospital when he relapsed on his alcohol abuse but states he has not had any alcohol since then.     Notes:  -- 8/7/2023  4:31 PM by Ivory Hager --      Assessment/Plan:       Decompensated alcoholic cirrhosis with ascites      Hyperbilirubinemia      Concern for SBP      Acute versus chronic alcoholic hepatitis       Concern for clinical dehydration      Anemia of chronic disease      Metabolic acidosis      Hypokalemia      Thrombocytopenia secondary to alcoholic cirrhosis      Supratherapeutic INR due to underlying hepatic disease      Hypoalbuminemia      Tobacco dependence            Admit to the hospital for workup and management of the above      Consult gastroenterology, appreciate assistance. Patient has been referred to a transplant specialist affiliated with [facility] at the end of August      Start Rocephin 2 g daily due to concern for SBP      Order infectious workup including chest x-ray, KUB, blood cultures, urinalysis with culture      Consult IR for paracentesis      Clinically, patient appears dry. Will be very judicious with fluids but start NS at 75 cc/h      Monitor BMP every 4 hours      Sodium worsening with fluids, will discontinue and start diuretics      Start lactulose once daily, if continues to have more than 3 encounters of diarrhea we will discontinue lactulose altogether      Patient was on prednisone taper for alcoholic hepatitis, continue prednisone 40 mg daily      Start folic acid, thiamine, multivitamin      Ensure shakes 3 times daily AC      Lovenox for DVT prophylaxis      PPI daily      Patient's last drink was over 10 days ago, no indication for CIWA protocol      Ordered nicotine patch      Continue appropriate home medications      Trend renal function and electrolytes with a.m. BMP, magnesium       Trend Hgb and WBC with a.m. CBC            Discussed case with: ED clinician, bedside RN, gastroenterology            DVT prophylaxis:      Medical and mechanical DVT prophylaxis orders are present.               Mohsen: ALANA [id number] Tax ID [id number]  Problem List             Codes Noted - Resolved       Hospital    * (Principal) Decompensated hepatic cirrhosis ICD-10-CM: K72.90, K74.60  ICD-9-CM: 571.5, 572.8 8/7/2023 - Present       Non-Hospital    Liver failure ICD-10-CM:  K72.90  ICD-9-CM: 572.8 7/18/2023 - Present    Alcoholic cirrhosis ICD-10-CM: K70.30  ICD-9-CM: 571.2 5/12/2022 - Present    Current moderate episode of major depressive disorder without prior episode ICD-10-CM: F32.1  ICD-9-CM: 296.22 5/12/2022 - Present    Tobacco dependence due to cigarettes ICD-10-CM: F17.210  ICD-9-CM: 305.1 5/12/2022 - Present    Lipid screening ICD-10-CM: Z13.220  ICD-9-CM: V77.91 5/12/2022 - Present    Alcoholic hepatitis ICD-10-CM: K70.10  ICD-9-CM: 571.1 2/20/2019 - Present    Hyponatremia ICD-10-CM: E87.1  ICD-9-CM: 276.1 2/20/2019 - Present    Mixed anxiety depressive disorder ICD-10-CM: F41.8  ICD-9-CM: 300.4 2/20/2019 - Present    Thrombocytopenia ICD-10-CM: D69.6  ICD-9-CM: 287.5 2/20/2019 - Present    Hyperbilirubinemia ICD-10-CM: E80.6  ICD-9-CM: 782.4 3/13/2017 - Present    Anterior epistaxis ICD-10-CM: R04.0  ICD-9-CM: 784.7 2/22/2017 - Present    Posterior epistaxis ICD-10-CM: R04.0  ICD-9-CM: 784.7 2/22/2017 - Present    Sinusitis, acute maxillary ICD-10-CM: J01.00  ICD-9-CM: 461.0 2/22/2017 - Present    Fracture, metacarpal ICD-10-CM: S62.309A  ICD-9-CM: 815.00 6/27/2015 - Present    Foot pain, left ICD-10-CM: M79.672  ICD-9-CM: 729.5 6/14/2014 - Present    Gout ICD-10-CM: M10.9  ICD-9-CM: 274.9 6/14/2014 - Present    Conjunctivitis ICD-10-CM: H10.9  ICD-9-CM: 372.30 3/5/2013 - Present    Other disorders of lung ICD-10-CM: J98.4  ICD-9-CM: 518.89 2/22/2013 - Present    Muscle strain of chest wall ICD-10-CM: S29.011A  ICD-9-CM: 848.8 2/20/2013 - Present    Shoulder strain ICD-10-CM: S46.919A  ICD-9-CM: 840.9 2/20/2013 - Present    Knee pain ICD-10-CM: M25.569  ICD-9-CM: 719.46 2/11/2013 - Present    Gynecomastia ICD-10-CM: N62  ICD-9-CM: 611.1 9/21/2012 - Present    Reduced libido ICD-10-CM: R68.82  ICD-9-CM: 799.81 8/1/2012 - Present    Encounter for general adult medical examination without abnormal findings ICD-10-CM: Z00.00  ICD-9-CM: V70.9 8/1/2012 - Present        History  & Physical        Rafat Danielle MD at 23 1526           [facility]  Date: [facility]   Patient Name: Colin Rowland  : 1985  MRN: [id number]  Primary Care Physician:  Andrea Sanders PA-C  Date of admission: 2023    Subjective   Subjective     Chief Complaint: Weakness    HPI:    Colin Rowland is a 38 y.o. male with PMH alcoholic cirrhosis who presented to the ED on  due to complaints of weakness, inability to tolerate oral intake.  Patient was recently admitted to this facility and discharged on  for decompensated alcoholic cirrhosis.  He states he had outpatient paracentesis this past Friday, they pulled 9.4 L off.  He felt like the following morning, the fluid filled up almost immediately.  He has been experiencing significant anorexia, barely able to keep any food or liquid down.  He has been on lactulose and having up to 5 watery bowel movements daily.  He has tried to cut back on lactulose.  His Aldactone was recently increased by GI so he is continue to take his diuretics.  This morning, he felt severely weak, shaky, fatigued prompting him to seek medical attention in the ED.  On arrival, he was found to have a sodium of 115, baseline appears to be closer to 127, creatinine 0.62 which is near his baseline, bilirubin 23, bilirubin was 15 on last admission.  He does have abdominal fullness and distention but denies overt abdominal pain.  Denies fevers or chills.    Personal History     Past Medical History:  Past Medical History:   Diagnosis Date    Gout     Liver disease        Past Surgical History:  Past Surgical History:   Procedure Laterality Date    MYRINGOTOMY      bilateral    TONSILLECTOMY         Family History:   Family History   Problem Relation Age of Onset    Diabetes Mother     Hypertension Mother     Hypertension Father     Diabetes Maternal Grandmother     Diabetes Maternal Grandfather     Colon cancer Neg Hx         Social  History:   Social History     Socioeconomic History    Marital status:    Tobacco Use    Smoking status: Every Day     Packs/day: 1.00     Years: 17.00     Pack years: 17.00     Types: Cigarettes     Start date: 2005    Smokeless tobacco: Never   Vaping Use    Vaping Use: Never used   Substance and Sexual Activity    Alcohol use: Not Currently     Comment: stopped approx 1 week ago from 7/19/23    Drug use: No    Sexual activity: Defer        Home Medications:  folic acid, furosemide, lactulose, nicotine, pantoprazole, predniSONE, spironolactone, and thiamine    Allergies:  No Known Allergies    Review of Systems   A 14 point review of systems was obtained and otherwise negative unless stated in the HPI    Objective   Objective     Vitals:   Temp:  [99.1 øF (37.3 øC)] 99.1 øF (37.3 øC)  Heart Rate:  [100] 100  Resp:  [18] 18  BP: (111)/(63) 111/63    Physical Exam    Constitutional: Chronically ill-appearing male, visibly jaundiced, appears fatigued   HENT: NCAT, scleral icterus, mucous membranes dry   Respiratory: Diminished in bilateral bases clear to auscultation bilaterally, nonlabored respirations    Cardiovascular: RRR, no MRG   Gastrointestinal: Positive bowel sounds, abdomen distended, fluid wave appreciated, nontender   Musculoskeletal: No bilateral ankle edema, no clubbing or cyanosis to extremities   Psychiatric: Appropriate affect, cooperative   Neurologic: Oriented x 3, proximal muscle wasting noted, strength symmetric in all extremities, Cranial Nerves grossly intact to confrontation, speech clear   Skin: Jaundiced throughout    Result Review    Result Review:  I have personally reviewed the results from the time of this admission to 8/7/2023 15:26 EDT and agree with these findings:  [x]  Laboratory personally reviewed CMP, CBC, lipase, urinalysis  CBC          7/31/2023    12:06 8/4/2023    12:46 8/7/2023    13:37   CBC   WBC 22.55   17.52    RBC 3.54   3.22    Hemoglobin 12.2   11.4     Hematocrit 41.0   32.8    .8   101.9    MCH 34.5   35.4    MCHC 30.0   34.8    RDW 14.2   15.1    Platelets 99  90  66      CMP          7/22/2023    04:51 7/31/2023    12:06 8/7/2023    13:37   CMP   Glucose 137  138  154    BUN 9  12  16    Creatinine 0.64  0.69  0.62    EGFR 123.5  120.7  124.7    Sodium 127  124  115    Potassium 3.3  3.8  3.8    Chloride 89  87  81    Calcium 8.2  9.0  8.8    Total Protein  6.9     Total Protein 6.8  6.9  6.4    Albumin 2.5  2.9     2.7  3.2    Globulin  4.2     Globulin 4.3  4.0  3.2    Total Bilirubin 15.4  16.9  23.4    Alkaline Phosphatase 160  174  129    AST (SGOT) 90  75  43    ALT (SGPT) 39  72  53    Albumin/Globulin Ratio 0.6  0.7  1.0    BUN/Creatinine Ratio 14.1  17.4  25.8    Anion Gap 6.8  12.5  13.5      []  Microbiology  []  Radiology  [x]  EKG/Telemetry Telemetry personally reviewed  []  Cardiology/Vascular   []  Pathology  []  Old records  []  Other:      Assessment & Plan   Assessment / Plan     Assessment/Plan:   Decompensated alcoholic cirrhosis with ascites  Hyperbilirubinemia  Concern for SBP  Acute versus chronic alcoholic hepatitis  Concern for clinical dehydration  Anemia of chronic disease  Metabolic acidosis  Hypokalemia  Thrombocytopenia secondary to alcoholic cirrhosis  Supratherapeutic INR due to underlying hepatic disease  Hypoalbuminemia  Tobacco dependence    Admit to the hospital for workup and management of the above  Consult gastroenterology, appreciate assistance.  Patient has been referred to a transplant specialist affiliated with [facility] at the end of August  Start Rocephin 2 g daily due to concern for SBP  Order infectious workup including chest x-ray, KUB, blood cultures, urinalysis with culture  Consult IR for paracentesis  Clinically, patient appears dry.  Will be very judicious with fluids but start NS at 75 cc/h  Monitor BMP every 4 hours  Sodium worsening with fluids, will discontinue and start diuretics  Start  lactulose once daily, if continues to have more than 3 episodes of diarrhea we will discontinue lactulose altogether  Patient was on prednisone taper for alcoholic hepatitis, continue prednisone 40 mg daily  Start folic acid, thiamine, multivitamin  Ensure shakes 3 times daily AC  Lovenox for DVT prophylaxis  PPI daily  Patient's last drink was over 10 days ago, no indication for CIWA protocol  Ordered nicotine patch  Continue appropriate home medications  Trend renal function and electrolytes with a.m. BMP, magnesium   Trend Hgb and WBC with a.m. CBC    Discussed case with: ED clinician, bedside RN, gastroenterology    DVT prophylaxis:  Medical and mechanical DVT prophylaxis orders are present.    CODE STATUS:    Level Of Support Discussed With: Patient  Code Status (Patient has no pulse and is not breathing): CPR (Attempt to Resuscitate)  Medical Interventions (Patient has pulse or is breathing): Full Support  Release to patient: Routine Release      Electronically signed by Rafat Sky MD, 08/07/23, 3:26 PM EDT.             Electronically signed by Rafat Danielle MD at 08/07/23 1556          Emergency Department Notes        Sandhya Hernandez RN at 08/07/23 1601          Report to mik Avalos rn    Electronically signed by Sandhya Hernandez RN at 08/07/23 1602       Alex Werner PA-C at 08/07/23 1445          Time: 2:45 PM EDT  Date of encounter:  8/7/2023  Independent Historian/Clinical History and Information was obtained by:   Patient    History is limited by: N/A    Chief Complaint: Abdominal pain      History of Present Illness:  Patient is a 38 y.o. year old male who presents to the emergency department for evaluation of abdominal pain.  Patient states he has a history of cirrhosis and states that he had a paracentesis completed on 8-4-2023.  Patient states since that time he feels like all of his edema has completely returned.  Patient states he was recently admitted to the hospital when he  relapsed on his alcohol abuse but states he has not had any alcohol since then.  Patient denies any nausea or vomiting.  Patient does admit to diarrhea but states this is chronic from his medications.  Patient denies any fever.  Patient denies any chest pain.  Patient does state when he exerts himself he feels like he is having shortness of breath from the abdominal swelling.    HPI    Patient Care Team  Primary Care Provider: Andrea Sanders PA-C    Past Medical History:     No Known Allergies  Past Medical History:   Diagnosis Date    Gout     Liver disease      Past Surgical History:   Procedure Laterality Date    MYRINGOTOMY      bilateral    TONSILLECTOMY       Family History   Problem Relation Age of Onset    Diabetes Mother     Hypertension Mother     Hypertension Father     Diabetes Maternal Grandmother     Diabetes Maternal Grandfather     Colon cancer Neg Hx        Home Medications:  Prior to Admission medications    Medication Sig Start Date End Date Taking? Authorizing Provider   folic acid (FOLVITE) 1 MG tablet Take 1 tablet by mouth Daily for 30 days. 7/22/23 8/21/23  Gonzalo Orozco DO   furosemide (Lasix) 40 MG tablet Take 1 tablet by mouth Daily. 7/31/23   Gina Kathleen APRN   lactulose (CHRONULAC) 10 GM/15ML solution Take 15 mL by mouth 3 (Three) Times a Day for 30 days. 7/22/23 8/21/23  Gonzalo Orozco DO   nicotine (NICODERM CQ) 21 MG/24HR patch Place 1 patch on the skin as directed by provider Daily for 30 days.  Patient not taking: Reported on 7/31/2023 7/22/23 8/21/23  Gonzalo Orozco DO   pantoprazole (PROTONIX) 40 MG EC tablet Take 1 tablet by mouth Every Morning for 30 days. 7/23/23 8/22/23  Gonzalo Orozco DO   predniSONE (DELTASONE) 10 MG tablet Take 4 tablets by mouth Daily for 7 days, THEN 3 tablets Daily for 7 days, THEN 2 tablets Daily for 7 days, THEN 1 tablet Daily for 7 days. 7/31/23 8/28/23  Gina Kathleen APRN   spironolactone (ALDACTONE) 50 MG  "tablet Take 1 tablet by mouth Daily. 23   Gina Kathleen APRN   thiamine (VITAMIN B1) 100 MG tablet Take 1 tablet by mouth Daily for 30 days. 23  Gonzalo Orozco DO        Social History:   Social History     Tobacco Use    Smoking status: Every Day     Packs/day: 1.00     Years: 17.00     Pack years: 17.00     Types: Cigarettes     Start date:     Smokeless tobacco: Never   Vaping Use    Vaping Use: Never used   Substance Use Topics    Alcohol use: Not Currently     Comment: stopped approx 1 week ago from 23    Drug use: No         Review of Systems:  Review of Systems   Constitutional:  Negative for fever.   Respiratory:  Positive for shortness of breath.    Cardiovascular:  Negative for chest pain.   Gastrointestinal:  Positive for abdominal pain and diarrhea. Negative for nausea and vomiting.      Physical Exam:  /63   Pulse 100   Temp 99.1 øF (37.3 øC) (Oral)   Resp 18   Ht 175.3 cm (69\")   Wt 88.5 kg (195 lb 1.7 oz)   SpO2 100%   BMI 28.81 kg/mý     Physical Exam  Vitals and nursing note reviewed.   Constitutional:       Appearance: Normal appearance. He is well-developed and normal weight.   HENT:      Head: Normocephalic and atraumatic.      Nose: Nose normal.   Eyes:      Conjunctiva/sclera: Conjunctivae normal.      Pupils: Pupils are equal, round, and reactive to light.   Cardiovascular:      Rate and Rhythm: Normal rate and regular rhythm.      Heart sounds: Normal heart sounds.   Pulmonary:      Effort: Pulmonary effort is normal.      Breath sounds: Normal breath sounds.   Abdominal:      General: Abdomen is flat. Bowel sounds are normal. There is distension.      Palpations: Abdomen is soft.      Tenderness: There is no abdominal tenderness.      Hernia: Hernia is present in the umbilical area.   Musculoskeletal:         General: Normal range of motion.      Cervical back: Normal range of motion and neck supple.      Right lower le+ Pitting Edema " present.      Left lower leg: Edema present.   Skin:     General: Skin is warm and dry.      Coloration: Skin is jaundiced.   Neurological:      General: No focal deficit present.      Mental Status: He is alert and oriented to person, place, and time.   Psychiatric:         Mood and Affect: Mood normal.         Behavior: Behavior normal.         Thought Content: Thought content normal.         Judgment: Judgment normal.              Procedures:  Procedures      Medical Decision Making:    Comorbidities that affect care:    Cirrhosis    External Notes reviewed:    Previous Clinic Note: Gastroenterology office visit from 7- where patient was seen for follow-up of alcoholic cirrhosis      The following orders were placed and all results were independently analyzed by me:  Orders Placed This Encounter   Procedures    Urine Culture - Urine, Urine, Clean Catch    Blood Culture - Blood,    Blood Culture - Blood,    CT Guided Paracentesis    [place] Draw    Comprehensive Metabolic Panel    Lipase    Urinalysis With Microscopic If Indicated (No Culture) - Urine, Clean Catch    CBC Auto Differential    Scan Slide    Urinalysis, Microscopic Only - Urine, Clean Catch    CBC Auto Differential    Magnesium    Ammonia    BNP    Urinalysis With Culture If Indicated -    Basic Metabolic Panel    Comprehensive Metabolic Panel    Lactic Acid, Plasma    Diet: Cardiac Diets; Healthy Heart (2-3 Na+); Texture: Regular Texture (IDDSI 7); Fluid Consistency: Thin (IDDSI 0)    Undress & Gown    Vital Signs    Notify Provider (With Default Parameters)    Intake & Output    Weigh Patient    Oral Care    Place Sequential Compression Device    Maintain Sequential Compression Device    Activity - Ad Maria De Jesus    Telemetry - Maintain IV Access    Telemetry - Place Orders & Notify Provider of Results When Patient Experiences Acute Chest Pain, Dysrhythmia or Respiratory Distress    Code Status and Medical Interventions:    Inpatient Hospitalist  Consult    Snack: Ensure shakes TIDAC    Pulse Oximetry,  Spot    Insert Peripheral IV    Insert Peripheral IV    Inpatient Admission    CBC & Differential    Green Top (Gel)    Lavender Top    Gold Top - SST    Light Blue Top       Medications Given in the Emergency Department:  Medications   sodium chloride 0.9 % flush 10 mL (has no administration in time range)   sodium chloride 0.9 % flush 10 mL (has no administration in time range)   sodium chloride 0.9 % flush 10 mL (has no administration in time range)   sodium chloride 0.9 % infusion 40 mL (has no administration in time range)   acetaminophen (TYLENOL) tablet 650 mg (has no administration in time range)   calcium carbonate (TUMS) chewable tablet 500 mg (200 mg elemental) (has no administration in time range)   sennosides-docusate (PERICOLACE) 8.6-50 MG per tablet 2 tablet (has no administration in time range)     And   polyethylene glycol (MIRALAX) packet 17 g (has no administration in time range)     And   bisacodyl (DULCOLAX) EC tablet 5 mg (has no administration in time range)     And   bisacodyl (DULCOLAX) suppository 10 mg (has no administration in time range)   ondansetron (ZOFRAN) injection 4 mg (has no administration in time range)   melatonin tablet 5 mg (has no administration in time range)   Enoxaparin Sodium (LOVENOX) syringe 40 mg (has no administration in time range)   sodium chloride 0.9 % infusion (has no administration in time range)   folic acid (FOLVITE) tablet 1 mg (has no administration in time range)   lactulose (CHRONULAC) 10 GM/15ML solution 10 g (has no administration in time range)   nicotine (NICODERM CQ) 21 MG/24HR patch 1 patch (has no administration in time range)   pantoprazole (PROTONIX) EC tablet 40 mg (has no administration in time range)   thiamine (VITAMIN B-1) tablet 100 mg (has no administration in time range)   predniSONE (DELTASONE) tablet 40 mg (has no administration in time range)   cefTRIAXone (ROCEPHIN) 2000 mg/100  mL 0.9% NS IVPB (MBP) (has no administration in time range)        ED Course:    ED Course as of 08/07/23 1521   Mon Aug 07, 2023   1439 I discussed this patient with Dr. Noguera because Dr. Schwarz is not available.  Patient has a sodium of 115 but patient has abdominal distention and bilateral lower extremity edema.  Dr. Noguera stated to withhold any fluids at this time and admit patient [MD]   1454 Discussed patient with Dr. Danielle who will come evaluate patient [MD]      ED Course User Index  [MD] Alex Werner PA-C       Labs:    Lab Results (last 24 hours)       Procedure Component Value Units Date/Time    CBC & Differential [[id number]]  (Abnormal) Collected: 08/07/23 1337    Specimen: Blood from Arm, Right Updated: 08/07/23 1416    Narrative:      The following orders were created for panel order CBC & Differential.  Procedure                               Abnormality         Status                     ---------                               -----------         ------                     CBC Auto Differential[[id number]]        Abnormal            Final result               Scan Slide[[id number]]                                       Final result                 Please view results for these tests on the individual orders.    Comprehensive Metabolic Panel [[id number]]  (Abnormal) Collected: 08/07/23 1337    Specimen: Blood from Arm, Right Updated: 08/07/23 1413     Glucose 154 mg/dL      BUN 16 mg/dL      Creatinine 0.62 mg/dL      Sodium 115 mmol/L      Potassium 3.8 mmol/L      Comment: Slight hemolysis detected by analyzer. Results may be affected.        Chloride 81 mmol/L      CO2 20.5 mmol/L      Calcium 8.8 mg/dL      Total Protein 6.4 g/dL      Albumin 3.2 g/dL      ALT (SGPT) 53 U/L      AST (SGOT) 43 U/L      Alkaline Phosphatase 129 U/L      Total Bilirubin 23.4 mg/dL      Globulin 3.2 gm/dL      A/G Ratio 1.0 g/dL      BUN/Creatinine Ratio 25.8     Anion Gap 13.5 mmol/L      eGFR 124.7  mL/min/1.73     Narrative:      GFR Normal >60  Chronic Kidney Disease <60  Kidney Failure <15      Lipase [[id number]]  (Abnormal) Collected: 08/07/23 1337    Specimen: Blood from Arm, Right Updated: 08/07/23 1404     Lipase 114 U/L     CBC Auto Differential [[id number]]  (Abnormal) Collected: 08/07/23 1337    Specimen: Blood from Arm, Right Updated: 08/07/23 1416     WBC 17.52 10*3/mm3      RBC 3.22 10*6/mm3      Hemoglobin 11.4 g/dL      Hematocrit 32.8 %      .9 fL      MCH 35.4 pg      MCHC 34.8 g/dL      RDW 15.1 %      RDW-SD 57.5 fl      MPV 10.5 fL      Platelets 66 10*3/mm3      Neutrophil % 91.7 %      Lymphocyte % 1.0 %      Monocyte % 5.5 %      Eosinophil % 0.4 %      Basophil % 0.1 %      Immature Grans % 1.3 %      Neutrophils, Absolute 16.07 10*3/mm3      Lymphocytes, Absolute 0.18 10*3/mm3      Monocytes, Absolute 0.96 10*3/mm3      Eosinophils, Absolute 0.07 10*3/mm3      Basophils, Absolute 0.02 10*3/mm3      Immature Grans, Absolute 0.22 10*3/mm3      nRBC 0.0 /100 WBC     Scan Slide [[id number]] Collected: 08/07/23 1337    Specimen: Blood from Arm, Right Updated: 08/07/23 1416     Anisocytosis Slight/1+     Wilmar Cells Mod/2+     Macrocytes Slight/1+     WBC Morphology Normal     Platelet Estimate Decreased    BNP [[id number]] Collected: 08/07/23 1337    Specimen: Blood from Arm, Right Updated: 08/07/23 1515    Urinalysis With Microscopic If Indicated (No Culture) - Urine, Clean Catch [[id number]]  (Abnormal) Collected: 08/07/23 1341    Specimen: Urine, Clean Catch Updated: 08/07/23 1435     Color, UA Dark Yellow     Appearance, UA Clear     pH, UA 5.5     Specific Gravity, UA 1.014     Glucose, UA Negative     Ketones, UA Negative     Bilirubin, UA Large (3+)     Blood, UA Negative     Protein, UA Negative     Leuk Esterase, UA Trace     Nitrite, UA Positive     Urobilinogen, UA 1.0 E.U./dL    Urinalysis, Microscopic Only - Urine, Clean Catch [[id number]]  (Abnormal) Collected: 08/07/23 5280     Specimen: Urine, Clean Catch Updated: 08/07/23 1435     RBC, UA None Seen /HPF      WBC, UA 0-2 /HPF      Bacteria, UA None Seen /HPF      Squamous Epithelial Cells, UA 0-2 /HPF      Hyaline Casts, UA 0-2 /LPF      Methodology Manual Light Microscopy    Urine Culture - Urine, Urine, Clean Catch [[id number]] Collected: 08/07/23 1341    Specimen: Urine, Clean Catch Updated: 08/07/23 1455             Imaging:    No Radiology Exams Resulted Within Past 24 Hours      Differential Diagnosis and Discussion:    Abdominal Pain: Based on the patient's signs and symptoms, I considered abdominal aortic aneurysm, small bowel obstruction, pancreatitis, acute cholecystitis, acute appendecitis, peptic ulcer disease, gastritis, colitis, endocrine disorders, irritable bowel syndrome and other differential diagnosis an etiology of the patient's abdominal pain.    All labs were reviewed and interpreted by me.    MDM  Number of Diagnoses or Management Options  Alcoholic cirrhosis of liver with ascites  Hyponatremia  Diagnosis management comments: Patient presented to the emergency department today for evaluation of abdominal distention and pain.  CBC does note an elevated white blood cell count of 17 which is improved from previous white blood cell count 2 weeks ago.  CMP significant for hyponatremia and elevated liver enzymes.  Patient has known history of alcoholic cirrhosis.  Lipase is mildly elevated.  Urinalysis is nitrate positive.  I considered giving the patient IV fluids in the emergency department however due to patient having edema I just consulted hospitalist for admission of the hyponatremia.  I discussed the patient with Dr. Danielle who accepts patient for admission       Amount and/or Complexity of Data Reviewed  Clinical lab tests: reviewed and ordered  Tests in the radiology section of [facility]: ordered and reviewed  Decide to obtain previous medical records or to obtain history from someone other than the patient:  yes    Risk of Complications, Morbidity, and/or Mortality  Presenting problems: moderate  Diagnostic procedures: moderate  Management options: moderate    Patient Progress  Patient progress: stable     Consultants/Shared Management Plan:    Hospitalist: I have discussed the case with Dr. Danielle who agrees to accept the patient for admission.    Social Determinants of Health:    Patient is independent, reliable, and has access to care.       Disposition and Care Coordination:    Admit:   Through independent evaluation of the patient's history, physical, and imperical data, the patient meets criteria for observation/admission to the hospital.      Final diagnoses:   Hyponatremia   Alcoholic cirrhosis of liver with ascites        ED Disposition       ED Disposition   Decision to Admit    Condition   --    Comment   Level of Care: Telemetry [5]   Diagnosis: Decompensated hepatic cirrhosis [[id number]]   Admitting Physician: RAFAT DANIELLE [[id number]]   Attending Physician: RAFAT DANIELLE [[id number]]   Certification: I Certify That Inpatient Hospital Services Are Medically Necessary For Greater Than 2 Midnights                 This medical record created using voice recognition software.             Alex Werner PA-C  08/07/23 1521      Electronically signed by Alex Werner PA-C at 08/07/23 1521       Vital Signs (last day)       Date/Time Temp Temp src Pulse Resp BP Patient Position SpO2    08/07/23 1636 98 (36.7) -- 109 18 145/67 -- 96    08/07/23 1326 99.1 (37.3) Oral 100 18 111/63 -- 100          Facility-Administered Medications as of 8/7/2023   Medication Dose Route Frequency Provider Last Rate Last Admin    acetaminophen (TYLENOL) tablet 650 mg  650 mg Oral Q4H PRN Rafat Danielle MD        calcium carbonate (TUMS) chewable tablet 500 mg (200 mg elemental)  2 tablet Oral BID PRN Rafat Danielle MD        cefTRIAXone (ROCEPHIN) 2000 mg/100 mL 0.9% NS IVPB (MBP)  2,000 mg Intravenous Q24H  Rafat Danielle MD        Enoxaparin Sodium (LOVENOX) syringe 40 mg  40 mg Subcutaneous Daily Rafat Danielle MD   40 mg at 08/07/23 1653    folic acid (FOLVITE) tablet 1 mg  1 mg Oral Daily Rafat Danielle MD   1 mg at 08/07/23 1652    lactulose (CHRONULAC) 10 GM/15ML solution 10 g  10 g Oral Daily Rafat Danielle MD   10 g at 08/07/23 1652    melatonin tablet 5 mg  5 mg Oral Nightly PRN Rafat Danielle MD        nicotine (NICODERM CQ) 21 MG/24HR patch 1 patch  1 patch Transdermal Q24H Rafat Danielle MD   1 patch at 08/07/23 1653    ondansetron (ZOFRAN) injection 4 mg  4 mg Intravenous Q4H PRN Rafat Danielle MD        [START ON 8/8/2023] pantoprazole (PROTONIX) EC tablet 40 mg  40 mg Oral Q AM Rafat Danielle MD        predniSONE (DELTASONE) tablet 40 mg  40 mg Oral Daily Rafat Danielle MD        sodium chloride 0.9 % flush 10 mL  10 mL Intravenous PRN Kush Schwarz MD        sodium chloride 0.9 % flush 10 mL  10 mL Intravenous Q12H Rafat Danielle MD        sodium chloride 0.9 % flush 10 mL  10 mL Intravenous PRN Rafat Danielle MD        sodium chloride 0.9 % infusion 40 mL  40 mL Intravenous PRN Rafat Danielle MD        sodium chloride 0.9 % infusion  75 mL/hr Intravenous Continuous Rafat Danielle MD 75 mL/hr at 08/07/23 1652 75 mL/hr at 08/07/23 1652    thiamine (VITAMIN B-1) tablet 100 mg  100 mg Oral Daily Rafat Danielle MD         Orders (last 24 hrs)        Start     Ordered    08/08/23 0600  Basic Metabolic Panel  Morning Draw,   Status:  Canceled         08/07/23 1515    08/08/23 0600  CBC Auto Differential  Morning Draw         08/07/23 1515    08/08/23 0600  Magnesium  Morning Draw         08/07/23 1515    08/08/23 0600  Comprehensive Metabolic Panel  Morning Draw         08/07/23 1515    08/08/23 0600  pantoprazole (PROTONIX) EC tablet 40 mg  Every Early Morning         08/07/23 1516    08/07/23 2100  sodium chloride 0.9 % flush 10 mL   Every 12 Hours Scheduled         08/07/23 1515    08/07/23 2100  sennosides-docusate (PERICOLACE) 8.6-50 MG per tablet 2 tablet  2 Times Daily,   Status:  Discontinued        [facility] for [facility] Orders Report.    08/07/23 1515    08/07/23 1800  Oral Care  2 Times Daily       08/07/23 1515    08/07/23 1705  XR Abdomen KUB  1 Time Imaging         08/07/23 1621    08/07/23 1622  Obtain Informed Consent  Once         08/07/23 1621    08/07/23 1622  Body Fluid Cell Count With Differential - Body Fluid, Peritoneum  STAT         08/07/23 1621    08/07/23 1622  Albumin, Fluid - Body Fluid, Peritoneum  STAT         08/07/23 1621    08/07/23 1622  Protein, Body Fluid - Body Fluid, Peritoneum  STAT         08/07/23 1621    08/07/23 1622  Body Fluid Culture - Body Fluid, Peritoneum  STAT         08/07/23 1621    08/07/23 1622  XR Abdomen 2+ VW with Chest 1 VW  1 Time Imaging,   Status:  Canceled         08/07/23 1621    08/07/23 1622  Body fluid cell count - Body Fluid, Peritoneum  PROCEDURE ONCE         08/07/23 1621    08/07/23 1615  cefTRIAXone (ROCEPHIN) 2000 mg/100 mL 0.9% NS IVPB (MBP)  Every 24 Hours Scheduled         08/07/23 1517    08/07/23 1600  Vital Signs  Every 4 Hours       08/07/23 1515    08/07/23 1600  Basic Metabolic Panel  Every 4 Hours       08/07/23 1515    08/07/23 1600  Snack: Ensure shakes TIDAC  3 Times Daily      Comments: Ensure shakes TIDAC    08/07/23 1517    08/07/23 1548  Protime-INR  Once         08/07/23 1547    08/07/23 1545  Enoxaparin Sodium (LOVENOX) syringe 40 mg  Daily         08/07/23 1515    08/07/23 1545  sodium chloride 0.9 % infusion  Continuous         08/07/23 1515    08/07/23 1545  folic acid (FOLVITE) tablet 1 mg  Daily         08/07/23 1516    08/07/23 1545  lactulose (CHRONULAC) 10 GM/15ML solution 10 g  Daily         08/07/23 1516    08/07/23 1545  nicotine (NICODERM CQ) 21 MG/24HR patch 1 patch  Every 24 Hours Scheduled         08/07/23 1516    08/07/23 1545   thiamine (VITAMIN B-1) tablet 100 mg  Daily         08/07/23 1516    08/07/23 1545  predniSONE (DELTASONE) tablet 40 mg  Daily         08/07/23 1516    08/07/23 1533  Inpatient Gastroenterology Consult  Once        Specialty:  Gastroenterology  Provider:  Alvarez Bravo MD    08/07/23 1533    08/07/23 1518  Lactic Acid, Plasma  Once         08/07/23 1517    08/07/23 1516  Urinalysis With Culture If Indicated -  Once,   Status:  Canceled        Comments: Please ensure 'Use Existing Specimen' is selected if this order is for urine culture add-on.  If no button appears, please contact the lab for assistance.      08/07/23 1515    08/07/23 1515  Pulse Oximetry,  Spot  Once         08/07/23 1515    08/07/23 1515  Activity - Ad Maria De Jesus  Until Discontinued         08/07/23 1515    08/07/23 1515  Continuous Cardiac Monitoring  Continuous        Comments: Follow Standing Orders As Outlined in Process Instructions (Open Order Report to View Full Instructions)    08/07/23 1515    08/07/23 1515  Telemetry - Maintain IV Access  Continuous         08/07/23 1515    08/07/23 1515  Telemetry - Place Orders & Notify Provider of Results When Patient Experiences Acute Chest Pain, Dysrhythmia or Respiratory Distress  Until Discontinued         08/07/23 1515    08/07/23 1515  Diet: Cardiac Diets; Healthy Heart (2-3 Na+); Texture: Regular Texture (IDDSI 7); Fluid Consistency: Thin (IDDSI 0)  Diet Effective Now         08/07/23 1515    08/07/23 1515  Ammonia  Once         08/07/23 1515    08/07/23 1515  BNP  Once         08/07/23 1515    08/07/23 1515  Blood Culture - Blood, Arm, Left  Once        See [facility] for full Linked Orders Report.    08/07/23 1515    08/07/23 1515  Blood Culture - Blood, Arm, Right  Once        Comments: From a different site than #1.     See [facility] for full Linked Orders Report.    08/07/23 1515    08/07/23 1514  Inpatient Admission  Once         08/07/23 1515    08/07/23 1514  Notify Provider (With  Default Parameters)  Until Discontinued         08/07/23 1515    08/07/23 1514  Intake & Output  Every Shift       08/07/23 1515    08/07/23 1514  Weigh Patient  Once         08/07/23 1515    08/07/23 1514  Insert Peripheral IV  Once         08/07/23 1515    08/07/23 1514  Saline Lock & Maintain IV Access  Continuous,   Status:  Canceled         08/07/23 1515    08/07/23 1514  Place Sequential Compression Device  Once         08/07/23 1515    08/07/23 1514  Maintain Sequential Compression Device  Continuous         08/07/23 1515    08/07/23 1514  Code Status and Medical Interventions:  Continuous         08/07/23 1515    08/07/23 1513  melatonin tablet 5 mg  Nightly PRN         08/07/23 1515    08/07/23 1513  sodium chloride 0.9 % flush 10 mL  As Needed         08/07/23 1515    08/07/23 1513  sodium chloride 0.9 % infusion 40 mL  As Needed         08/07/23 1515    08/07/23 1513  acetaminophen (TYLENOL) tablet 650 mg  Every 4 Hours PRN         08/07/23 1515    08/07/23 1513  calcium carbonate (TUMS) chewable tablet 500 mg (200 mg elemental)  2 Times Daily PRN         08/07/23 1515    08/07/23 1513  polyethylene glycol (MIRALAX) packet 17 g  Daily PRN,   Status:  Discontinued        See [facility]ers[facility] for full Linked Orders Report.    08/07/23 1515    08/07/23 1513  bisacodyl (DULCOLAX) EC tablet 5 mg  Daily PRN,   Status:  Discontinued        See [facility]ers[facility] for full Linked Orders Report.    08/07/23 1515    08/07/23 1513  bisacodyl (DULCOLAX) suppository 10 mg  Daily PRN,   Status:  Discontinued        See [facility]ers[facility] for full Linked Orders Report.    08/07/23 1515    08/07/23 1513  ondansetron (ZOFRAN) injection 4 mg  Every 4 Hours PRN         08/07/23 1515    08/07/23 1513  CT Guided Paracentesis  1 Time Imaging         08/07/23 1513    08/07/23 1454  Urine Culture - Urine, Urine, Clean Catch  Once         08/07/23 1455    08/07/23 1442  Inpatient Hospitalist Consult  Once        Specialty:  Hospitalist  Provider:   Rafat Danielle MD    08/07/23 1441    08/07/23 1353  Urinalysis, Microscopic Only - Urine, Clean Catch  Once         08/07/23 1352    08/07/23 1344  Scan Slide  Once         08/07/23 1343    08/07/23 1328  NPO Diet NPO Type: Strict NPO  Diet Effective Now,   Status:  Canceled         08/07/23 1328    08/07/23 1328  Undress & Gown  Once         08/07/23 1328    08/07/23 1328  Insert Peripheral IV  Once         08/07/23 1328    08/07/23 1328  [place] Draw  Once         08/07/23 1328    08/07/23 1328  CBC & Differential  Once         08/07/23 1328    08/07/23 1328  Comprehensive Metabolic Panel  Once         08/07/23 1328    08/07/23 1328  Lipase  Once         08/07/23 1328    08/07/23 1328  Urinalysis With Microscopic If Indicated (No Culture) - Urine, Clean Catch  Once         08/07/23 1328    08/07/23 1328  Green Top (Gel)  PROCEDURE ONCE         08/07/23 1328    08/07/23 1328  Lavender Top  PROCEDURE ONCE         08/07/23 1328    08/07/23 1328  Gold Top - SST  PROCEDURE ONCE         08/07/23 1328    08/07/23 1328  Light Blue Top  PROCEDURE ONCE         08/07/23 1328    08/07/23 1328  CBC Auto Differential  PROCEDURE ONCE         08/07/23 1328    08/07/23 1327  sodium chloride 0.9 % flush 10 mL  As Needed         08/07/23 1328                  Consult Notes (last 24 hours)  Notes from 08/06/23 1710 through 08/07/23 1710   No notes of this type exist for this encounter.

## 2023-08-07 NOTE — H&P
[facility]   Patient Name: Colin Rowland  : 1985  MRN: [id number]  Primary Care Physician:  Andrea Sanders PA-C  Date of admission: 2023    Subjective   Subjective     Chief Complaint: Weakness    HPI:    Colin Rowland is a 38 y.o. male with PMH alcoholic cirrhosis who presented to the ED on  due to complaints of weakness, inability to tolerate oral intake.  Patient was recently admitted to this facility and discharged on  for decompensated alcoholic cirrhosis.  He states he had outpatient paracentesis this past Friday, they pulled 9.4 L off.  He felt like the following morning, the fluid filled up almost immediately.  He has been experiencing significant anorexia, barely able to keep any food or liquid down.  He has been on lactulose and having up to 5 watery bowel movements daily.  He has tried to cut back on lactulose.  His Aldactone was recently increased by GI so he is continue to take his diuretics.  This morning, he felt severely weak, shaky, fatigued prompting him to seek medical attention in the ED.  On arrival, he was found to have a sodium of 115, baseline appears to be closer to 127, creatinine 0.62 which is near his baseline, bilirubin 23, bilirubin was 15 on last admission.  He does have abdominal fullness and distention but denies overt abdominal pain.  Denies fevers or chills.    Personal History     Past Medical History:  Past Medical History:   Diagnosis Date    Gout     Liver disease        Past Surgical History:  Past Surgical History:   Procedure Laterality Date    MYRINGOTOMY      bilateral    TONSILLECTOMY         Family History:   Family History   Problem Relation Age of Onset    Diabetes Mother     Hypertension Mother     Hypertension Father     Diabetes Maternal Grandmother     Diabetes Maternal Grandfather     Colon cancer Neg Hx         Social History:   Social History     Socioeconomic History    Marital  status:    Tobacco Use    Smoking status: Every Day     Packs/day: 1.00     Years: 17.00     Pack years: 17.00     Types: Cigarettes     Start date: 2005    Smokeless tobacco: Never   Vaping Use    Vaping Use: Never used   Substance and Sexual Activity    Alcohol use: Not Currently     Comment: stopped approx 1 week ago from 7/19/23    Drug use: No    Sexual activity: Defer        Home Medications:  folic acid, furosemide, lactulose, nicotine, pantoprazole, predniSONE, spironolactone, and thiamine    Allergies:  No Known Allergies    Review of Systems   A 14 point review of systems was obtained and otherwise negative unless stated in the HPI    Objective   Objective     Vitals:   Temp:  [99.1 øF (37.3 øC)] 99.1 øF (37.3 øC)  Heart Rate:  [100] 100  Resp:  [18] 18  BP: (111)/(63) 111/63    Physical Exam    Constitutional: Chronically ill-appearing male, visibly jaundiced, appears fatigued   HENT: NCAT, scleral icterus, mucous membranes dry   Respiratory: Diminished in bilateral bases clear to auscultation bilaterally, nonlabored respirations    Cardiovascular: RRR, no MRG   Gastrointestinal: Positive bowel sounds, abdomen distended, fluid wave appreciated, nontender   Musculoskeletal: No bilateral ankle edema, no clubbing or cyanosis to extremities   Psychiatric: Appropriate affect, cooperative   Neurologic: Oriented x 3, proximal muscle wasting noted, strength symmetric in all extremities, Cranial Nerves grossly intact to confrontation, speech clear   Skin: Jaundiced throughout    Result Review    Result Review:  I have personally reviewed the results from the time of this admission to 8/7/2023 15:26 EDT and agree with these findings:  [x]  Laboratory personally reviewed CMP, CBC, lipase, urinalysis  CBC          7/31/2023    12:06 8/4/2023    12:46 8/7/2023    13:37   CBC   WBC 22.55   17.52    RBC 3.54   3.22    Hemoglobin 12.2   11.4    Hematocrit 41.0   32.8    .8   101.9    MCH 34.5   35.4     MCHC 30.0   34.8    RDW 14.2   15.1    Platelets 99  90  66      CMP          7/22/2023    04:51 7/31/2023    12:06 8/7/2023    13:37   CMP   Glucose 137  138  154    BUN 9  12  16    Creatinine 0.64  0.69  0.62    EGFR 123.5  120.7  124.7    Sodium 127  124  115    Potassium 3.3  3.8  3.8    Chloride 89  87  81    Calcium 8.2  9.0  8.8    Total Protein  6.9     Total Protein 6.8  6.9  6.4    Albumin 2.5  2.9     2.7  3.2    Globulin  4.2     Globulin 4.3  4.0  3.2    Total Bilirubin 15.4  16.9  23.4    Alkaline Phosphatase 160  174  129    AST (SGOT) 90  75  43    ALT (SGPT) 39  72  53    Albumin/Globulin Ratio 0.6  0.7  1.0    BUN/Creatinine Ratio 14.1  17.4  25.8    Anion Gap 6.8  12.5  13.5      []  Microbiology  []  Radiology  [x]  EKG/Telemetry Telemetry personally reviewed  []  Cardiology/Vascular   []  Pathology  []  Old records  []  Other:      Assessment & Plan   Assessment / Plan     Assessment/Plan:   Decompensated alcoholic cirrhosis with ascites  Hyperbilirubinemia  Concern for SBP  Acute versus chronic alcoholic hepatitis  Concern for clinical dehydration  Anemia of chronic disease  Metabolic acidosis  Hypokalemia  Thrombocytopenia secondary to alcoholic cirrhosis  Supratherapeutic INR due to underlying hepatic disease  Hypoalbuminemia  Tobacco dependence    Admit to the hospital for workup and management of the above  Consult gastroenterology, appreciate assistance.  Patient has been referred to a transplant specialist affiliated with [facility] at the end of August  Start Rocephin 2 g daily due to concern for SBP  Order infectious workup including chest x-ray, KUB, blood cultures, urinalysis with culture  Consult IR for paracentesis  Clinically, patient appears dry.  Will be very judicious with fluids but start NS at 75 cc/h  Monitor BMP every 4 hours  Sodium worsening with fluids, will discontinue and start diuretics  Start lactulose once daily, if continues to have more than 3 episodes of  diarrhea we will discontinue lactulose altogether  Patient was on prednisone taper for alcoholic hepatitis, continue prednisone 40 mg daily  Start folic acid, thiamine, multivitamin  Ensure shakes 3 times daily AC  Lovenox for DVT prophylaxis  PPI daily  Patient's last drink was over 10 days ago, no indication for CIWA protocol  Ordered nicotine patch  Continue appropriate home medications  Trend renal function and electrolytes with a.m. BMP, magnesium   Trend Hgb and WBC with a.m. CBC    Discussed case with: ED clinician, bedside RN, gastroenterology    DVT prophylaxis:  Medical and mechanical DVT prophylaxis orders are present.    CODE STATUS:    Level Of Support Discussed With: Patient  Code Status (Patient has no pulse and is not breathing): CPR (Attempt to Resuscitate)  Medical Interventions (Patient has pulse or is breathing): Full Support  Release to patient: Routine Release      Electronically signed by Rafat Sky MD, 08/07/23, 3:26 PM EDT.

## 2023-08-07 NOTE — CONSULTS
[facility], [facility]  Initial Inpatient Consult Note    Referring Provider: Hospitalist    Reason for Consultation: Cirrhosis, alcoholic hepatitis, elevated MELD, ascites    Subjective     History of present illness:    38 y.o. male recently discharged home on July 22 after being admitted with similar symptoms.  He was discharged home on prednisone.  He reports drinking malt liquor up until the middle of July just before his admission.  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  He had paracentesis done this past Friday but started having quick return of his fluid Saturday and ultimately came back to the emergency department overnight.  He complains of shortness of breath due to abdominal distention and inability to eat.  No recent endoscopy.  Calculated MELD score is 31.  He reports he was sober at home and also continued to be taking prednisone at home.  Distended without focal tenderness    Past Medical History:  Past Medical History:   Diagnosis Date    Gout     Liver disease      Past Surgical History:  Past Surgical History:   Procedure Laterality Date    MYRINGOTOMY      bilateral    TONSILLECTOMY        Social History:   Social History     Tobacco Use    Smoking status: Every Day     Packs/day: 1.00     Years: 17.00     Pack years: 17.00     Types: Cigarettes     Start date: 2005    Smokeless tobacco: Never   Substance Use Topics    Alcohol use: Not Currently     Comment: stopped approx 1 week ago from 7/19/23      Family History:  Family History   Problem Relation Age of Onset    Diabetes Mother     Hypertension Mother     Hypertension Father     Diabetes Maternal Grandmother     Diabetes Maternal Grandfather     Colon cancer Neg Hx        Home Meds:  Medications Prior to Admission   Medication Sig Dispense Refill Last Dose    folic acid (FOLVITE) 1 MG tablet Take 1 tablet by mouth Daily for 30 days. 30 tablet 0     furosemide  (Lasix) 40 MG tablet Take 1 tablet by mouth Daily. 30 tablet 5     lactulose (CHRONULAC) 10 GM/15ML solution Take 15 mL by mouth 3 (Three) Times a Day for 30 days. 1350 mL 0     pantoprazole (PROTONIX) 40 MG EC tablet Take 1 tablet by mouth Every Morning for 30 days. 30 tablet 0     predniSONE (DELTASONE) 10 MG tablet Take 4 tablets by mouth Daily for 7 days, THEN 3 tablets Daily for 7 days, THEN 2 tablets Daily for 7 days, THEN 1 tablet Daily for 7 days. 70 tablet 0     spironolactone (ALDACTONE) 50 MG tablet Take 1 tablet by mouth Daily. 90 tablet 1     thiamine (VITAMIN B1) 100 MG tablet Take 1 tablet by mouth Daily for 30 days. 30 tablet 0      Current Meds:   cefTRIAXone, 2,000 mg, Intravenous, Q24H  enoxaparin, 40 mg, Subcutaneous, Daily  folic acid, 1 mg, Oral, Daily  lactulose, 10 g, Oral, Daily  nicotine, 1 patch, Transdermal, Q24H  [START ON 8/8/2023] pantoprazole, 40 mg, Oral, Q AM  predniSONE, 40 mg, Oral, Daily  sodium chloride, 10 mL, Intravenous, Q12H  thiamine, 100 mg, Oral, Daily      Allergies:  No Known Allergies  Review of Systems  Pertinent items are noted in HPI, all other systems reviewed and negative         Vital Signs  Temp:  [98 øF (36.7 øC)-99.1 øF (37.3 øC)] 98 øF (36.7 øC)  Heart Rate:  [100-109] 109  Resp:  [18] 18  BP: (111-145)/(63-67) 145/67  Physical Exam:  General Appearance:    Alert, cooperative, in no acute distress   Head:    Normocephalic, without obvious abnormality, atraumatic   Eyes:          conjunctivae , scleral icterus noted   Throat:   no thrush, oral mucosa moist   Neck:   Supple, no adenopathy   Lungs:     Clear to auscultation bilaterally    Heart:    Regular rhythm and normal rate    Chest Wall:    No abnormalities observed   Abdomen:     Soft, n distended without focal tenderness  normal bowel sounds   Extremities:   no edema, no redness   Skin:   No bruising or rash   Psychiatric:  normal mood and insight     Results Review:  [x]  Laboratory   [x]  Radiology  []   Pathology      I reviewed the patient's new clinical results.    Results from last 7 days   Lab Units 08/07/23  1337 08/04/23  1246   WBC 10*3/mm3 17.52*  --    HEMOGLOBIN g/dL 11.4*  --    HEMATOCRIT % 32.8*  --    PLATELETS 10*3/mm3 66* 90*     Results from last 7 days   Lab Units 08/07/23  1553 08/07/23  1337   SODIUM mmol/L 118* 115*   POTASSIUM mmol/L 3.5 3.8   CHLORIDE mmol/L 81* 81*   CO2 mmol/L 23.3 20.5*   BUN mg/dL 16 16   CREATININE mg/dL 0.66* 0.62*   CALCIUM mg/dL 8.8 8.8   BILIRUBIN mg/dL  --  23.4*   ALK PHOS U/L  --  129*   ALT (SGPT) U/L  --  53*   AST (SGOT) U/L  --  43*   GLUCOSE mg/dL 143* 154*     Results from last 7 days   Lab Units 08/07/23  1553 08/04/23  1246   INR  2.99* 3.05*     Lab Results   Lab Value Date/Time    LIPASE 114 (H) 08/07/2023 1337    LIPASE 86 (H) 07/18/2023 1756    LIPASE 56 (H) 04/17/2019 1000    LIPASE 85 (H) 02/09/2019 1322       Radiology:  CT Guided Paracentesis    (Results Pending)   XR Abdomen KUB    (Results Pending)        Assessment & Plan       Decompensated hepatic cirrhosis    Alcoholic cirrhosis    Hyponatremia    Hyperbilirubinemia       Plan:  Patient with an elevated MELD sodium score of 35.  He is now admitted with increased abdominal distention and shortness of breath.  Leukocytosis noted.  Paracentesis has been ordered and is pending this afternoon.  Rocephin has been started.  He denies acute abdominal pain but rather complains of quick return of ascites after paracentesis last week.  If he has no evidence of infection over the next 24 hours then he likely needs a restart of his prednisone which he reports he was still taking at home.  He ultimately may need to be transferred to [facility] transplant facility if he deteriorates for a more rapid transplant evaluation.  Mostly he needs to remain sober which she is aware but currently this is only been for about 3 weeks.  Hyponatremia to be slowly reversed with gentle hydration we will continue to monitor  both his bilirubin and PT/INR      I discussed the patients findings and my recommendations with patient and nursing staff.    Alvarez Bravo MD

## 2023-08-07 NOTE — PLAN OF CARE
Problem: Adult Inpatient Plan of Care  Goal: Plan of Care Review  Outcome: Ongoing, Progressing  Flowsheets (Taken 8/7/2023 1722)  Outcome Evaluation: Recieved Pt for care approximately 1630. VSS. Pt up ad vivian. No complants of pain thus far. Pt scheduled for paracentisis. No immediate concerns at this time. Jenae Valdez, RN.  Goal: Patient-Specific Goal (Individualized)  Outcome: Ongoing, Progressing  Goal: Absence of Hospital-Acquired Illness or Injury  Outcome: Ongoing, Progressing  Intervention: Identify and Manage Fall Risk  Description: Perform standard risk assessment on admission using a validated tool or comprehensive approach appropriate to the patient; reassess fall risk frequently, with change in status or transfer to another level of care.  Communicate fall injury risk to interprofessional healthcare team.  Determine need for increased observation, equipment and environmental modification, such as low bed, signage and supportive, nonskid footwear.  Adjust safety measures to individual developmental age, stage and identified risk factors.  Reinforce the importance of safety and physical activity with patient and family.  Perform regular intentional rounding to assess need for position change, pain assessment and personal needs, including assistance with toileting.  Recent Flowsheet Documentation  Taken 8/7/2023 1631 by Jenae Valdez, RN  Safety Promotion/Fall Prevention: safety round/check completed  Intervention: Prevent Skin Injury  Description: Perform a screening for skin injury risk, such as pressure or moisture associated skin damage on admission and at regular intervals throughout hospital stay.  Keep all areas of skin (especially folds) clean and dry.  Maintain adequate skin hydration.  Relieve and redistribute pressure and protect bony prominences; implement measures based on patient-specific risk factors.  Match turning and repositioning schedule to clinical condition.  Encourage weight  shift frequently; assist with reposition if unable to complete independently.  Float heels off bed; avoid pressure on the Achilles tendon.  Keep skin free from extended contact with medical devices.  Encourage functional activity and mobility, as early as tolerated.  Use aids (e.g., slide boards, mechanical lift) during transfer.  Recent Flowsheet Documentation  Taken 8/7/2023 1631 by Jenae Valdez, RN  Body Position: position changed independently  Intervention: Prevent and Manage VTE (Venous Thromboembolism) Risk  Description: Assess for VTE (venous thromboembolism) risk.  Encourage and assist with early ambulation.  Initiate and maintain compression or other therapy, as indicated, based on identified risk in accordance with organizational protocol and provider order.  Encourage both active and passive leg exercises while in bed, if unable to ambulate.  Recent Flowsheet Documentation  Taken 8/7/2023 1631 by Jenae Valdez, RN  Activity Management: up ad vivian  Goal: Optimal Comfort and Wellbeing  Outcome: Unable to Meet, Plan Revised  Intervention: Provide Person-Centered Care  Description: Use a family-focused approach to care.  Develop trust and rapport by proactively providing information, encouraging questions, addressing concerns and offering reassurance.  Acknowledge emotional response to hospitalization.  Recognize and utilize personal coping strategies.  Honor spiritual and cultural preferences.  Recent Flowsheet Documentation  Taken 8/7/2023 1631 by Jenae Valdez, RN  Trust Relationship/Rapport:   care explained   choices provided   emotional support provided   empathic listening provided   questions answered   questions encouraged   thoughts/feelings acknowledged   reassurance provided  Goal: Readiness for Transition of Care  Outcome: Ongoing, Progressing  Intervention: Mutually Develop Transition Plan  Description: Identify available resources for support (e.g., family, friends, community).  Identify  and address barriers to ongoing treatment and home management (e.g., environmental, financial).  Provide opportunities to practice self-management skills.  Assess and monitor emotional readiness for transition.  Establish or reconnect linkage with outpatient providers or community-based services.  Recent Flowsheet Documentation  Taken 8/7/2023 1623 by Jenae Valdez, RN  Equipment Currently Used at Home: none  Transportation Anticipated: family or friend will provide  Patient/Family Anticipated Services at Transition: none  Patient/Family Anticipates Transition to:   home with family   home   Goal Outcome Evaluation:              Outcome Evaluation: Recieved Pt for care approximately 1630. VSS. Pt up ad vivian. No complants of pain thus far. Pt scheduled for paracentisis. No immediate concerns at this time. Jenae Valdez, RN.         Problem: Adult Inpatient Plan of Care  Goal: Optimal Comfort and Wellbeing  Outcome: Unable to Meet, Plan Revised

## 2023-08-08 ENCOUNTER — APPOINTMENT (OUTPATIENT)
Dept: INTERVENTIONAL RADIOLOGY/VASCULAR | Facility: HOSPITAL | Age: 38
DRG: 432 | End: 2023-08-08
Payer: COMMERCIAL

## 2023-08-08 LAB
ALBUMIN FLD-MCNC: <0.2 G/DL
ALBUMIN SERPL-MCNC: 2.8 G/DL (ref 3.5–5.2)
ALBUMIN/GLOB SERPL: 1.1 G/DL
ALP SERPL-CCNC: 115 U/L (ref 39–117)
ALT SERPL W P-5'-P-CCNC: 41 U/L (ref 1–41)
ANION GAP SERPL CALCULATED.3IONS-SCNC: 10.3 MMOL/L (ref 5–15)
ANION GAP SERPL CALCULATED.3IONS-SCNC: 12 MMOL/L (ref 5–15)
ANION GAP SERPL CALCULATED.3IONS-SCNC: 13.3 MMOL/L (ref 5–15)
ANION GAP SERPL CALCULATED.3IONS-SCNC: 13.5 MMOL/L (ref 5–15)
APPEARANCE FLD: ABNORMAL
AST SERPL-CCNC: 29 U/L (ref 1–40)
BACTERIA SPEC AEROBE CULT: NO GROWTH
BASOPHILS # BLD AUTO: 0.02 10*3/MM3 (ref 0–0.2)
BASOPHILS NFR BLD AUTO: 0.1 % (ref 0–1.5)
BILIRUB SERPL-MCNC: 19 MG/DL (ref 0–1.2)
BUN SERPL-MCNC: 19 MG/DL (ref 6–20)
BUN SERPL-MCNC: 21 MG/DL (ref 6–20)
BUN SERPL-MCNC: 24 MG/DL (ref 6–20)
BUN SERPL-MCNC: 26 MG/DL (ref 6–20)
BUN/CREAT SERPL: 21.6 (ref 7–25)
BUN/CREAT SERPL: 25.5 (ref 7–25)
BUN/CREAT SERPL: 25.9 (ref 7–25)
BUN/CREAT SERPL: 31.3 (ref 7–25)
CALCIUM SPEC-SCNC: 8.4 MG/DL (ref 8.6–10.5)
CALCIUM SPEC-SCNC: 8.5 MG/DL (ref 8.6–10.5)
CALCIUM SPEC-SCNC: 8.8 MG/DL (ref 8.6–10.5)
CALCIUM SPEC-SCNC: 9 MG/DL (ref 8.6–10.5)
CHLORIDE SERPL-SCNC: 82 MMOL/L (ref 98–107)
CHLORIDE SERPL-SCNC: 82 MMOL/L (ref 98–107)
CHLORIDE SERPL-SCNC: 83 MMOL/L (ref 98–107)
CHLORIDE SERPL-SCNC: 84 MMOL/L (ref 98–107)
CO2 SERPL-SCNC: 20.5 MMOL/L (ref 22–29)
CO2 SERPL-SCNC: 22.7 MMOL/L (ref 22–29)
CO2 SERPL-SCNC: 22.7 MMOL/L (ref 22–29)
CO2 SERPL-SCNC: 24 MMOL/L (ref 22–29)
COLOR FLD: YELLOW
CREAT SERPL-MCNC: 0.81 MG/DL (ref 0.76–1.27)
CREAT SERPL-MCNC: 0.83 MG/DL (ref 0.76–1.27)
CREAT SERPL-MCNC: 0.88 MG/DL (ref 0.76–1.27)
CREAT SERPL-MCNC: 0.94 MG/DL (ref 0.76–1.27)
CYTO UR: NORMAL
DEPRECATED RDW RBC AUTO: 53.9 FL (ref 37–54)
EGFRCR SERPLBLD CKD-EPI 2021: 105.8 ML/MIN/1.73
EGFRCR SERPLBLD CKD-EPI 2021: 112.2 ML/MIN/1.73
EGFRCR SERPLBLD CKD-EPI 2021: 114.2 ML/MIN/1.73
EGFRCR SERPLBLD CKD-EPI 2021: 115 ML/MIN/1.73
EOSINOPHIL # BLD AUTO: 0.02 10*3/MM3 (ref 0–0.4)
EOSINOPHIL NFR BLD AUTO: 0.1 % (ref 0.3–6.2)
ERYTHROCYTE [DISTWIDTH] IN BLOOD BY AUTOMATED COUNT: 15.2 % (ref 12.3–15.4)
GLOBULIN UR ELPH-MCNC: 2.6 GM/DL
GLUCOSE SERPL-MCNC: 126 MG/DL (ref 65–99)
GLUCOSE SERPL-MCNC: 146 MG/DL (ref 65–99)
GLUCOSE SERPL-MCNC: 176 MG/DL (ref 65–99)
GLUCOSE SERPL-MCNC: 183 MG/DL (ref 65–99)
HCT VFR BLD AUTO: 26 % (ref 37.5–51)
HGB BLD-MCNC: 9.6 G/DL (ref 13–17.7)
IMM GRANULOCYTES # BLD AUTO: 0.28 10*3/MM3 (ref 0–0.05)
IMM GRANULOCYTES NFR BLD AUTO: 1.4 % (ref 0–0.5)
LAB AP CASE REPORT: NORMAL
LAB AP CLINICAL INFORMATION: NORMAL
LYMPHOCYTES # BLD AUTO: 0.33 10*3/MM3 (ref 0.7–3.1)
LYMPHOCYTES NFR BLD AUTO: 1.6 % (ref 19.6–45.3)
LYMPHOCYTES NFR FLD MANUAL: 5 %
MACROPHAGE FLUID: 4 %
MAGNESIUM SERPL-MCNC: 1.9 MG/DL (ref 1.6–2.6)
MCH RBC QN AUTO: 35.7 PG (ref 26.6–33)
MCHC RBC AUTO-ENTMCNC: 36.9 G/DL (ref 31.5–35.7)
MCV RBC AUTO: 96.7 FL (ref 79–97)
MONOCYTES # BLD AUTO: 2.81 10*3/MM3 (ref 0.1–0.9)
MONOCYTES NFR BLD AUTO: 13.9 % (ref 5–12)
MONOCYTES NFR FLD: 12 %
NEUTROPHILS NFR BLD AUTO: 16.76 10*3/MM3 (ref 1.7–7)
NEUTROPHILS NFR BLD AUTO: 82.9 % (ref 42.7–76)
NEUTROPHILS NFR FLD MANUAL: 79 %
NRBC BLD AUTO-RTO: 0 /100 WBC (ref 0–0.2)
NUC CELL # FLD: 3269 /MM3
OSMOLALITY SERPL: 248 MOSM/KG (ref 275–300)
PATH REPORT.FINAL DX SPEC: NORMAL
PATH REPORT.GROSS SPEC: NORMAL
PLATELET # BLD AUTO: 77 10*3/MM3 (ref 140–450)
PMV BLD AUTO: 10.1 FL (ref 6–12)
POTASSIUM SERPL-SCNC: 3.5 MMOL/L (ref 3.5–5.2)
POTASSIUM SERPL-SCNC: 3.6 MMOL/L (ref 3.5–5.2)
POTASSIUM SERPL-SCNC: 3.7 MMOL/L (ref 3.5–5.2)
POTASSIUM SERPL-SCNC: 3.7 MMOL/L (ref 3.5–5.2)
PROT FLD-MCNC: <1 G/DL
PROT SERPL-MCNC: 5.4 G/DL (ref 6–8.5)
RBC # BLD AUTO: 2.69 10*6/MM3 (ref 4.14–5.8)
RBC # FLD AUTO: <2000 /MM3
SODIUM SERPL-SCNC: 116 MMOL/L (ref 136–145)
SODIUM SERPL-SCNC: 116 MMOL/L (ref 136–145)
SODIUM SERPL-SCNC: 118 MMOL/L (ref 136–145)
SODIUM SERPL-SCNC: 120 MMOL/L (ref 136–145)
WBC NRBC COR # BLD: 20.22 10*3/MM3 (ref 3.4–10.8)

## 2023-08-08 PROCEDURE — 85025 COMPLETE CBC W/AUTO DIFF WBC: CPT | Performed by: INTERNAL MEDICINE

## 2023-08-08 PROCEDURE — 87075 CULTR BACTERIA EXCEPT BLOOD: CPT | Performed by: FAMILY MEDICINE

## 2023-08-08 PROCEDURE — 25010000002 CEFTRIAXONE PER 250 MG: Performed by: INTERNAL MEDICINE

## 2023-08-08 PROCEDURE — P9047 ALBUMIN (HUMAN), 25%, 50ML: HCPCS | Performed by: INTERNAL MEDICINE

## 2023-08-08 PROCEDURE — 82042 OTHER SOURCE ALBUMIN QUAN EA: CPT | Performed by: INTERNAL MEDICINE

## 2023-08-08 PROCEDURE — 83735 ASSAY OF MAGNESIUM: CPT | Performed by: INTERNAL MEDICINE

## 2023-08-08 PROCEDURE — 76942 ECHO GUIDE FOR BIOPSY: CPT

## 2023-08-08 PROCEDURE — 36415 COLL VENOUS BLD VENIPUNCTURE: CPT | Performed by: INTERNAL MEDICINE

## 2023-08-08 PROCEDURE — 84300 ASSAY OF URINE SODIUM: CPT | Performed by: INTERNAL MEDICINE

## 2023-08-08 PROCEDURE — 25010000002 FUROSEMIDE PER 20 MG

## 2023-08-08 PROCEDURE — C1729 CATH, DRAINAGE: HCPCS

## 2023-08-08 PROCEDURE — 89051 BODY FLUID CELL COUNT: CPT | Performed by: INTERNAL MEDICINE

## 2023-08-08 PROCEDURE — 80053 COMPREHEN METABOLIC PANEL: CPT | Performed by: INTERNAL MEDICINE

## 2023-08-08 PROCEDURE — 87070 CULTURE OTHR SPECIMN AEROBIC: CPT | Performed by: FAMILY MEDICINE

## 2023-08-08 PROCEDURE — 25010000002 ALBUMIN HUMAN 25% PER 50 ML: Performed by: INTERNAL MEDICINE

## 2023-08-08 PROCEDURE — 63710000001 PREDNISONE PER 1 MG: Performed by: INTERNAL MEDICINE

## 2023-08-08 PROCEDURE — 83935 ASSAY OF URINE OSMOLALITY: CPT | Performed by: INTERNAL MEDICINE

## 2023-08-08 PROCEDURE — 87205 SMEAR GRAM STAIN: CPT | Performed by: FAMILY MEDICINE

## 2023-08-08 PROCEDURE — 84157 ASSAY OF PROTEIN OTHER: CPT | Performed by: INTERNAL MEDICINE

## 2023-08-08 RX ORDER — ALBUMIN (HUMAN) 12.5 G/50ML
25 SOLUTION INTRAVENOUS ONCE
Status: COMPLETED | OUTPATIENT
Start: 2023-08-08 | End: 2023-08-08

## 2023-08-08 RX ORDER — FUROSEMIDE 10 MG/ML
20 INJECTION INTRAMUSCULAR; INTRAVENOUS ONCE
Status: COMPLETED | OUTPATIENT
Start: 2023-08-08 | End: 2023-08-08

## 2023-08-08 RX ORDER — SODIUM CHLORIDE 9 MG/ML
125 INJECTION, SOLUTION INTRAVENOUS CONTINUOUS
Status: DISCONTINUED | OUTPATIENT
Start: 2023-08-08 | End: 2023-08-08

## 2023-08-08 RX ORDER — ZINC SULFATE 50(220)MG
220 CAPSULE ORAL DAILY
Status: DISCONTINUED | OUTPATIENT
Start: 2023-08-08 | End: 2023-08-10 | Stop reason: HOSPADM

## 2023-08-08 RX ORDER — LIDOCAINE HYDROCHLORIDE 20 MG/ML
20 INJECTION, SOLUTION INFILTRATION; PERINEURAL ONCE
Status: COMPLETED | OUTPATIENT
Start: 2023-08-08 | End: 2023-08-08

## 2023-08-08 RX ADMIN — Medication 220 MG: at 17:43

## 2023-08-08 RX ADMIN — LACTULOSE 10 G: 20 SOLUTION ORAL at 08:27

## 2023-08-08 RX ADMIN — Medication 10 ML: at 08:28

## 2023-08-08 RX ADMIN — PANTOPRAZOLE SODIUM 40 MG: 40 TABLET, DELAYED RELEASE ORAL at 05:48

## 2023-08-08 RX ADMIN — Medication 100 MG: at 08:27

## 2023-08-08 RX ADMIN — Medication 10 ML: at 23:35

## 2023-08-08 RX ADMIN — ALBUMIN HUMAN 25 G: 0.25 SOLUTION INTRAVENOUS at 13:36

## 2023-08-08 RX ADMIN — ALBUMIN HUMAN 25 G: 0.25 SOLUTION INTRAVENOUS at 12:54

## 2023-08-08 RX ADMIN — SODIUM BICARBONATE 1 MEQ: 84 INJECTION INTRAVENOUS at 11:10

## 2023-08-08 RX ADMIN — SODIUM CHLORIDE 125 ML/HR: 9 INJECTION, SOLUTION INTRAVENOUS at 05:48

## 2023-08-08 RX ADMIN — Medication 1 MG: at 08:27

## 2023-08-08 RX ADMIN — CEFTRIAXONE SODIUM 2000 MG: 2 INJECTION, POWDER, FOR SOLUTION INTRAMUSCULAR; INTRAVENOUS at 08:30

## 2023-08-08 RX ADMIN — FUROSEMIDE 20 MG: 10 INJECTION, SOLUTION INTRAMUSCULAR; INTRAVENOUS at 01:07

## 2023-08-08 RX ADMIN — LIDOCAINE HYDROCHLORIDE 20 ML: 20 INJECTION, SOLUTION INFILTRATION; PERINEURAL at 11:10

## 2023-08-08 RX ADMIN — PREDNISONE 40 MG: 20 TABLET ORAL at 08:27

## 2023-08-08 RX ADMIN — NICOTINE 1 PATCH: 21 PATCH, EXTENDED RELEASE TRANSDERMAL at 08:27

## 2023-08-08 NOTE — NURSING NOTE
Paracentesis this am. VSS. Two units albumin this shift. Tolerated well. Pt up ad vivian. Waiting on urine sample. Na trending up. MD aware of low Na. Family at bedside, involved in care. Jenae Valdez RN.

## 2023-08-08 NOTE — SIGNIFICANT NOTE
08/08/23 1000   Plan   Plan CAYDEN RN met with patient and mother at bedside.  Readmission related to previous.  Reports compliance.  Patient report lives alone and has good support.  Mom lives in [place] but is able to stay with patient if needed.  Patient works fulltime and is able to provide own IADL's.  Facesheet verified.  Possible ONS at discharge for paracentesis if needed.  Will continue to follow for possible needs.

## 2023-08-08 NOTE — PROGRESS NOTES
[facility]   Hospitalist Progress Note  Date: 2023  Patient Name: Colin Rowland  : 1985  MRN: [id number]  Date of admission: 2023      Subjective   Subjective     Chief Complaint: Follow up for generalized weakness    Summary: 38-year-old male with advanced alcoholic cirrhosis presented with increased p.o. intake, generalized weakness and abdominal distention.  Just had paracentesis this past Friday had over 9 L removed. On arrival, he was found to have a sodium of 115, baseline appears to be closer to 127, creatinine 0.62 which is near his baseline, bilirubin 23, bilirubin was 15 on last admission.  Infectious work-up obtained.  Started on Rocephin empirically.  Underwent paracentesis H/H.  GI on board    Interval Followup:   No overnight events.  Blood pressure stable.  No documented fevers.  Feeling fine this morning.  Denies worsening weakness. No fever or chills.  No localized abdominal pain, just feels some pressure from distention.  Denies dysuria or suprapubic pain.  No cough, shortness of breath or respiratory complaints.  Had 1 bowel movement this morning, formed and nonbloody    Review of Systems  Cardiovascular:  No Chest Pain, No Edema  Respiratory:  No Cough, No Dyspnea  Gastrointestinal:  No Nausea, No Vomiting      Objective   Objective     Vitals:   Temp:  [98 øF (36.7 øC)-99.1 øF (37.3 øC)] 98.1 øF (36.7 øC)  Heart Rate:  [100-110] 108  Resp:  [16-18] 16  BP: (111-145)/(50-67) 118/50  Physical Exam    Constitutional: Awake, conversant, NAD   Eyes: Pupils equal and reactive, sclerae icteric, no conjunctival injections   HENT: NCAT, nares patent, MMM   Respiratory: Clear to auscultation bilaterally, nonlabored respirations    Cardiovascular: RRR, no murmurs, no edema   Gastrointestinal: Distended, tense, nontender, +BS, reducible umbilical hernia   Neurologic: Alert, Cranial Nerves grossly intact, speech clear   Skin: Extremities warm, jaundiced, no rashes or wounds    Result  Review    Result Review:  I have personally reviewed the following over the last 24 hours (07:00 to 07:00) and agree with the following findings  [x]  Laboratory  CBC          8/4/2023    12:46 8/7/2023    13:37 8/8/2023    04:03   CBC   WBC  17.52  20.22    RBC  3.22  2.69    Hemoglobin  11.4  9.6    Hematocrit  32.8  26.0    MCV  101.9  96.7    MCH  35.4  35.7    MCHC  34.8  36.9    RDW  15.1  15.2    Platelets 90  66  77      CMP          7/31/2023    12:06 8/7/2023    13:37 8/7/2023    15:53 8/7/2023    20:20 8/7/2023    23:11 8/8/2023    04:03 8/8/2023    07:51   CMP   Glucose 138  154  143  179  164  176  146    BUN 12  16  16  18  19  19  21    Creatinine 0.69  0.62  0.66  0.78  0.83  0.88  0.81    EGFR 120.7  124.7  122.4  116.3  114.2  112.2  115.0    Sodium 124  115  118  118  117  116  116    Potassium 3.8  3.8  3.5  3.6  3.6  3.7  3.7    Chloride 87  81  81  84  83  83  82    Calcium 9.0  8.8  8.8  8.4  8.5  8.4  8.5    Total Protein 6.9          Total Protein 6.9  6.4     5.4     Albumin 2.9     2.7  3.2     2.8     Globulin 4.2          Globulin 4.0  3.2     2.6     Total Bilirubin 16.9  23.4     19.0     Alkaline Phosphatase 174  129     115     AST (SGOT) 75  43     29     ALT (SGPT) 72  53     41     Albumin/Globulin Ratio 0.7  1.0     1.1     BUN/Creatinine Ratio 17.4  25.8  24.2  23.1  22.9  21.6  25.9    Anion Gap 12.5  13.5  13.7  9.6  10.0  10.3  13.5      Ammonia 46  INR 2.99      [x]  Microbiology  Blood cultures pending  Urine culture no growth  [x]  Radiology   [x]  EKG/Telemetry monitor personally reviewed: NSR/sinus tachycardia into 110s  []  Cardiology/Vascular   []  Pathology  []  Old records  [x]  Other:    Intake/Output Summary (Last 24 hours) at 8/8/2023 1033  Last data filed at 8/8/2023 0829  Gross per 24 hour   Intake 610 ml   Output 225 ml   Net 385 ml         Assessment & Plan   Assessment / Plan     Assessment/Plan:  Decompensated alcoholic cirrhosis with  ascites  Hyperbilirubinemia  Concern for SBP  Acute versus chronic alcoholic hepatitis  Concern for clinical dehydration  Anemia of chronic disease  Metabolic acidosis  Hypokalemia  Thrombocytopenia secondary to alcoholic cirrhosis  Supratherapeutic INR due to underlying hepatic disease  Hypoalbuminemia  Tobacco dependence          Paracentesis per IR today, appreciate assistance.  Follow-up asitic fluid studies and culture.  Depending on how much fluid is removed will replace albumin.   Urine and blood cultures no growth thus far.  Continue Rocephin 2 g daily  Both bilirubin and INR improved, will continue to monitor these labs  Gastroenterology on board, appreciate recommendations  Stop IV fluids.  Check serum/urine osmolality and urine sodium. Trend sodium levels  Ammonia normal.  No encephalopathy.  Having 1-2 bowel movements per day.  Continue scheduled lactulose q.daily  Stop steroids pending results of infectious workup  Continue Protonix 40 mg daily  Continue daily folic acid and thiamine supplementation  Continue high intensity nicotine patches  DVT prophylaxis: Lovenox 40 mg daily  CBC, CMP in A.M    Discussed plan with RN and gastroenterology.    DVT prophylaxis:  Medical and mechanical DVT prophylaxis orders are present.    CODE STATUS:   Level Of Support Discussed With: Patient  Code Status (Patient has no pulse and is not breathing): CPR (Attempt to Resuscitate)  Medical Interventions (Patient has pulse or is breathing): Full Support  Release to patient: Routine Release    Electronically signed by Wayne Cui DO, 08/08/23, 10:32 AM EDT.

## 2023-08-08 NOTE — PLAN OF CARE
Goal Outcome Evaluation:  Plan of Care Reviewed With: patient        Progress: no change  Outcome Evaluation: sodium still critical at 116, ivf ns at 125ml/hr for the next 8 hours. recheck labs at 1400.Kristin Swanson RN

## 2023-08-09 VITALS
HEART RATE: 99 BPM | RESPIRATION RATE: 18 BRPM | DIASTOLIC BLOOD PRESSURE: 54 MMHG | SYSTOLIC BLOOD PRESSURE: 122 MMHG | WEIGHT: 195.11 LBS | HEIGHT: 69 IN | BODY MASS INDEX: 28.9 KG/M2 | TEMPERATURE: 97.8 F | OXYGEN SATURATION: 98 %

## 2023-08-09 LAB
ALBUMIN SERPL-MCNC: 3.2 G/DL (ref 3.5–5.2)
ALBUMIN/GLOB SERPL: 1.4 G/DL
ALP SERPL-CCNC: 120 U/L (ref 39–117)
ALT SERPL W P-5'-P-CCNC: 37 U/L (ref 1–41)
ANION GAP SERPL CALCULATED.3IONS-SCNC: 9.5 MMOL/L (ref 5–15)
AST SERPL-CCNC: 34 U/L (ref 1–40)
BILIRUB SERPL-MCNC: 15.4 MG/DL (ref 0–1.2)
BUN SERPL-MCNC: 27 MG/DL (ref 6–20)
BUN/CREAT SERPL: 34.2 (ref 7–25)
CALCIUM SPEC-SCNC: 8.9 MG/DL (ref 8.6–10.5)
CHLORIDE SERPL-SCNC: 82 MMOL/L (ref 98–107)
CO2 SERPL-SCNC: 24.5 MMOL/L (ref 22–29)
CREAT SERPL-MCNC: 0.79 MG/DL (ref 0.76–1.27)
DEPRECATED RDW RBC AUTO: 54.8 FL (ref 37–54)
EGFRCR SERPLBLD CKD-EPI 2021: 115.9 ML/MIN/1.73
ERYTHROCYTE [DISTWIDTH] IN BLOOD BY AUTOMATED COUNT: 15.1 % (ref 12.3–15.4)
GLOBULIN UR ELPH-MCNC: 2.3 GM/DL
GLUCOSE SERPL-MCNC: 123 MG/DL (ref 65–99)
HCT VFR BLD AUTO: 23.4 % (ref 37.5–51)
HGB BLD-MCNC: 8.5 G/DL (ref 13–17.7)
INR PPP: 3.2 (ref 0.86–1.15)
MCH RBC QN AUTO: 35.3 PG (ref 26.6–33)
MCHC RBC AUTO-ENTMCNC: 36.3 G/DL (ref 31.5–35.7)
MCV RBC AUTO: 97.1 FL (ref 79–97)
OSMOLALITY UR: 615 MOSM/KG
PLATELET # BLD AUTO: 60 10*3/MM3 (ref 140–450)
PMV BLD AUTO: 10.6 FL (ref 6–12)
POTASSIUM SERPL-SCNC: 3.5 MMOL/L (ref 3.5–5.2)
PROT SERPL-MCNC: 5.5 G/DL (ref 6–8.5)
PROTHROMBIN TIME: 32.1 SECONDS (ref 11.8–14.9)
RBC # BLD AUTO: 2.41 10*6/MM3 (ref 4.14–5.8)
SODIUM SERPL-SCNC: 116 MMOL/L (ref 136–145)
SODIUM UR-SCNC: <20 MMOL/L
WBC NRBC COR # BLD: 15.51 10*3/MM3 (ref 3.4–10.8)

## 2023-08-09 PROCEDURE — P9047 ALBUMIN (HUMAN), 25%, 50ML: HCPCS | Performed by: INTERNAL MEDICINE

## 2023-08-09 PROCEDURE — 85027 COMPLETE CBC AUTOMATED: CPT | Performed by: INTERNAL MEDICINE

## 2023-08-09 PROCEDURE — 25010000002 ALBUMIN HUMAN 25% PER 50 ML: Performed by: INTERNAL MEDICINE

## 2023-08-09 PROCEDURE — 25010000002 CEFTRIAXONE PER 250 MG: Performed by: INTERNAL MEDICINE

## 2023-08-09 PROCEDURE — 80053 COMPREHEN METABOLIC PANEL: CPT | Performed by: INTERNAL MEDICINE

## 2023-08-09 PROCEDURE — 85610 PROTHROMBIN TIME: CPT | Performed by: INTERNAL MEDICINE

## 2023-08-09 PROCEDURE — 25010000002 ENOXAPARIN PER 10 MG: Performed by: INTERNAL MEDICINE

## 2023-08-09 RX ORDER — ZINC SULFATE 50(220)MG
220 CAPSULE ORAL DAILY
Start: 2023-08-10

## 2023-08-09 RX ORDER — NICOTINE 21 MG/24HR
1 PATCH, TRANSDERMAL 24 HOURS TRANSDERMAL
Qty: 30 PATCH | Refills: 0 | Status: SHIPPED | OUTPATIENT
Start: 2023-08-09 | End: 2023-09-08

## 2023-08-09 RX ORDER — ALBUMIN (HUMAN) 12.5 G/50ML
25 SOLUTION INTRAVENOUS
Status: COMPLETED | OUTPATIENT
Start: 2023-08-09 | End: 2023-08-09

## 2023-08-09 RX ADMIN — ALBUMIN HUMAN 25 G: 0.25 SOLUTION INTRAVENOUS at 13:34

## 2023-08-09 RX ADMIN — LACTULOSE 10 G: 20 SOLUTION ORAL at 08:17

## 2023-08-09 RX ADMIN — ALBUMIN HUMAN 25 G: 0.25 SOLUTION INTRAVENOUS at 14:02

## 2023-08-09 RX ADMIN — Medication 100 MG: at 08:17

## 2023-08-09 RX ADMIN — CEFTRIAXONE SODIUM 2000 MG: 2 INJECTION, POWDER, FOR SOLUTION INTRAMUSCULAR; INTRAVENOUS at 09:16

## 2023-08-09 RX ADMIN — ALBUMIN HUMAN 25 G: 0.25 SOLUTION INTRAVENOUS at 14:43

## 2023-08-09 RX ADMIN — ALBUMIN HUMAN 25 G: 0.25 SOLUTION INTRAVENOUS at 12:46

## 2023-08-09 RX ADMIN — NICOTINE 1 PATCH: 21 PATCH, EXTENDED RELEASE TRANSDERMAL at 08:17

## 2023-08-09 RX ADMIN — ENOXAPARIN SODIUM 40 MG: 100 INJECTION SUBCUTANEOUS at 08:17

## 2023-08-09 RX ADMIN — PANTOPRAZOLE SODIUM 40 MG: 40 TABLET, DELAYED RELEASE ORAL at 05:52

## 2023-08-09 RX ADMIN — Medication 220 MG: at 08:17

## 2023-08-09 RX ADMIN — Medication 1 MG: at 08:17

## 2023-08-09 NOTE — PLAN OF CARE
Albumin, will transfer to [facility] when bed available.    Problem: Adult Inpatient Plan of Care  Goal: Plan of Care Review  Outcome: Ongoing, Progressing  Goal: Patient-Specific Goal (Individualized)  Outcome: Ongoing, Progressing  Goal: Absence of Hospital-Acquired Illness or Injury  Outcome: Ongoing, Progressing  Intervention: Identify and Manage Fall Risk  Recent Flowsheet Documentation  Taken 8/9/2023 0833 by Katherine Silver RN  Safety Promotion/Fall Prevention: safety round/check completed  Intervention: Prevent Skin Injury  Recent Flowsheet Documentation  Taken 8/9/2023 0833 by Katherine Silver RN  Body Position: position changed independently  Intervention: Prevent and Manage VTE (Venous Thromboembolism) Risk  Recent Flowsheet Documentation  Taken 8/9/2023 0833 by Katherine Silver RN  Activity Management: up ad vivian  Goal: Readiness for Transition of Care  Outcome: Ongoing, Progressing     Problem: Pain Acute  Goal: Acceptable Pain Control and Functional Ability  Outcome: Ongoing, Progressing   Goal Outcome Evaluation:

## 2023-08-09 NOTE — PLAN OF CARE
Problem: Adult Inpatient Plan of Care  Goal: Plan of Care Review  8/9/2023 1837 by Katherine Silver RN  Outcome: Adequate for Care Transition  8/9/2023 1353 by Katherine Silver RN  Outcome: Ongoing, Progressing  Goal: Patient-Specific Goal (Individualized)  8/9/2023 1837 by Katherine Silver RN  Outcome: Adequate for Care Transition  8/9/2023 1353 by Katherine Silver RN  Outcome: Ongoing, Progressing  Goal: Absence of Hospital-Acquired Illness or Injury  8/9/2023 1837 by Katherine Silver RN  Outcome: Adequate for Care Transition  8/9/2023 1353 by Katherine Silver RN  Outcome: Ongoing, Progressing  Intervention: Identify and Manage Fall Risk  Recent Flowsheet Documentation  Taken 8/9/2023 1705 by Katherine Silver RN  Safety Promotion/Fall Prevention: safety round/check completed  Taken 8/9/2023 1605 by Katherine Silver RN  Safety Promotion/Fall Prevention: safety round/check completed  Taken 8/9/2023 1458 by Katherine Silver RN  Safety Promotion/Fall Prevention: safety round/check completed  Taken 8/9/2023 1255 by Katherine Silver RN  Safety Promotion/Fall Prevention: safety round/check completed  Taken 8/9/2023 1014 by Katherine Silver RN  Safety Promotion/Fall Prevention: safety round/check completed  Taken 8/9/2023 0833 by Katherine Silver RN  Safety Promotion/Fall Prevention: safety round/check completed  Intervention: Prevent Skin Injury  Recent Flowsheet Documentation  Taken 8/9/2023 0833 by Katherine Silver RN  Body Position: position changed independently  Intervention: Prevent and Manage VTE (Venous Thromboembolism) Risk  Recent Flowsheet Documentation  Taken 8/9/2023 0833 by Katherine Silver RN  Activity Management: up ad vivian  Goal: Readiness for Transition of Care  8/9/2023 1837 by Katherine Silver RN  Outcome: Adequate for Care Transition  8/9/2023 1353 by Katherine Silver RN  Outcome: Ongoing, Progressing     Problem: Pain Acute  Goal: Acceptable Pain Control and Functional  Ability  8/9/2023 1837 by Katherine Silver, RN  Outcome: Adequate for Care Transition  8/9/2023 1353 by Katherine Silver, RN  Outcome: Ongoing, Progressing   Goal Outcome Evaluation:

## 2023-08-09 NOTE — DISCHARGE SUMMARY
[facility]         HOSPITALIST  DISCHARGE SUMMARY    Patient Name: Colin Rowland  : 1985  MRN: [id number]    Date of Admission: 2023  Date of Discharge:  23  Primary Care Physician: Andrea Sanders PA-C    Consults       Date and Time Order Name Status Description    2023  3:33 PM Inpatient Gastroenterology Consult Completed     2023  2:41 PM Inpatient Hospitalist Consult      2023  9:20 PM General MD Inpatient Consult Completed             Active and Resolved Hospital Problems:    Decompensated alcoholic cirrhosis with ascites  Spontaneous bacterial peritonitis  Hyperbilirubinemia  Hyponatremia, hypotonic, hypovolemic  Chronic alcoholic hepatitis on prednisone  Anemia of chronic disease  Chronic thrombocytopenia secondary to cirrhosis  Supratherapeutic INR due to underlying hepatic disease  Hypoalbuminemia  Tobacco dependence   Hypokalemia, resolved  Metabolic acidosis, resolved      Hospital Course     Hospital Course:  Colin Rowland is a 38 y.o. male with alcoholic cirrhosis, history of alcoholic hepatitis who quit drinking roughly 3 weeks ago.  He presented to the hospital with generalized weakness, fatigue decreased oral intake and abdominal distention.  On presentation he was tachycardic otherwise vital signs were stable.  He was not encephalopathic and had no abdominal pain.  Ammonia normal.  Elevated white count of 20,000. Sodium of 115, with baseline closer to 127.  Creatinine 0.62 which is near his baseline, bilirubin 23 (bilirubin was 15 on last admission).  INR 2.99.  Hemoglobin 9.6.  Platelets 77,000.  Appeared clinically dry, diuretics held and was started on gentle IV fluids.  Started on empiric Rocephin 2 grams daily.  Of note had been started on prednisone recently for possible alcoholic hepatitis which was held. Gastroenterology consulted.  Underwent paracentesis with 6 L removed.  Fluid studies notable for 3,269 nucleated cells with 79%  neutrophils.  Total protein less than 1.0.  Ascites culture no growth thus far.  Received 1.5g/kg albumin on 8/9.  Blood cultures no growth.  WBC improved to 15,000.    Serum osmolality low with urine osmolality 615 and urine sodium less than 20. Sodium levels fluctuating between 115 and 120.  Creatinine 0.7.  Nonoliguric.  Repeat total bilirubin 15.4.  INR worse at 3.20.  Had downtrending hemoglobin and platelets but no signs of overt bleeding and did not require blood transfusion.  Remained hemodynamically stable.  Gastroenterologist recommended transferring patient to tertiary facility with hepatology service. Discussed the case with gastroenterology Dr. Bass at [facility] who agreed to consult and hospitalist Dr. Lamas who agreed to accept the patient.  He was subsequently transferred via EMS to outside facility in stable condition.    Day of Discharge     Vital Signs:  Temp:  [97.4 øF (36.3 øC)-98.8 øF (37.1 øC)] 98 øF (36.7 øC)  Heart Rate:  [] 98  Resp:  [16-18] 18  BP: (121-146)/(41-54) 129/54  Physical Exam:   GENERAL: The patient is conversant and in no acute distress  HEENT: PERRLA, sclera icteric, oropharynx clear. Moist mucous membranes.   HEART: Regular rate and rhythm. No edema  LUNGS: Equal air entry, clear to auscultation bilaterally.  ABDOMEN: Soft, positive bowel sounds, nontender, nondistended  SKIN: Jaundiced, no rash or open wounds   NEUROLOGIC: Alert, cranial nerves grossly intact, speech clear    Discharge Details         Current Facility-Administered Medications:     acetaminophen (TYLENOL) tablet 650 mg, 650 mg, Oral, Q4H PRN, Rafat Danielle MD    calcium carbonate (TUMS) chewable tablet 500 mg (200 mg elemental), 2 tablet, Oral, BID PRN, Rafat Danielle MD    cefTRIAXone (ROCEPHIN) 2000 mg/100 mL 0.9% NS IVPB (MBP), 2,000 mg, Intravenous, Q24H, Rafat Danielle MD, 2,000 mg at 08/09/23 0916    Enoxaparin Sodium (LOVENOX) syringe 40 mg, 40 mg, Subcutaneous, Daily,  Rafat Danielle MD, 40 mg at 08/09/23 0817    folic acid (FOLVITE) tablet 1 mg, 1 mg, Oral, Daily, Rafat Danielle MD, 1 mg at 08/09/23 0817    lactulose (CHRONULAC) 10 GM/15ML solution 10 g, 10 g, Oral, Daily, Rafat Danielle MD, 10 g at 08/09/23 0817    melatonin tablet 5 mg, 5 mg, Oral, Nightly PRN, Rafat Danielle MD    nicotine (NICODERM CQ) 21 MG/24HR patch 1 patch, 1 patch, Transdermal, Q24H, Rafat Danielle MD, 1 patch at 08/09/23 0817    ondansetron (ZOFRAN) injection 4 mg, 4 mg, Intravenous, Q4H PRN, Rafat Danielle MD    pantoprazole (PROTONIX) EC tablet 40 mg, 40 mg, Oral, Q AM, Rafat Danielle MD, 40 mg at 08/09/23 0552    sodium chloride 0.9 % flush 10 mL, 10 mL, Intravenous, PRN, Kush Schwarz MD    sodium chloride 0.9 % flush 10 mL, 10 mL, Intravenous, Q12H, Rafat Danielle MD, 10 mL at 08/08/23 2335    sodium chloride 0.9 % flush 10 mL, 10 mL, Intravenous, PRN, Rafat Danielle MD    sodium chloride 0.9 % infusion 40 mL, 40 mL, Intravenous, PRN, Rafat Danielle MD    thiamine (VITAMIN B-1) tablet 100 mg, 100 mg, Oral, Daily, Rafat Danielle MD, 100 mg at 08/09/23 0817    zinc sulfate (ZINCATE) capsule 220 mg, 220 mg, Oral, Daily, Alvarez Bravo MD, 220 mg at 08/09/23 0817        HOME MEDICATIONS       Discharge Medications        New Medications        Instructions Start Date   zinc sulfate 220 (50 Zn) MG capsule  Commonly known as: ZINCATE   220 mg, Oral, Daily   Start Date: August 10, 2023            Continue These Medications        Instructions Start Date   folic acid 1 MG tablet  Commonly known as: FOLVITE   1 mg, Oral, Daily      furosemide 40 MG tablet  Commonly known as: Lasix   40 mg, Oral, Daily      lactulose 10 GM/15ML solution  Commonly known as: CHRONULAC   10 g, Oral, 3 Times Daily      nicotine 21 MG/24HR patch  Commonly known as: NICODERM CQ   1 patch, Transdermal, Every 24 Hours Scheduled      pantoprazole 40 MG EC  tablet  Commonly known as: PROTONIX   40 mg, Oral, Every Early Morning      predniSONE 10 MG tablet  Commonly known as: DELTASONE   Take 4 tablets by mouth Daily for 7 days, THEN 3 tablets Daily for 7 days, THEN 2 tablets Daily for 7 days, THEN 1 tablet Daily for 7 days.   Start Date: July 31, 2023     spironolactone 50 MG tablet  Commonly known as: ALDACTONE   50 mg, Oral, Daily      thiamine 100 MG tablet  Commonly known as: VITAMIN B1   100 mg, Oral, Daily               No Known Allergies    Discharge Disposition:  Transfer to Another Facility    Diet:  Hospital:  Diet Order   Procedures    Diet: Cardiac Diets; Healthy Heart (2-3 Na+); Texture: Regular Texture (IDDSI 7); Fluid Consistency: Thin (IDDSI 0)       Discharge Activity:       CODE STATUS:  Code Status and Medical Interventions:   Ordered at: 08/07/23 1515     Level Of Support Discussed With:    Patient     Code Status (Patient has no pulse and is not breathing):    CPR (Attempt to Resuscitate)     Medical Interventions (Patient has pulse or is breathing):    Full Support     Release to patient:    Routine Release         Future Appointments   Date Time Provider Department Center   10/5/2023 10:45 AM Gina Kathleen APRN [facility]           Pertinent  and/or Most Recent Results     PROCEDURES:   PROCEDURE:  US PARACENTESIS     COMPARISON: [facility], XA, US PARACENTESIS, 8/04/2023, 12:37.    IMPRESSION:               Ultrasound-guided paracentesis was performed without complication, patient tolerated   procedure with 6 L of clear, ma ascitic fluid removed. A sample specimen of 125 mL was sent to   the lab for further diagnostic evaluation.  The patient was instructed to obtain follow up care   from the referring physician.     LAB RESULTS:      Lab 08/09/23  0523 08/08/23  0403 08/07/23  1553 08/07/23  1337 08/04/23  1246   WBC 15.51* 20.22*  --  17.52*  --    HEMOGLOBIN 8.5* 9.6*  --  11.4*  --    HEMATOCRIT 23.4* 26.0*   --  32.8*  --    PLATELETS 60* 77*  --  66* 90*   NEUTROS ABS  --  16.76*  --  16.07*  --    IMMATURE GRANS (ABS)  --  0.28*  --  0.22*  --    LYMPHS ABS  --  0.33*  --  0.18*  --    MONOS ABS  --  2.81*  --  0.96*  --    EOS ABS  --  0.02  --  0.07  --    MCV 97.1* 96.7  --  101.9*  --    LACTATE  --   --  1.8  --   --    PROTIME 32.1*  --  30.5*  --  30.9*   APTT  --   --   --   --  35.4*         Lab 08/09/23  0523 08/08/23  1613 08/08/23  1335 08/08/23  0751 08/08/23  0403   SODIUM 116* 120* 118* 116* 116*   POTASSIUM 3.5 3.5 3.6 3.7 3.7   CHLORIDE 82* 84* 82* 82* 83*   CO2 24.5 24.0 22.7 20.5* 22.7   ANION GAP 9.5 12.0 13.3 13.5 10.3   BUN 27* 26* 24* 21* 19   CREATININE 0.79 0.83 0.94 0.81 0.88   EGFR 115.9 114.2 105.8 115.0 112.2   GLUCOSE 123* 126* 183* 146* 176*   CALCIUM 8.9 9.0 8.8 8.5* 8.4*   MAGNESIUM  --   --   --   --  1.9         Lab 08/09/23  0523 08/08/23  0403 08/07/23  1337   TOTAL PROTEIN 5.5* 5.4* 6.4   ALBUMIN 3.2* 2.8* 3.2*   GLOBULIN 2.3 2.6 3.2   ALT (SGPT) 37 41 53*   AST (SGOT) 34 29 43*   BILIRUBIN 15.4* 19.0* 23.4*   ALK PHOS 120* 115 129*   LIPASE  --   --  114*         Lab 08/09/23  0523 08/07/23  1553 08/07/23  1337 08/04/23  1246   PROBNP  --   --  139.3  --    PROTIME 32.1* 30.5*  --  30.9*   INR 3.20* 2.99*  --  3.05*                 Brief Urine Lab Results  (Last result in the past 365 days)        Color   Clarity   Blood   Leuk Est   Nitrite   Protein   CREAT   Urine HCG        08/07/23 1341 Dark Yellow   Clear   Negative   Trace   Positive   Negative                 Microbiology Results (last 10 days)       Procedure Component Value - Date/Time    Body Fluid Culture - Body Fluid, Peritoneum [[id number]] Collected: 08/08/23 1120    Lab Status: Preliminary result Specimen: Body Fluid from Peritoneum Updated: 08/09/23 0749     Body Fluid Culture No growth     Gram Stain Many (4+) WBCs seen      No organisms seen    Blood Culture - Blood, Arm, Left [[id number]]  (Normal) Collected:  08/07/23 1553    Lab Status: Preliminary result Specimen: Blood from Arm, Left Updated: 08/09/23 1600     Blood Culture No growth at 2 days    Blood Culture - Blood, Arm, Right [[id number]]  (Normal) Collected: 08/07/23 1553    Lab Status: Preliminary result Specimen: Blood from Arm, Right Updated: 08/09/23 1600     Blood Culture No growth at 2 days    Urine Culture - Urine, Urine, Clean Catch [[id number]]  (Normal) Collected: 08/07/23 1341    Lab Status: Final result Specimen: Urine, Clean Catch Updated: 08/08/23 2153     Urine Culture No growth    Body Fluid Culture - Body Fluid, Peritoneum [[id number]] Collected: 08/04/23 1350    Lab Status: Final result Specimen: Body Fluid from Peritoneum Updated: 08/07/23 0939     Body Fluid Culture No growth at 3 days     Gram Stain No organisms seen            US Liver    Result Date: 7/20/2023  Impression:   1. Findings compatible with cirrhosis.  No discrete lesion noted given limitations of study 2. Moderate to large amount of ascites 3. Cholelithiasis.  Gallbladder wall thickening is favored to be secondary to underlying liver dysfunction and 3rd spacing.  If there is clinical concern for biliary dysfunction, cholecystitis and further evaluation with a nuclear medicine hepatic biliary scan could always be considered      AYAAN COHN MD       Electronically Signed and Approved By: AYAAN COHN MD on 7/20/2023 at 11:08             XR Chest 1 View    Result Date: 7/18/2023  Impression:  No acute infiltrate is appreciated.    Please note that portions of this note were completed with a voice recognition program.  IMAN STEWART JR, MD       Electronically Signed and Approved By: IMAN STEWART JR, MD on 7/18/2023 at 21:24              US Paracentesis    Result Date: 8/8/2023  Impression:  Ultrasound-guided paracentesis was performed without complication, patient tolerated procedure with 6 L of clear, ma ascitic fluid removed. A sample specimen of 125 mL was sent to  the lab for further diagnostic evaluation.  The patient was instructed to obtain follow up care from the referring physician.    FLAKO LUQUE       Electronically Signed and Approved By: Neville Ramos M.D. on 8/08/2023 at 14:41             US Paracentesis    Result Date: 8/4/2023  Impression:  Ultrasound-guided paracentesis was performed without complication, patient tolerated procedure with 9.4 L of clear, ma ascitic fluid removed. A sample specimen of 125 mL was sent to the lab for further diagnostic evaluation.  The patient was instructed to obtain follow up care from the referring physician.    FLAKO LUQUE       Electronically Signed and Approved By: PAL SALCIDO MD on 8/04/2023 at 15:49             US Paracentesis    Result Date: 7/20/2023  Impression:  Ultrasound-guided paracentesis was performed without complication, patient tolerated procedure with 800 ml of clear, ma ascitic fluid removed. A sample specimen of 125 ml was sent to the lab for further diagnostic evaluation.  The patient was instructed to obtain follow up care from the referring physician.    FLAKO LUQUE       Electronically Signed and Approved By: Neville Ramos M.D. on 7/20/2023 at 16:54             XR Abdomen KUB    Result Date: 8/7/2023  Impression:   1. Nonobstructive bowel gas pattern 2. Cholelithiasis     Neville Ramos M.D.       Electronically Signed and Approved By: Neville Ramos M.D. on 8/07/2023 at 18:27                      Results for orders placed during the hospital encounter of 07/18/23    Adult Transthoracic Echo Complete W/ Cont if Necessary Per Protocol    Interpretation Summary    Left ventricular ejection fraction appears to be 56 - 60%.    The left ventricular cavity is mildly dilated.    Left ventricular diastolic function was normal.    Technically difficult study.    No significant valvular disease.      Labs Pending at Discharge:  Pending Labs       Order Current Status    Blood Culture - Blood, Arm,  Left Preliminary result    Blood Culture - Blood, Arm, Right Preliminary result              Time spent on Discharge including face to face service: >30 minutes    Electronically signed by Wayne Cui DO, 08/09/23, 4:12 PM EDT.

## 2023-08-11 ENCOUNTER — PATIENT ROUNDING (BHMG ONLY) (OUTPATIENT)
Dept: GASTROENTEROLOGY | Facility: CLINIC | Age: 38
End: 2023-08-11
Payer: COMMERCIAL

## 2023-08-11 LAB
BACTERIA FLD CULT: NORMAL
GRAM STN SPEC: NORMAL
GRAM STN SPEC: NORMAL

## 2023-08-11 NOTE — PROGRESS NOTES
8/03/2023      Hello, may I speak with Colin Rowland     My name is Petra Lambert, Clinical Coordinator  . I am calling from [facility]. I show that you had a recent visit with TANIA Finley.    Before we get started may I verify your date of birth? 1985    I am calling to officially welcome you to our practice and ask about your recent visit. Is this a good time to talk? LMOM    Tell me about your visit with us. What things went well?    We strive to ensure that we protect your safety and privacy. Is there anything we could have done to improve this during your visit?        We're always looking for ways to make our patients' experiences even better. Do you have recommendations on ways we may improve?    Overall were you satisfied with your first visit to our practice?    I appreciate you taking the time to speak with me today. Is there anything else I can do for you?    I am glad to hear that you had a very good visit and I appreciate you taking the time to provide feedback on this call. We would greatly appreciate you filling out a survey if you receive one in the mail, email or text. This is a great opportunity to provide any additional feedback that you may think of after this call as well.       Thank you, and have a great day.

## 2023-08-12 LAB
BACTERIA SPEC AEROBE CULT: NORMAL
BACTERIA SPEC AEROBE CULT: NORMAL

## 2023-08-13 LAB — BACTERIA SPEC ANAEROBE CULT: NORMAL

## 2023-08-18 ENCOUNTER — TELEPHONE (OUTPATIENT)
Dept: FAMILY MEDICINE CLINIC | Facility: CLINIC | Age: 38
End: 2023-08-18
Payer: COMMERCIAL

## 2023-08-18 NOTE — TELEPHONE ENCOUNTER
Spoke with patient's mother Angeline and scheduled a hospital follow up appt with Nayan on 8/24/2023 at 2:45pm.

## 2023-08-18 NOTE — TELEPHONE ENCOUNTER
Hub staff attempted to follow warm transfer process and was unsuccessful     Caller: GABY PAN    Relationship to patient: Mother    Best call back number: [phone]     Patient is needing: PATIENTS MOTHER CALLED TO SCHEDULE A HOSPITAL FOLLOW UP FOR THE PATIENT.    PATIENTS MOTHER STATED THAT THE PATIENTS DISCHARGE PAPERS REQUIRE AN APPOINTMENT WITH [facility] WITHIN 2 TO 3 DAYS AFTER DISCHARGE. THE SOONEST APPOINTMENT AVALAIBLE WAS NOT WITHIN THE NEXT WEEK     PLEASE CALL ASAP TO SCHEDULE

## 2023-08-22 NOTE — TELEPHONE ENCOUNTER
Caller:     GABY PAN (Mother) [phone] (Mobile)       Relationship to patient:     Best call back number:     New or established patient?  [] New  [x] Established    Date of discharge:     Facility discharged from: [facility] THEN TRANSFERRED TO [facility]   8-9-23     Diagnosis/Symptoms:     Length of stay (If applicable):     Specialty Only: Did you see a [facility] provider?    [] Yes  [x] No  If so, who?

## 2023-08-24 ENCOUNTER — OFFICE VISIT (OUTPATIENT)
Dept: FAMILY MEDICINE CLINIC | Facility: CLINIC | Age: 38
End: 2023-08-24
Payer: COMMERCIAL

## 2023-08-24 VITALS
OXYGEN SATURATION: 100 % | BODY MASS INDEX: 26.31 KG/M2 | TEMPERATURE: 98.7 F | HEART RATE: 77 BPM | DIASTOLIC BLOOD PRESSURE: 64 MMHG | SYSTOLIC BLOOD PRESSURE: 106 MMHG | HEIGHT: 69 IN | RESPIRATION RATE: 16 BRPM | WEIGHT: 177.6 LBS

## 2023-08-24 DIAGNOSIS — K72.90 DECOMPENSATED HEPATIC CIRRHOSIS: Primary | ICD-10-CM

## 2023-08-24 DIAGNOSIS — K65.9 PERITONITIS: ICD-10-CM

## 2023-08-24 DIAGNOSIS — E87.1 HYPONATREMIA: ICD-10-CM

## 2023-08-24 DIAGNOSIS — K74.60 DECOMPENSATED HEPATIC CIRRHOSIS: Primary | ICD-10-CM

## 2023-08-24 RX ORDER — LANOLIN ALCOHOL/MO/W.PET/CERES
1000 CREAM (GRAM) TOPICAL DAILY
COMMUNITY

## 2023-08-24 RX ORDER — CARVEDILOL 3.12 MG/1
TABLET ORAL
COMMUNITY
Start: 2023-08-19

## 2023-08-24 RX ORDER — CIPROFLOXACIN 500 MG/1
250 TABLET, FILM COATED ORAL DAILY
COMMUNITY
Start: 2023-08-19

## 2023-08-24 RX ORDER — ZINC SULFATE 50(220)MG
220 CAPSULE ORAL DAILY
Qty: 90 CAPSULE | Refills: 0 | Status: SHIPPED | OUTPATIENT
Start: 2023-08-24 | End: 2023-11-22

## 2023-08-24 RX ORDER — SPIRONOLACTONE 100 MG/1
TABLET, FILM COATED ORAL
COMMUNITY
Start: 2023-08-19

## 2023-08-24 RX ORDER — FUROSEMIDE 40 MG/1
40 TABLET ORAL DAILY
Qty: 90 TABLET | Refills: 3 | Status: SHIPPED | OUTPATIENT
Start: 2023-08-24 | End: 2024-08-18

## 2023-08-24 RX ORDER — FOLIC ACID 1 MG/1
1 TABLET ORAL DAILY
COMMUNITY

## 2023-08-24 NOTE — PROGRESS NOTES
Transitional Care Follow Up Visit  Subjective     Colin Rowland is a 38 y.o. male who presents for a transitional care management visit.    Within 48 business hours after discharge our office contacted him via telephone to coordinate his care and needs.      I reviewed and discussed the details of that call along with the discharge summary, hospital problems, inpatient lab results, inpatient diagnostic studies, and consultation reports with Colin.     Current outpatient and discharge medications have been reconciled for the patient.  Reviewed by: Andrea Sanders PA-C          7/22/2023     2:03 PM   Date of TCM Phone Call   [facility]   Date of Admission 7/18/2023   Date of Discharge 7/22/2023   Discharge Disposition Home or Self Care     Risk for Readmission (LACE) No data recorded      History of Present Illness   Course During Hospital Stay: Patient was admitted to [facility] for acute on chronic decompensated cirrhosis secondary to alcohol abuse.  Mayco had been doing really well for quite some time with his drinking but unfortunately relapsed.  He had significant ascites with peritonitis.  He was treated with antibiotics and with paracentesis.  He currently has been sober for over 1 month.  He was evaluated by liver transplant and is in need of a liver transplant.  He will need to remain sober for 6 months according to them prior to being considered for this.  He is currently back on his medications including zinc, vitamin B12, vitamin B1, spironolactone, furosemide, folic acid, and carvedilol.     The following portions of the patient's history were reviewed and updated as appropriate: allergies, current medications, past family history, past medical history, past social history, past surgical history, and problem list.    Review of Systems    Objective   Physical Exam  Constitutional:       Appearance: Normal appearance.   Eyes:      Extraocular Movements: Extraocular movements intact.       Pupils: Pupils are equal, round, and reactive to light.   Cardiovascular:      Rate and Rhythm: Normal rate.      Heart sounds: No murmur heard.  Pulmonary:      Effort: Pulmonary effort is normal.      Breath sounds: No wheezing.   Abdominal:      General: There is no distension (no ascites appreciated. no fluid wave. no jaundice or scleral icterus).   Neurological:      General: No focal deficit present.      Mental Status: He is alert and oriented to person, place, and time.   Psychiatric:         Mood and Affect: Mood normal.         Behavior: Behavior normal.       Assessment & Plan   Diagnoses and all orders for this visit:    1. Decompensated hepatic cirrhosis (Primary)  Comments:  continue current medications and alcohol cessation. MELD shows 50% mortality at this time. f/u w/ transplant.    2. Hyponatremia  Comments:  continue fluid restriction with diuretics. monitor with GI    3. Peritonitis  Comments:  currently on cipro per GI.    Other orders  -     furosemide (Lasix) 40 MG tablet; Take 1 tablet by mouth Daily for 360 days.  Dispense: 90 tablet; Refill: 3  -     Zinc 220 (50 Zn) MG capsule; Take 1 capsule by mouth Daily for 90 days.  Dispense: 90 capsule; Refill: 0  -     Discontinue: thiamine (VITAMIN B-1) 100 MG tablet  tablet; Take 1 tablet by mouth Daily for 360 days.  Dispense: 90 tablet; Refill: 3

## 2023-09-20 ENCOUNTER — TRANSCRIBE ORDERS (OUTPATIENT)
Dept: ADMINISTRATIVE | Facility: HOSPITAL | Age: 38
End: 2023-09-20
Payer: COMMERCIAL

## 2023-09-20 DIAGNOSIS — K74.60 HEPATIC CIRRHOSIS, UNSPECIFIED HEPATIC CIRRHOSIS TYPE, UNSPECIFIED WHETHER ASCITES PRESENT: Primary | ICD-10-CM

## 2023-09-28 NOTE — PRE-PROCEDURE INSTRUCTIONS
"Instructed on date and arrival time of 1030. Come to entrance \"C\".  Must have  over age 18 to drive home.  May have two visitors; however, children under 12 must stay in waiting room.  Discussed diet/NPO.  May take medications as usual except for blood thinners, diabetic medications, or weight loss medications.  Bring list of medications to hospital.  Verbalized understanding of instructions given.  Instructed to call for questions or concerns.  "

## 2023-09-29 ENCOUNTER — APPOINTMENT (OUTPATIENT)
Dept: ULTRASOUND IMAGING | Facility: HOSPITAL | Age: 38
End: 2023-09-29
Payer: COMMERCIAL

## 2023-09-29 ENCOUNTER — HOSPITAL ENCOUNTER (OUTPATIENT)
Facility: HOSPITAL | Age: 38
Setting detail: HOSPITAL OUTPATIENT SURGERY
Discharge: HOME | End: 2023-09-29
Attending: INTERNAL MEDICINE | Admitting: INTERNAL MEDICINE
Payer: COMMERCIAL

## 2023-09-29 ENCOUNTER — ANESTHESIA (OUTPATIENT)
Dept: GASTROENTEROLOGY | Facility: HOSPITAL | Age: 38
End: 2023-09-29
Payer: COMMERCIAL

## 2023-09-29 ENCOUNTER — ANESTHESIA EVENT (OUTPATIENT)
Dept: GASTROENTEROLOGY | Facility: HOSPITAL | Age: 38
End: 2023-09-29
Payer: COMMERCIAL

## 2023-09-29 VITALS
WEIGHT: 171.96 LBS | BODY MASS INDEX: 25.38 KG/M2 | TEMPERATURE: 97.3 F | SYSTOLIC BLOOD PRESSURE: 115 MMHG | RESPIRATION RATE: 12 BRPM | OXYGEN SATURATION: 97 % | DIASTOLIC BLOOD PRESSURE: 41 MMHG | HEART RATE: 84 BPM

## 2023-09-29 PROCEDURE — 25010000002 PROPOFOL 10 MG/ML EMULSION

## 2023-09-29 PROCEDURE — 43235 EGD DIAGNOSTIC BRUSH WASH: CPT | Performed by: INTERNAL MEDICINE

## 2023-09-29 RX ORDER — LIDOCAINE HYDROCHLORIDE 20 MG/ML
INJECTION, SOLUTION EPIDURAL; INFILTRATION; INTRACAUDAL; PERINEURAL AS NEEDED
Status: DISCONTINUED | OUTPATIENT
Start: 2023-09-29 | End: 2023-09-29 | Stop reason: SURG

## 2023-09-29 RX ORDER — SODIUM CHLORIDE, SODIUM LACTATE, POTASSIUM CHLORIDE, CALCIUM CHLORIDE 600; 310; 30; 20 MG/100ML; MG/100ML; MG/100ML; MG/100ML
30 INJECTION, SOLUTION INTRAVENOUS CONTINUOUS
Status: DISCONTINUED | OUTPATIENT
Start: 2023-09-29 | End: 2023-09-29 | Stop reason: HOSPADM

## 2023-09-29 RX ORDER — PROPOFOL 10 MG/ML
VIAL (ML) INTRAVENOUS AS NEEDED
Status: DISCONTINUED | OUTPATIENT
Start: 2023-09-29 | End: 2023-09-29 | Stop reason: SURG

## 2023-09-29 RX ADMIN — PROPOFOL 100 MCG/KG/MIN: 10 INJECTION, EMULSION INTRAVENOUS at 11:32

## 2023-09-29 RX ADMIN — PROPOFOL 100 MG: 10 INJECTION, EMULSION INTRAVENOUS at 11:32

## 2023-09-29 RX ADMIN — LIDOCAINE HYDROCHLORIDE 100 MG: 20 INJECTION, SOLUTION EPIDURAL; INFILTRATION; INTRACAUDAL; PERINEURAL at 11:32

## 2023-09-29 RX ADMIN — SODIUM CHLORIDE, POTASSIUM CHLORIDE, SODIUM LACTATE AND CALCIUM CHLORIDE 30 ML/HR: 600; 310; 30; 20 INJECTION, SOLUTION INTRAVENOUS at 11:11

## 2023-09-29 NOTE — ANESTHESIA PREPROCEDURE EVALUATION
Anesthesia Evaluation     NPO Solid Status: > 8 hours  NPO Liquid Status: > 4 hours           Airway   Mallampati: I  TM distance: >3 FB  Neck ROM: full  No difficulty expected  Dental - normal exam     Pulmonary - normal exam   (+) a smoker Current,  Cardiovascular - normal exam        Neuro/Psych  (+) psychiatric history  GI/Hepatic/Renal/Endo    (+) hepatitis, liver disease cirrhosis    ROS Comment: Alcoholic hepatitis    Musculoskeletal     Abdominal    Substance History      OB/GYN          Other                      Anesthesia Plan    ASA 4     general   total IV anesthesia  intravenous induction     Anesthetic plan, risks, benefits, and alternatives have been provided, discussed and informed consent has been obtained with: patient.  Pre-procedure education provided  Plan discussed with CRNA.    CODE STATUS:

## 2023-09-29 NOTE — ANESTHESIA POSTPROCEDURE EVALUATION
Patient: Colin Rowland    Procedure Summary       Date: 09/29/23 Room / Location: [facility] ENDOSCOPY 4 / [facility] ENDOSCOPY    Anesthesia Start: 1129 Anesthesia Stop: 1147    Procedure: ESOPHAGOGASTRODUODENOSCOPY Diagnosis:       Alcoholic cirrhosis of liver with ascites      (Alcoholic cirrhosis of liver with ascites [K70.31])    Surgeons: Jagjit Childress MD Provider: Kassidy Torres CRNA    Anesthesia Type: general ASA Status: 4            Anesthesia Type: general    Vitals  Vitals Value Taken Time   BP 93/46 09/29/23 1150   Temp 36.3 °C (97.3 °F) 09/29/23 1144   Pulse 80 09/29/23 1154   Resp 23 09/29/23 1144   SpO2 99 % 09/29/23 1154   Vitals shown include unvalidated device data.        Post Anesthesia Care and Evaluation    Post-procedure mental status: acceptable.  Pain management: satisfactory to patient    Airway patency: patent  Anesthetic complications: No anesthetic complications    Cardiovascular status: acceptable  Respiratory status: acceptable    Comments: Per chart review

## 2023-09-29 NOTE — H&P
Pre Procedure History & Physical    Chief Complaint:   Cirrhosis    Subjective     HPI:   Cirrhosis rule out esophageal varices    Past Medical History:   Past Medical History:   Diagnosis Date    Gout     Liver disease        Past Surgical History:  Past Surgical History:   Procedure Laterality Date    MYRINGOTOMY      bilateral    TONSILLECTOMY         Family History:  Family History   Problem Relation Age of Onset    Diabetes Mother     Hypertension Mother     Hypertension Father     Diabetes Maternal Grandmother     Diabetes Maternal Grandfather     Colon cancer Neg Hx        Social History:   reports that he has been smoking cigarettes. He started smoking about 18 years ago. He has a 17.00 pack-year smoking history. He has never used smokeless tobacco. He reports that he does not currently use alcohol. He reports that he does not use drugs.    Medications:   Medications Prior to Admission   Medication Sig Dispense Refill Last Dose    carvedilol (COREG) 3.125 MG tablet        ciprofloxacin (CIPRO) 500 MG tablet Take 0.5 tablets by mouth Daily.       folic acid (FOLVITE) 1 MG tablet Take 1 tablet by mouth Daily.       furosemide (Lasix) 40 MG tablet Take 1 tablet by mouth Daily for 360 days. 90 tablet 3     spironolactone (ALDACTONE) 100 MG tablet        spironolactone (ALDACTONE) 50 MG tablet Take 1 tablet by mouth Daily. (Patient taking differently: Take 2 tablets by mouth Daily.) 90 tablet 1     thiamine (VITAMIN B-1) 100 MG tablet  tablet Take 1 tablet by mouth Daily for 360 days. 90 tablet 3     vitamin B-12 (CYANOCOBALAMIN) 1000 MCG tablet Take 1 tablet by mouth Daily.       Zinc 220 (50 Zn) MG capsule Take 1 capsule by mouth Daily for 90 days. 90 capsule 0     zinc sulfate (ZINCATE) 220 (50 Zn) MG capsule Take 1 capsule by mouth Daily.          Allergies:  Patient has no known allergies.        Objective     Weight 78 kg (171 lb 15.3 oz).    Physical Exam   Constitutional: Pt is oriented to person, place,  and time and well-developed, well-nourished, and in no distress.   Mouth/Throat: Oropharynx is clear and moist.   Neck: Normal range of motion.   Cardiovascular: Normal rate, regular rhythm and normal heart sounds.    Pulmonary/Chest: Effort normal and breath sounds normal.   Abdominal: Soft. Nontender  Skin: Skin is warm and dry.   Psychiatric: Mood, memory, affect and judgment normal.     Assessment & Plan     Diagnosis:  Surveillance for esophageal varices    Anticipated Surgical Procedure:  EGD    The risks, benefits, and alternatives of this procedure have been discussed with the patient or the responsible party- the patient understands and agrees to proceed.

## 2023-10-05 ENCOUNTER — TELEPHONE (OUTPATIENT)
Dept: GASTROENTEROLOGY | Facility: CLINIC | Age: 38
End: 2023-10-05

## 2023-10-05 NOTE — TELEPHONE ENCOUNTER
EGD 9/29/2023: Small hiatal hernia, grade B esophagitis, grade 2 esophageal varices, more moderate portal hypertensive gastropathy, normal duodenum  Patient was put on nadolol 20 mg/day-Coreg was previously on list, patient should not be taking both, please confirm if taking coreg or not.     Please fax to [facility]

## 2023-10-05 NOTE — TELEPHONE ENCOUNTER
Attempted to contact patient, left voicemail message to return call.     9/13/23 Progress Note is from [facility].  Faxing EGD procedural note to his office fax as well as to Dr. Hermila Sosa office fax.

## 2023-10-06 NOTE — TELEPHONE ENCOUNTER
Was able to reach patient's mother (WILEY on file), Ms. Sosa.  Informed of TANIA Finley result note and recommendations.     Ms. Sosa states patient has been taking both BP medications.  Advised to take the nadolol only, discontinue the carvedilol.  Ms. Sosa verbalized understanding.    Advised her that I have faxed the procedural note over to [facility] and Hermila Sosa MD.  She asked that I also fax it over to Dr. Corey Hansen, who is his transplant doctor.  Faxed and confirmed successful transmission.  Advised Ms. Sosa to have their office reach out to me if they do not receive it as she was planning to call them today.    Ms. Sosa states she is having difficulty getting an US scheduled, that when [facility] calls the patient to schedule they are unable to reach him, he works night shift, and would not schedule through her.  Advised I will look into this and follow up with her.  She states the patient needs this scheduled on a Friday.

## 2023-10-06 NOTE — TELEPHONE ENCOUNTER
Spoke with Ms. Sosa:  US Abdomen is scheduled for Friday, October 13 with arrival time of 1345, US will be at 1600.  Educated on prep instructions:  NPO for 8 hours prior to test.  Medications are OK with only a sip of water.    Ms. Sosa verbalized understanding.  Provided my direct contact information and advised her to reach out if there was anything further I could help her with.

## (undated) DEVICE — LINER SURG CANSTR SXN S/RIGD 1500CC

## (undated) DEVICE — Device

## (undated) DEVICE — SOLIDIFIER LIQLOC PLS 1500CC BT

## (undated) DEVICE — SOL IRRG H2O PL/BG 1000ML STRL

## (undated) DEVICE — CONN JET HYDRA H20 AUXILIARY DISP

## (undated) DEVICE — Device: Brand: DEFENDO AIR/WATER/SUCTION AND BIOPSY VALVE

## (undated) DEVICE — BLCK/BITE BLOX WO/DENTL/RIM W/STRAP 54F